# Patient Record
Sex: FEMALE | Race: WHITE | HISPANIC OR LATINO | Employment: UNEMPLOYED | URBAN - METROPOLITAN AREA
[De-identification: names, ages, dates, MRNs, and addresses within clinical notes are randomized per-mention and may not be internally consistent; named-entity substitution may affect disease eponyms.]

---

## 2019-06-11 ENCOUNTER — HOSPITAL ENCOUNTER (EMERGENCY)
Facility: HOSPITAL | Age: 24
Discharge: HOME | End: 2019-06-11
Attending: EMERGENCY MEDICINE

## 2019-06-11 VITALS
WEIGHT: 145 LBS | TEMPERATURE: 98 F | RESPIRATION RATE: 18 BRPM | SYSTOLIC BLOOD PRESSURE: 114 MMHG | OXYGEN SATURATION: 100 % | BODY MASS INDEX: 25.69 KG/M2 | HEIGHT: 63 IN | DIASTOLIC BLOOD PRESSURE: 98 MMHG | HEART RATE: 71 BPM

## 2019-06-11 DIAGNOSIS — N30.00 ACUTE CYSTITIS WITHOUT HEMATURIA: ICD-10-CM

## 2019-06-11 DIAGNOSIS — R10.2 SUPRAPUBIC ABDOMINAL PAIN: Primary | ICD-10-CM

## 2019-06-11 LAB
ALBUMIN SERPL BCP-MCNC: 4 G/DL (ref 3.5–5.2)
ALP SERPL-CCNC: 56 U/L (ref 55–135)
ALT SERPL W/O P-5'-P-CCNC: 15 U/L (ref 10–44)
ANION GAP SERPL CALC-SCNC: 10 MMOL/L (ref 8–16)
AST SERPL-CCNC: 15 U/L (ref 10–40)
B-HCG UR QL: NEGATIVE
BILIRUB SERPL-MCNC: 0.3 MG/DL (ref 0.1–1)
BILIRUB UR QL STRIP: NEGATIVE
BUN SERPL-MCNC: 8 MG/DL (ref 6–20)
CALCIUM SERPL-MCNC: 9.8 MG/DL (ref 8.7–10.5)
CHLORIDE SERPL-SCNC: 106 MMOL/L (ref 95–110)
CLARITY UR: CLEAR
CO2 SERPL-SCNC: 24 MMOL/L (ref 23–29)
COLOR UR: YELLOW
CREAT SERPL-MCNC: 0.7 MG/DL (ref 0.5–1.4)
CTP QC/QA: YES
ERYTHROCYTE [DISTWIDTH] IN BLOOD BY AUTOMATED COUNT: 13.5 % (ref 11.5–14.5)
EST. GFR  (AFRICAN AMERICAN): >60 ML/MIN/1.73 M^2
EST. GFR  (NON AFRICAN AMERICAN): >60 ML/MIN/1.73 M^2
GLUCOSE SERPL-MCNC: 86 MG/DL (ref 70–110)
GLUCOSE UR QL STRIP: NEGATIVE
HCG INTACT+B SERPL-ACNC: <1.2 MIU/ML
HCT VFR BLD AUTO: 36.1 % (ref 37–48.5)
HGB BLD-MCNC: 11.8 G/DL (ref 12–16)
HGB UR QL STRIP: ABNORMAL
KETONES UR QL STRIP: NEGATIVE
LEUKOCYTE ESTERASE UR QL STRIP: ABNORMAL
MCH RBC QN AUTO: 28.6 PG (ref 27–31)
MCHC RBC AUTO-ENTMCNC: 32.7 G/DL (ref 32–36)
MCV RBC AUTO: 88 FL (ref 82–98)
MICROSCOPIC COMMENT: ABNORMAL
NITRITE UR QL STRIP: NEGATIVE
PH UR STRIP: 6 [PH] (ref 5–8)
PLATELET # BLD AUTO: 253 K/UL (ref 150–350)
PMV BLD AUTO: 9.7 FL (ref 9.2–12.9)
POTASSIUM SERPL-SCNC: 3.6 MMOL/L (ref 3.5–5.1)
PROT SERPL-MCNC: 7.4 G/DL (ref 6–8.4)
PROT UR QL STRIP: NEGATIVE
RBC # BLD AUTO: 4.12 M/UL (ref 4–5.4)
RBC #/AREA URNS HPF: 9 /HPF (ref 0–4)
SODIUM SERPL-SCNC: 140 MMOL/L (ref 136–145)
SP GR UR STRIP: 1.02 (ref 1–1.03)
SQUAMOUS #/AREA URNS HPF: 6 /HPF
URN SPEC COLLECT METH UR: ABNORMAL
UROBILINOGEN UR STRIP-ACNC: NEGATIVE EU/DL
WBC # BLD AUTO: 5.04 K/UL (ref 3.9–12.7)
WBC #/AREA URNS HPF: 2 /HPF (ref 0–5)

## 2019-06-11 PROCEDURE — 81025 URINE PREGNANCY TEST: CPT | Performed by: EMERGENCY MEDICINE

## 2019-06-11 PROCEDURE — 85027 COMPLETE CBC AUTOMATED: CPT

## 2019-06-11 PROCEDURE — 81000 URINALYSIS NONAUTO W/SCOPE: CPT

## 2019-06-11 PROCEDURE — 99283 EMERGENCY DEPT VISIT LOW MDM: CPT | Mod: 25

## 2019-06-11 PROCEDURE — 80053 COMPREHEN METABOLIC PANEL: CPT

## 2019-06-11 PROCEDURE — 84702 CHORIONIC GONADOTROPIN TEST: CPT

## 2019-06-11 RX ORDER — NITROFURANTOIN 25; 75 MG/1; MG/1
100 CAPSULE ORAL 2 TIMES DAILY
Qty: 10 CAPSULE | Refills: 0 | Status: SHIPPED | OUTPATIENT
Start: 2019-06-11 | End: 2019-06-16

## 2019-06-11 NOTE — ED PROVIDER NOTES
"Encounter Date: 6/11/2019    SCRIBE #1 NOTE: I, Michelle Ahumada, am scribing for, and in the presence of,  Dr. Monsalve. I have scribed the entire note.       History     Chief Complaint   Patient presents with    Abdominal Pain     pt states home pregnancy test anc complains of lower abd pain, denies any vaginal bleeding, pt states " I am scared, i had a miscarriage in the past"      Hoa Catalan is a 24 y.o. female who  has no past medical history on file.    The patient presents to the ED due to intermittent suprapubic, lower abdominal pain that started last night. Patient describes her pain as a sharp pain without any alleviating or exacerbating factors. She also reports a subjective fever lat night, but did not take her temperature. Patient states she took a home pregnancy test that read positive a few weeks ago.   She denies any associated nausea, vomiting, vaginal discharge or bleeding, dysuria, flank pain, or any other symptoms at this time. Patient took Acetaminophen last night for her pain but had little relief; she has not taken anything today for her symptoms.     Patient reports she had a miscarriage 2 months ago after being pregnant for 2 months. Patient states she saw an OB-GYN 2 months ago in [place], but does not have an OB/GYN currently.  Her LMP was on May 25th. She adds she has had normal monthly menstrual cycles since her miscarriage. She says her cycles usually last about 3 days but will occasionally start late. Denies any other concerning medical history at this time.  She takes no regular medications.    The history is provided by the patient. The history is limited by a language barrier. A  was used.     Review of patient's allergies indicates:  No Known Allergies  No past medical history on file.  No past surgical history on file.  No family history on file.  Social History     Tobacco Use    Smoking status: Not on file   Substance Use Topics    Alcohol use: Not on " file    Drug use: Not on file     Review of Systems   Constitutional: Positive for fever (Subjective). Negative for chills.   HENT: Negative for sore throat.    Respiratory: Negative for cough and shortness of breath.    Cardiovascular: Negative for chest pain.   Gastrointestinal: Positive for abdominal pain. Negative for constipation, diarrhea, nausea and vomiting.   Genitourinary: Negative for dysuria, frequency and urgency.   Musculoskeletal: Negative for back pain.   Skin: Negative for rash and wound.   Neurological: Negative for syncope and weakness.   Hematological: Does not bruise/bleed easily.   Psychiatric/Behavioral: Negative for agitation, behavioral problems and confusion.   All other systems reviewed and are negative.      Physical Exam     Initial Vitals [06/11/19 0737]   BP Pulse Resp Temp SpO2   117/66 76 18 97.6 °F (36.4 °C) 99 %      MAP       --         Physical Exam    Nursing note and vitals reviewed.  Constitutional: She appears well-developed and well-nourished. She is not diaphoretic. No distress.   HENT:   Head: Normocephalic and atraumatic.   Mouth/Throat: Oropharynx is clear and moist.   Eyes: EOM are normal. Pupils are equal, round, and reactive to light.   Neck: No tracheal deviation present.   Cardiovascular: Normal rate, regular rhythm, normal heart sounds and intact distal pulses.   Pulmonary/Chest: Breath sounds normal. No stridor. No respiratory distress. She has no wheezes.   Abdominal: Soft. Bowel sounds are normal. She exhibits no distension and no mass. There is tenderness.   Mild suprapubic abdominal tenderness   Musculoskeletal: Normal range of motion. She exhibits no edema.   Neurological: She is alert and oriented to person, place, and time. She has normal strength. No cranial nerve deficit or sensory deficit.   Skin: Skin is warm and dry. Capillary refill takes less than 2 seconds. No pallor.   Psychiatric: She has a normal mood and affect. Her behavior is normal. Thought  content normal.         ED Course   Procedures  Labs Reviewed   URINALYSIS, REFLEX TO URINE CULTURE - Abnormal; Notable for the following components:       Result Value    Occult Blood UA 3+ (*)     Leukocytes, UA Trace (*)     All other components within normal limits    Narrative:     Preferred Collection Type->Urine, Clean Catch   CBC WITHOUT DIFFERENTIAL - Abnormal; Notable for the following components:    Hemoglobin 11.8 (*)     Hematocrit 36.1 (*)     All other components within normal limits   URINALYSIS MICROSCOPIC - Abnormal; Notable for the following components:    RBC, UA 9 (*)     All other components within normal limits    Narrative:     Preferred Collection Type->Urine, Clean Catch   HCG, QUANTITATIVE, PREGNANCY   COMPREHENSIVE METABOLIC PANEL   POCT URINE PREGNANCY             Medical Decision Making:   Differential Diagnosis:   Differential Diagnosis includes, but is not limited to:  Pregnancy complication (threatened AB, inevitable AB, incomplete Ab, missed AB, uterine rupture, ectopic pregnancy, placental abruption, placenta previa), ovarian cyst/torsion, STD, foreign body, trauma, normal menstruation.  Clinical Tests:   Lab Tests: Reviewed and Ordered  ED Management:  After complete evaluation, including thorough history and physical exam, the patient's symptoms are most consistent with benign cause of abdominal pain. There is no rebound/guarding or other peritoneal signs to suggest perforation or other emergent surgical process. There is no fever or leukocytosis to suggest acute bacterial infection. There is no significant focal abdominal tenderness to suggest cholecystitis, appendicitis, diverticulitis, or  source, and the patient's current symptoms and clinical presentation do not warrant other targeted diagnostics at this time. CT A/P is unlikely to outweigh risks of contrast/radiation at this time. The patient was treated with supportive care and improved. Will provide RX for Macrobid upon  D/C.    Upon re-evaluation, the patient's status has improved.  After complete ED evaluation, clinical impression is most consistent with UTI.  OB-GYN follow-up within 2-3 days was recommended.    After taking into careful account the patient's history, physical exam findings, as well as empirical and objective data obtained throughout ED workup, I feel no emergent medical condition has been identified. No further evaluation or admission was felt to be required, and the patient is stable for discharge from the ED. The patient and any additional family present were updated with test results, overall clinical impression, and recommended further plan of care, including discharge instructions as provided and outpatient follow-up for continued evaluation and management as needed. All questions were answered. The patient expressed understanding and agreed with current plan for discharge and follow-up plan of care. Strict ED return precautions were provided, including return/worsening of current symptoms, new symptoms, or any other concerns.                   ED Course as of Jun 11 0933   Tue Jun 11, 2019   0814 24-year-old female presents to the ED due to positive UPT at home associated with lower abdominal pain starting last night.  Last menstrual cycle was 2 weeks ago, and last 3 days, which was normal per the patient.  She endorses a miscarriage 2 months ago, and states she followed up with her OB in [place] afterward.  She denies associated nausea/vomiting, diarrhea, vaginal bleeding/discharge, dysuria, hematuria, or any other associated symptoms.  Vitals and exam benign.  Will obtain UPT and UA, beta HCG, labs, and reassess.    [SS]   0929 UPT negative.   UA with trace leukocytes.  Labs otherwise unremarkable.  Beta HCG undetectable.  Patient likely with false positive pregnancy test at home, possibly related to patient's recent miscarriage.  Patient not currently pregnant.  Mild, suprapubic abdominal pain likely  related to mild UTI.  Will prescribe Macrobid and refer patient to OBGYN for continued follow-up.   Patient given strict return precautions including worsening symptoms, nausea/vomiting, fever, or any other concerns.    [SS]      ED Course User Index  [SS] You Monsalve MD     Clinical Impression:     1. Suprapubic abdominal pain    2. Acute cystitis without hematuria        Disposition:   Disposition: Discharged  Condition: Stable    I, Dr. You Monsalve, personally performed the services described in this documentation. All medical record entries made by the scribe were at my direction and in my presence.  I have reviewed the chart and agree that the record reflects my personal performance and is accurate and complete.     You Monsalve MD.           oYu Monsalve MD  06/15/19 4217

## 2019-06-11 NOTE — ED NOTES
APPEARANCE: Alert, oriented and in no acute distress.  CARDIAC: Normal rate and rhythm, no murmur heard.   PERIPHERAL VASCULAR: peripheral pulses present. Normal cap refill. No edema. Warm to touch.    RESPIRATORY:Normal rate and effort, breath sounds clear bilaterally throughout chest. Respirations are equal and unlabored no obvious signs of distress.  GASTRO: soft, bowel sounds normal, no tenderness, no abdominal distention.  MUSC: Full ROM. No bony tenderness or soft tissue tenderness. No obvious deformity.  SKIN: Skin is warm and dry, normal skin turgor, mucous membranes moist.  NEURO: 5/5 strength major flexors/extensors bilaterally. Sensory intact to light touch bilaterally. Waldwick coma scale: eyes open spontaneously-4, oriented & converses-5, obeys commands-6. No neurological abnormalities.   MENTAL STATUS: awake, alert and aware of environment.  EYE: PERRL, both eyes: pupils brisk and reactive to light. Normal size.

## 2019-06-18 ENCOUNTER — TELEPHONE (OUTPATIENT)
Dept: OBSTETRICS AND GYNECOLOGY | Facility: CLINIC | Age: 24
End: 2019-06-18

## 2019-06-18 NOTE — TELEPHONE ENCOUNTER
I tried calling patient to inform her that Dr. Bryant won't be in the office 6/24/2019 due to CME. Patient needs to reschedule.

## 2019-10-14 ENCOUNTER — HOSPITAL ENCOUNTER (EMERGENCY)
Facility: HOSPITAL | Age: 24
Discharge: HOME | End: 2019-10-14
Attending: EMERGENCY MEDICINE
Payer: MEDICAID

## 2019-10-14 VITALS
WEIGHT: 145 LBS | BODY MASS INDEX: 25.69 KG/M2 | DIASTOLIC BLOOD PRESSURE: 82 MMHG | HEIGHT: 63 IN | SYSTOLIC BLOOD PRESSURE: 118 MMHG | TEMPERATURE: 99 F | OXYGEN SATURATION: 100 % | RESPIRATION RATE: 20 BRPM | HEART RATE: 87 BPM

## 2019-10-14 DIAGNOSIS — Z3A.01 LESS THAN 8 WEEKS GESTATION OF PREGNANCY: Primary | ICD-10-CM

## 2019-10-14 LAB
ABO + RH BLD: NORMAL
ALBUMIN SERPL BCP-MCNC: 4.2 G/DL (ref 3.5–5.2)
ALP SERPL-CCNC: 64 U/L (ref 55–135)
ALT SERPL W/O P-5'-P-CCNC: 14 U/L (ref 10–44)
ANION GAP SERPL CALC-SCNC: 8 MMOL/L (ref 8–16)
AST SERPL-CCNC: 14 U/L (ref 10–40)
B-HCG UR QL: POSITIVE
BASOPHILS # BLD AUTO: 0.02 K/UL (ref 0–0.2)
BASOPHILS NFR BLD: 0.3 % (ref 0–1.9)
BILIRUB SERPL-MCNC: 0.3 MG/DL (ref 0.1–1)
BILIRUB UR QL STRIP: NEGATIVE
BUN SERPL-MCNC: 13 MG/DL (ref 6–20)
CALCIUM SERPL-MCNC: 9.6 MG/DL (ref 8.7–10.5)
CHLORIDE SERPL-SCNC: 105 MMOL/L (ref 95–110)
CLARITY UR: CLEAR
CO2 SERPL-SCNC: 23 MMOL/L (ref 23–29)
COLOR UR: YELLOW
CREAT SERPL-MCNC: 0.7 MG/DL (ref 0.5–1.4)
CTP QC/QA: YES
DIFFERENTIAL METHOD: ABNORMAL
EOSINOPHIL # BLD AUTO: 0.2 K/UL (ref 0–0.5)
EOSINOPHIL NFR BLD: 2.5 % (ref 0–8)
ERYTHROCYTE [DISTWIDTH] IN BLOOD BY AUTOMATED COUNT: 14.6 % (ref 11.5–14.5)
EST. GFR  (AFRICAN AMERICAN): >60 ML/MIN/1.73 M^2
EST. GFR  (NON AFRICAN AMERICAN): >60 ML/MIN/1.73 M^2
GLUCOSE SERPL-MCNC: 97 MG/DL (ref 70–110)
GLUCOSE UR QL STRIP: NEGATIVE
HCG INTACT+B SERPL-ACNC: NORMAL MIU/ML
HCT VFR BLD AUTO: 36 % (ref 37–48.5)
HGB BLD-MCNC: 11.8 G/DL (ref 12–16)
HGB UR QL STRIP: NEGATIVE
KETONES UR QL STRIP: NEGATIVE
LEUKOCYTE ESTERASE UR QL STRIP: NEGATIVE
LYMPHOCYTES # BLD AUTO: 2.4 K/UL (ref 1–4.8)
LYMPHOCYTES NFR BLD: 33.9 % (ref 18–48)
MCH RBC QN AUTO: 29.3 PG (ref 27–31)
MCHC RBC AUTO-ENTMCNC: 32.8 G/DL (ref 32–36)
MCV RBC AUTO: 89 FL (ref 82–98)
MONOCYTES # BLD AUTO: 0.4 K/UL (ref 0.3–1)
MONOCYTES NFR BLD: 5.7 % (ref 4–15)
NEUTROPHILS # BLD AUTO: 4.1 K/UL (ref 1.8–7.7)
NEUTROPHILS NFR BLD: 57.6 % (ref 38–73)
NITRITE UR QL STRIP: NEGATIVE
PH UR STRIP: 8 [PH] (ref 5–8)
PLATELET # BLD AUTO: 286 K/UL (ref 150–350)
PMV BLD AUTO: 10.3 FL (ref 9.2–12.9)
POTASSIUM SERPL-SCNC: 3.9 MMOL/L (ref 3.5–5.1)
PROT SERPL-MCNC: 7.8 G/DL (ref 6–8.4)
PROT UR QL STRIP: NEGATIVE
RBC # BLD AUTO: 4.03 M/UL (ref 4–5.4)
SODIUM SERPL-SCNC: 136 MMOL/L (ref 136–145)
SP GR UR STRIP: 1.01 (ref 1–1.03)
URN SPEC COLLECT METH UR: NORMAL
UROBILINOGEN UR STRIP-ACNC: NEGATIVE EU/DL
WBC # BLD AUTO: 7.14 K/UL (ref 3.9–12.7)

## 2019-10-14 PROCEDURE — 96374 THER/PROPH/DIAG INJ IV PUSH: CPT

## 2019-10-14 PROCEDURE — 81025 URINE PREGNANCY TEST: CPT | Performed by: EMERGENCY MEDICINE

## 2019-10-14 PROCEDURE — 85025 COMPLETE CBC W/AUTO DIFF WBC: CPT

## 2019-10-14 PROCEDURE — 84702 CHORIONIC GONADOTROPIN TEST: CPT

## 2019-10-14 PROCEDURE — 99284 EMERGENCY DEPT VISIT MOD MDM: CPT | Mod: 25

## 2019-10-14 PROCEDURE — 80053 COMPREHEN METABOLIC PANEL: CPT

## 2019-10-14 PROCEDURE — 96361 HYDRATE IV INFUSION ADD-ON: CPT

## 2019-10-14 PROCEDURE — 25000003 PHARM REV CODE 250: Performed by: NURSE PRACTITIONER

## 2019-10-14 PROCEDURE — 81003 URINALYSIS AUTO W/O SCOPE: CPT

## 2019-10-14 PROCEDURE — 63600175 PHARM REV CODE 636 W HCPCS: Performed by: NURSE PRACTITIONER

## 2019-10-14 PROCEDURE — 86901 BLOOD TYPING SEROLOGIC RH(D): CPT

## 2019-10-14 RX ORDER — ONDANSETRON 2 MG/ML
4 INJECTION INTRAMUSCULAR; INTRAVENOUS
Status: COMPLETED | OUTPATIENT
Start: 2019-10-14 | End: 2019-10-14

## 2019-10-14 RX ORDER — ACETAMINOPHEN 325 MG/1
650 TABLET ORAL
Status: COMPLETED | OUTPATIENT
Start: 2019-10-14 | End: 2019-10-14

## 2019-10-14 RX ADMIN — SODIUM CHLORIDE 1000 ML: 0.9 INJECTION, SOLUTION INTRAVENOUS at 02:10

## 2019-10-14 RX ADMIN — ACETAMINOPHEN 650 MG: 325 TABLET ORAL at 02:10

## 2019-10-14 RX ADMIN — ONDANSETRON 4 MG: 2 INJECTION INTRAMUSCULAR; INTRAVENOUS at 02:10

## 2019-10-14 NOTE — ED NOTES
Deana  #[id number] utilized.  Pt reports abd pain to lower abdomen with stabbing pains that are intermittent.  Pt reports nausea but denies fever, dysuria, hematuria or vomiting.  LMP 9/10/19, denies irregular menstrual cycles.  Denies OB/GYN.   A1.  Also reports frontal headache that is constant x 4 days.  Has taken Aleve to treat 1 day ago with minor improvement.  Denies allergies, denies any health problems.  Denies taking any medications prescribed by a doctor daily.

## 2019-10-14 NOTE — ED PROVIDER NOTES
Encounter Date: 10/14/2019    SCRIBE #1 NOTE: I, Keena Berry, am scribing for, and in the presence of,  Sarah Menon NP. I have scribed the entire note.       History     Chief Complaint   Patient presents with    Headache     Pt reports headache x 1 day with nausea and vomiting that started yesterday.      Hoa Catalan is a 24 y.o. female who  has no past medical history on file.    The patient presents to the ED due to worsening lower abdominal pain and headache that started 4 days ago. Associated symptom includes 3 episodes of emesis today.  No hematemesis.  She notes the pain in her abdomen began before her other symptoms. Patient has tried aleve for her headache yesterday, but none today. She denies any recent injury, fever/chills, cough, visual changes, weakness, numbness/tingling, rash, diarrhea, dysuria vaginal bleeding, or vaginal discharge. Patient reports her LMP was 09/10/19. She denies taking a pregnancy test at home because she reports taking one 4 months ago where it came out negative, but when she reported to the hospital, the test came out positive. Patient is A1. She recently had an  4 months ago. Pt reports her cycle is normally regular.  The patient is able to tolerate oral fluids today.  No other complaints at this time.    The history is provided by the patient. The history is limited by a language barrier. A  was used (Language line  test used #[id number] .).     Review of patient's allergies indicates:  No Known Allergies  History reviewed. No pertinent past medical history.  History reviewed. No pertinent surgical history.  History reviewed. No pertinent family history.  Social History     Tobacco Use    Smoking status: Not on file   Substance Use Topics    Alcohol use: Not on file    Drug use: Not on file     Review of Systems   Constitutional: Negative for fever.   Gastrointestinal: Positive for abdominal pain and vomiting. Negative for  diarrhea.   Genitourinary: Negative for difficulty urinating, vaginal bleeding and vaginal discharge.   Neurological: Positive for headaches.   All other systems reviewed and are negative.      Physical Exam     Initial Vitals [10/14/19 1158]   BP Pulse Resp Temp SpO2   126/71 101 20 98 °F (36.7 °C) 98 %      MAP       --         Physical Exam    Nursing note and vitals reviewed.  Constitutional: Vital signs are normal. She appears well-developed and well-nourished. She is not diaphoretic.  Non-toxic appearance. She does not have a sickly appearance. She does not appear ill. No distress.   HENT:   Head: Normocephalic and atraumatic.   Right Ear: External ear normal.   Left Ear: External ear normal.   Mouth/Throat: Oropharynx is clear and moist.   Eyes: Conjunctivae and EOM are normal. Pupils are equal, round, and reactive to light.   Neck: Normal range of motion, full passive range of motion without pain and phonation normal. Neck supple. No neck rigidity.   No meningis signs.    Cardiovascular: Normal rate, regular rhythm and normal heart sounds. Exam reveals no gallop and no friction rub.    No murmur heard.  Pulmonary/Chest: Effort normal and breath sounds normal. She has no wheezes. She has no rhonchi. She has no rales.   Abdominal: Soft. Normal appearance and bowel sounds are normal. She exhibits no distension. There is tenderness in the suprapubic area. There is no rebound, no guarding and no CVA tenderness.   Supra pubic tenderness.    Musculoskeletal: Normal range of motion. She exhibits no edema or tenderness.   Lymphadenopathy:     She has no cervical adenopathy.   Neurological: She is alert and oriented to person, place, and time. She has normal strength.   Skin: Skin is warm and dry. Capillary refill takes less than 2 seconds. No rash noted.         ED Course   Procedures  Labs Reviewed   CBC W/ AUTO DIFFERENTIAL - Abnormal; Notable for the following components:       Result Value    Hemoglobin 11.8 (*)      Hematocrit 36.0 (*)     RDW 14.6 (*)     All other components within normal limits   POCT URINE PREGNANCY - Abnormal; Notable for the following components:    POC Preg Test, Ur Positive (*)     All other components within normal limits   URINALYSIS, REFLEX TO URINE CULTURE    Narrative:     Preferred Collection Type->Urine, Clean Catch   COMPREHENSIVE METABOLIC PANEL   HCG, QUANTITATIVE, PREGNANCY   GROUP & RH          Imaging Results          US OB Less Than 14 Wks with Transvaginal (xpd) (Final result)  Result time 10/14/19 15:52:14    Final result by Kaushal Carrion MD (10/14/19 15:52:14)                 Impression:      Probable intrauterine gestational sac with a sonographic age of 5 weeks, 4 days.  No fetal pole or yolk sac yet identified.  Recommend continued follow-up, as clinically indicated.      Electronically signed by: Kaushal Carrion MD  Date:    10/14/2019  Time:    15:52             Narrative:    EXAMINATION:  US OB LESS THAN 14 WKS WITH TRANSVAGINAL (XPD)    CLINICAL HISTORY:  pelvic pain;    TECHNIQUE:  Transabdominal sonography of the pelvis was performed, followed by transvaginal sonography to better evaluate the uterus and ovaries.    COMPARISON:  None.    FINDINGS:  In the upper uterine segment, there is an oval fluid collection with a mean diameter of 0.9 cm.  No fetal pole or yolk sac yet identified.    The right ovary measures 3.6 x 2.0 x 2.8 cm, and demonstrates a corpus luteal cyst measuring 2.0 x 1.6 x 2.1 cm.    The left ovary is not visualized.    Trace free pelvic fluid.                                 Medical Decision Making:   Initial Assessment:   24-year-old female here for suprapubic pain, missed menstrual cycle, headache, and vomiting for the past few days.  The patient has not yet taken home pregnancy test.  The patient appears well, nontoxic.  Vitals stable. No meningeal signs. She has mild suprapubic tenderness.  Differential Diagnosis:   Pregnancy, viral syndrome,  dehydration, infection, electrolyte derangement  Clinical Tests:   Lab Tests: Ordered and Reviewed  Radiological Study: Ordered and Reviewed  ED Management:  Labs, US, IV fluids  US demonstrates probable IUP with estimated gestational age 5 weeks 4 days.  Pt is B positive and does not require rhogam. UA negative for infection.  Pt reports significant improvement of symptoms after ED interventions. She denies vaginal bleeding.   Pt advised to f/u with OB within 3 days.  I did advised the patient of need for repeat labs and ultrasound to establish IUP definitively.  Ambulatory referral was given to OBGYN.  I feel the patient's headache is benign.  Patient has no neck stiffness/meningismus, fever, elevated WBC count, elevated blood pressure, confusion, vision loss, temporal artery tenderness, rash, vomiting, photophobia or thunderclap onset to suggest pseudotumor cerebri, meningitis, tumor, ICH, temporal arteritis, or encephalitis. She reports complete resolution of headache after ED interventions.  She is tolerating oral fluids without difficulty.  Pt to follow up with OB/GYN within 2 days.  I reviewed strict return precautions. In addition, pt is to return to the ED if condition changes, progresses, or if there are any concerns.  Pt verbalized understanding, compliance, and agreement with the treatment plan.                          Clinical Impression:       ICD-10-CM ICD-9-CM   1. Less than 8 weeks gestation of pregnancy Z3A.01 V22.2            I, Sarah RUIZ, personally performed the services described in this documentation. All medical record entries made by the scribe were at my direction and in my presence.  I have reviewed the chart and agree that the record reflects my personal performance and is accurate and complete.     MINDY Martinez  4:20 PM 10/14/2019       SARA Suazo  10/14/19 [id number]

## 2019-10-16 ENCOUNTER — LAB VISIT (OUTPATIENT)
Dept: LAB | Facility: HOSPITAL | Age: 24
End: 2019-10-16
Attending: OBSTETRICS & GYNECOLOGY
Payer: MEDICAID

## 2019-10-16 ENCOUNTER — OFFICE VISIT (OUTPATIENT)
Dept: OBSTETRICS AND GYNECOLOGY | Facility: CLINIC | Age: 24
End: 2019-10-16
Payer: MEDICAID

## 2019-10-16 VITALS
RESPIRATION RATE: 16 BRPM | DIASTOLIC BLOOD PRESSURE: 82 MMHG | BODY MASS INDEX: 25.27 KG/M2 | WEIGHT: 142.63 LBS | HEIGHT: 63 IN | SYSTOLIC BLOOD PRESSURE: 90 MMHG

## 2019-10-16 DIAGNOSIS — N91.2 AMENORRHEA: ICD-10-CM

## 2019-10-16 DIAGNOSIS — N91.2 AMENORRHEA: Primary | ICD-10-CM

## 2019-10-16 LAB
ABO + RH BLD: NORMAL
ALBUMIN SERPL BCP-MCNC: 4.2 G/DL (ref 3.5–5.2)
ALP SERPL-CCNC: 58 U/L (ref 55–135)
ALT SERPL W/O P-5'-P-CCNC: 14 U/L (ref 10–44)
ANION GAP SERPL CALC-SCNC: 7 MMOL/L (ref 8–16)
AST SERPL-CCNC: 16 U/L (ref 10–40)
B-HCG UR QL: POSITIVE
BASOPHILS # BLD AUTO: 0.02 K/UL (ref 0–0.2)
BASOPHILS NFR BLD: 0.2 % (ref 0–1.9)
BILIRUB SERPL-MCNC: 0.4 MG/DL (ref 0.1–1)
BLD GP AB SCN CELLS X3 SERPL QL: NORMAL
BUN SERPL-MCNC: 9 MG/DL (ref 6–20)
CALCIUM SERPL-MCNC: 9.6 MG/DL (ref 8.7–10.5)
CHLORIDE SERPL-SCNC: 105 MMOL/L (ref 95–110)
CO2 SERPL-SCNC: 26 MMOL/L (ref 23–29)
CREAT SERPL-MCNC: 0.8 MG/DL (ref 0.5–1.4)
CTP QC/QA: YES
DIFFERENTIAL METHOD: ABNORMAL
EOSINOPHIL # BLD AUTO: 0.2 K/UL (ref 0–0.5)
EOSINOPHIL NFR BLD: 2.5 % (ref 0–8)
ERYTHROCYTE [DISTWIDTH] IN BLOOD BY AUTOMATED COUNT: 14.3 % (ref 11.5–14.5)
EST. GFR  (AFRICAN AMERICAN): >60 ML/MIN/1.73 M^2
EST. GFR  (NON AFRICAN AMERICAN): >60 ML/MIN/1.73 M^2
ESTIMATED AVG GLUCOSE: 105 MG/DL (ref 68–131)
GLUCOSE SERPL-MCNC: 85 MG/DL (ref 70–110)
HBA1C MFR BLD HPLC: 5.3 % (ref 4–5.6)
HCT VFR BLD AUTO: 36.9 % (ref 37–48.5)
HGB BLD-MCNC: 12 G/DL (ref 12–16)
LYMPHOCYTES # BLD AUTO: 2.6 K/UL (ref 1–4.8)
LYMPHOCYTES NFR BLD: 31.7 % (ref 18–48)
MCH RBC QN AUTO: 29 PG (ref 27–31)
MCHC RBC AUTO-ENTMCNC: 32.5 G/DL (ref 32–36)
MCV RBC AUTO: 89 FL (ref 82–98)
MONOCYTES # BLD AUTO: 0.5 K/UL (ref 0.3–1)
MONOCYTES NFR BLD: 5.6 % (ref 4–15)
NEUTROPHILS # BLD AUTO: 4.9 K/UL (ref 1.8–7.7)
NEUTROPHILS NFR BLD: 60 % (ref 38–73)
PLATELET # BLD AUTO: 306 K/UL (ref 150–350)
PMV BLD AUTO: 9.9 FL (ref 9.2–12.9)
POTASSIUM SERPL-SCNC: 4.2 MMOL/L (ref 3.5–5.1)
PROT SERPL-MCNC: 7.6 G/DL (ref 6–8.4)
RBC # BLD AUTO: 4.14 M/UL (ref 4–5.4)
SODIUM SERPL-SCNC: 138 MMOL/L (ref 136–145)
WBC # BLD AUTO: 8.1 K/UL (ref 3.9–12.7)

## 2019-10-16 PROCEDURE — 83036 HEMOGLOBIN GLYCOSYLATED A1C: CPT

## 2019-10-16 PROCEDURE — 87661 TRICHOMONAS VAGINALIS AMPLIF: CPT

## 2019-10-16 PROCEDURE — 87340 HEPATITIS B SURFACE AG IA: CPT

## 2019-10-16 PROCEDURE — 87086 URINE CULTURE/COLONY COUNT: CPT

## 2019-10-16 PROCEDURE — 99999 PR PBB SHADOW E&M-EST. PATIENT-LVL III: ICD-10-PCS | Mod: PBBFAC,,, | Performed by: OBSTETRICS & GYNECOLOGY

## 2019-10-16 PROCEDURE — 88141 CYTOPATH C/V INTERPRET: CPT | Mod: ,,, | Performed by: PATHOLOGY

## 2019-10-16 PROCEDURE — 87801 DETECT AGNT MULT DNA AMPLI: CPT

## 2019-10-16 PROCEDURE — 80053 COMPREHEN METABOLIC PANEL: CPT

## 2019-10-16 PROCEDURE — 87491 CHLMYD TRACH DNA AMP PROBE: CPT

## 2019-10-16 PROCEDURE — 88141 LIQUID-BASED PAP SMEAR, SCREENING: ICD-10-PCS | Mod: ,,, | Performed by: PATHOLOGY

## 2019-10-16 PROCEDURE — 86592 SYPHILIS TEST NON-TREP QUAL: CPT

## 2019-10-16 PROCEDURE — 99203 PR OFFICE/OUTPT VISIT, NEW, LEVL III, 30-44 MIN: ICD-10-PCS | Mod: S$PBB,,, | Performed by: OBSTETRICS & GYNECOLOGY

## 2019-10-16 PROCEDURE — 99213 OFFICE O/P EST LOW 20 MIN: CPT | Mod: PBBFAC,PO | Performed by: OBSTETRICS & GYNECOLOGY

## 2019-10-16 PROCEDURE — 86762 RUBELLA ANTIBODY: CPT

## 2019-10-16 PROCEDURE — 81025 URINE PREGNANCY TEST: CPT | Mod: PBBFAC,PO | Performed by: OBSTETRICS & GYNECOLOGY

## 2019-10-16 PROCEDURE — 87481 CANDIDA DNA AMP PROBE: CPT | Mod: 59

## 2019-10-16 PROCEDURE — 86703 HIV-1/HIV-2 1 RESULT ANTBDY: CPT

## 2019-10-16 PROCEDURE — 85025 COMPLETE CBC W/AUTO DIFF WBC: CPT

## 2019-10-16 PROCEDURE — 36415 COLL VENOUS BLD VENIPUNCTURE: CPT

## 2019-10-16 PROCEDURE — 99999 PR PBB SHADOW E&M-EST. PATIENT-LVL III: CPT | Mod: PBBFAC,,, | Performed by: OBSTETRICS & GYNECOLOGY

## 2019-10-16 PROCEDURE — 83021 HEMOGLOBIN CHROMOTOGRAPHY: CPT

## 2019-10-16 PROCEDURE — 99203 OFFICE O/P NEW LOW 30 MIN: CPT | Mod: S$PBB,,, | Performed by: OBSTETRICS & GYNECOLOGY

## 2019-10-16 PROCEDURE — 88175 CYTOPATH C/V AUTO FLUID REDO: CPT | Performed by: PATHOLOGY

## 2019-10-16 PROCEDURE — 86850 RBC ANTIBODY SCREEN: CPT

## 2019-10-16 NOTE — PROGRESS NOTES
OBSTETRICS /GYNECOLOGY     CC: Absence of menses / positive UPT at home     Hoa Nava is a 24 y.o. female  presents with complaint of absence of menstruation.    She denies nausea/vomIting/abdominal pain/bleeding.  UPT is positive.     Went to ED for abdominal pain and was told was pregnant  US was negative       History reviewed. No pertinent past medical history.  Past Surgical History:   Procedure Laterality Date    DILATION AND CURETTAGE OF UTERUS  2019    [place]     Social History     Socioeconomic History    Marital status: Single     Spouse name: Not on file    Number of children: Not on file    Years of education: Not on file    Highest education level: Not on file   Occupational History    Not on file   Social Needs    Financial resource strain: Not on file    Food insecurity:     Worry: Not on file     Inability: Not on file    Transportation needs:     Medical: Not on file     Non-medical: Not on file   Tobacco Use    Smoking status: Never Smoker    Smokeless tobacco: Never Used   Substance and Sexual Activity    Alcohol use: Not Currently    Drug use: Never    Sexual activity: Yes     Partners: Male     Birth control/protection: None   Lifestyle    Physical activity:     Days per week: Not on file     Minutes per session: Not on file    Stress: Not on file   Relationships    Social connections:     Talks on phone: Not on file     Gets together: Not on file     Attends Judaism service: Not on file     Active member of club or organization: Not on file     Attends meetings of clubs or organizations: Not on file     Relationship status: Not on file   Other Topics Concern    Not on file   Social History Narrative    Not on file     Family History   Problem Relation Age of Onset    Hypertension Father      OB History    Para Term  AB Living   2       1     SAB TAB Ectopic Multiple Live Births   1              # Outcome Date GA Lbr Alexander/2nd Weight  "Sex Delivery Anes PTL Lv   2 Current            1 SAB 04/2019 3w0d              BP 90/82   Resp 16   Ht 5' 3" (1.6 m)   Wt 64.7 kg (142 lb 10.2 oz)   LMP 09/10/2019   BMI 25.27 kg/m²     ROS:  GENERAL: Denies weight gain or weight loss. Feeling well overall.   SKIN: Denies rash or lesions.   HEAD: Denies head injury or headache.   NODES: Denies enlarged lymph nodes.   CHEST: Denies chest pain or shortness of breath.   CARDIOVASCULAR: Denies palpitations or left sided chest pain.   ABDOMEN: No abdominal pain, constipation, diarrhea, nausea, vomiting or rectal bleeding.   URINARY: No frequency, dysuria, hematuria, or burning on urination.  REPRODUCTIVE: See HPI.   BREASTS: The patient performs breast self-examination and denies pain, lumps, or nipple discharge.   HEMATOLOGIC: No easy bruisability or excessive bleeding.   MUSCULOSKELETAL: Denies joint pain or swelling.   NEUROLOGIC: Denies syncope or weakness.   PSYCHIATRIC: Denies depression, anxiety or mood swings.    PE:   APPEARANCE: Well nourished, well developed, in no acute distress.  AFFECT: WNL, alert and oriented x 3.  SKIN: No acne or hirsutism.  NECK: Neck symmetric without masses or thyromegaly.  NODES: No inguinal, cervical, axillary or femoral lymph node enlargement.  CHEST: Good respiratory effort.   ABDOMEN: Soft. No tenderness or masses. No hepatosplenomegaly. No hernias.  BREASTS: Symmetrical, no skin changes or visible lesions. No palpable masses, nipple discharge bilaterally.  PELVIC: Normal external female genitalia without lesions. Normal hair distribution. Adequate perineal body, normal urethral meatus. Vagina moist and well rugated without lesions or discharge. Cervix pink, without lesions, discharge or tenderness. No significant cystocele or rectocele. Bimanual exam shows uterus is 8 weeks, regular, mobile and nontender. Adnexa without masses or tenderness.  EXTREMITIES: No edema.          ASSESSMENT and PLAN:    1-Pregnancy Examination " test , positive      Prenatal Labs  Dating US  GC/CT  Affirm   PAP    Patient was counseled today on proper weight gain based on the [place]'s recommendations based on her pre-pregnancy weight. Discussed foods to avoid in pregnancy (i.e. sushi, fish that are high in mercury, deli meat, and unpasteurized cheeses). Discussed prenatal vitamin options (i.e. stool softener, DHA). Contingency screen offered - patient desires.  Discussed: Common complaints of pregnancy, HIV and other routine prenatal tests, Tobacco abuse, risk factors identified by prenatal history, Anticipated course of prenatal careNutrition and weight gain counseling,Toxoplasmosis precautions (Cats/Raw Meat) Exercise, Alcohol, Illicit/Recreational Drugs, Seat belt use, Childbirth classes/Hospital facilities.The counseling session lasted approximately 25 minutes, and all her questions were answered.     Will review US and prenatal labs       Follow up in  4 weeks       Roland Bryant M.D.   OB/GYN

## 2019-10-17 LAB
C TRACH DNA SPEC QL NAA+PROBE: NOT DETECTED
HBV SURFACE AG SERPL QL IA: NEGATIVE
HGB A2 MFR BLD HPLC: 2.5 % (ref 2.2–3.2)
HGB FRACT BLD ELPH-IMP: NORMAL
HGB FRACT BLD ELPH-IMP: NORMAL
HIV 1+2 AB+HIV1 P24 AG SERPL QL IA: NEGATIVE
N GONORRHOEA DNA SPEC QL NAA+PROBE: NOT DETECTED
RPR SER QL: NORMAL
RUBV IGG SER-ACNC: 32 IU/ML
RUBV IGG SER-ACNC: 32 IU/ML
RUBV IGG SER-IMP: REACTIVE
RUBV IGG SER-IMP: REACTIVE

## 2019-10-18 LAB
BACTERIA UR CULT: NORMAL
BACTERIA UR CULT: NORMAL
BACTERIAL VAGINOSIS DNA: POSITIVE
CANDIDA GLABRATA DNA: NEGATIVE
CANDIDA KRUSEI DNA: NEGATIVE
CANDIDA RRNA VAG QL PROBE: NEGATIVE
T VAGINALIS RRNA GENITAL QL PROBE: NEGATIVE

## 2019-10-21 ENCOUNTER — TELEPHONE (OUTPATIENT)
Dept: OBSTETRICS AND GYNECOLOGY | Facility: CLINIC | Age: 24
End: 2019-10-21

## 2019-10-21 RX ORDER — METRONIDAZOLE 500 MG/1
500 TABLET ORAL EVERY 12 HOURS
Qty: 14 TABLET | Refills: 0 | Status: SHIPPED | OUTPATIENT
Start: 2019-10-21 | End: 2019-10-28

## 2019-10-21 NOTE — TELEPHONE ENCOUNTER
Patient informed that affirm culture came back positive for BV and that antibiotics were sent to her pharmacy to take as directed. Patient verbalized understanding.

## 2019-10-21 NOTE — TELEPHONE ENCOUNTER
GC/CT negative     Affirm resulted  Positive for BV  Rx for flagyl sent to pharmacy on file  Please inform patient    Roland Bryant M.D.   OB/GYN

## 2019-10-24 ENCOUNTER — HOSPITAL ENCOUNTER (OUTPATIENT)
Dept: RADIOLOGY | Facility: HOSPITAL | Age: 24
Discharge: HOME | End: 2019-10-24
Attending: OBSTETRICS & GYNECOLOGY
Payer: MEDICAID

## 2019-10-24 DIAGNOSIS — N91.2 AMENORRHEA: ICD-10-CM

## 2019-10-24 PROCEDURE — 76801 OB US < 14 WKS SINGLE FETUS: CPT | Mod: TC

## 2019-10-24 PROCEDURE — 76817 US OB LESS THAN 14 WKS WITH TRANSVAGINAL (XPD): ICD-10-PCS | Mod: 26,,, | Performed by: RADIOLOGY

## 2019-10-24 PROCEDURE — 76817 TRANSVAGINAL US OBSTETRIC: CPT | Mod: 26,,, | Performed by: RADIOLOGY

## 2019-10-24 PROCEDURE — 76801 OB US < 14 WKS SINGLE FETUS: CPT | Mod: 26,,, | Performed by: RADIOLOGY

## 2019-10-24 PROCEDURE — 76801 US OB LESS THAN 14 WKS WITH TRANSVAGINAL (XPD): ICD-10-PCS | Mod: 26,,, | Performed by: RADIOLOGY

## 2019-10-31 ENCOUNTER — TELEPHONE (OUTPATIENT)
Dept: OBSTETRICS AND GYNECOLOGY | Facility: CLINIC | Age: 24
End: 2019-10-31

## 2019-10-31 NOTE — TELEPHONE ENCOUNTER
Patient notified that ultrasound appointment for tomorrow 11/1/2019 will be canceled due to her already having a dating ultrasound on 10/24/2019. Patient advised to return on her scheduled ob appointment on the 13th. Patient verbalized understanding.

## 2019-11-02 ENCOUNTER — HOSPITAL ENCOUNTER (EMERGENCY)
Facility: HOSPITAL | Age: 24
Discharge: HOME | End: 2019-11-02
Attending: EMERGENCY MEDICINE
Payer: MEDICAID

## 2019-11-02 VITALS
HEART RATE: 71 BPM | SYSTOLIC BLOOD PRESSURE: 112 MMHG | DIASTOLIC BLOOD PRESSURE: 62 MMHG | RESPIRATION RATE: 18 BRPM | TEMPERATURE: 97 F | OXYGEN SATURATION: 99 % | BODY MASS INDEX: 24.35 KG/M2 | WEIGHT: 137.44 LBS

## 2019-11-02 DIAGNOSIS — R10.9 ABDOMINAL PAIN: ICD-10-CM

## 2019-11-02 DIAGNOSIS — O21.9 NAUSEA AND VOMITING IN PREGNANCY PRIOR TO 22 WEEKS GESTATION: Primary | ICD-10-CM

## 2019-11-02 LAB
ALBUMIN SERPL BCP-MCNC: 3.9 G/DL (ref 3.5–5.2)
ALP SERPL-CCNC: 60 U/L (ref 55–135)
ALT SERPL W/O P-5'-P-CCNC: 12 U/L (ref 10–44)
ANION GAP SERPL CALC-SCNC: 10 MMOL/L (ref 8–16)
AST SERPL-CCNC: 14 U/L (ref 10–40)
BASOPHILS # BLD AUTO: 0.02 K/UL (ref 0–0.2)
BASOPHILS NFR BLD: 0.2 % (ref 0–1.9)
BILIRUB SERPL-MCNC: 0.2 MG/DL (ref 0.1–1)
BILIRUB UR QL STRIP: NEGATIVE
BUN SERPL-MCNC: 7 MG/DL (ref 6–20)
CALCIUM SERPL-MCNC: 9.8 MG/DL (ref 8.7–10.5)
CHLORIDE SERPL-SCNC: 103 MMOL/L (ref 95–110)
CLARITY UR: CLEAR
CO2 SERPL-SCNC: 23 MMOL/L (ref 23–29)
COLOR UR: YELLOW
CREAT SERPL-MCNC: 0.7 MG/DL (ref 0.5–1.4)
DIFFERENTIAL METHOD: ABNORMAL
EOSINOPHIL # BLD AUTO: 0.2 K/UL (ref 0–0.5)
EOSINOPHIL NFR BLD: 1.8 % (ref 0–8)
ERYTHROCYTE [DISTWIDTH] IN BLOOD BY AUTOMATED COUNT: 13.5 % (ref 11.5–14.5)
EST. GFR  (AFRICAN AMERICAN): >60 ML/MIN/1.73 M^2
EST. GFR  (NON AFRICAN AMERICAN): >60 ML/MIN/1.73 M^2
GLUCOSE SERPL-MCNC: 93 MG/DL (ref 70–110)
GLUCOSE UR QL STRIP: NEGATIVE
HCG INTACT+B SERPL-ACNC: NORMAL MIU/ML
HCT VFR BLD AUTO: 35.7 % (ref 37–48.5)
HGB BLD-MCNC: 12 G/DL (ref 12–16)
HGB UR QL STRIP: ABNORMAL
KETONES UR QL STRIP: NEGATIVE
LEUKOCYTE ESTERASE UR QL STRIP: NEGATIVE
LYMPHOCYTES # BLD AUTO: 3.1 K/UL (ref 1–4.8)
LYMPHOCYTES NFR BLD: 35 % (ref 18–48)
MCH RBC QN AUTO: 29.6 PG (ref 27–31)
MCHC RBC AUTO-ENTMCNC: 33.6 G/DL (ref 32–36)
MCV RBC AUTO: 88 FL (ref 82–98)
MONOCYTES # BLD AUTO: 0.7 K/UL (ref 0.3–1)
MONOCYTES NFR BLD: 7.5 % (ref 4–15)
NEUTROPHILS # BLD AUTO: 5 K/UL (ref 1.8–7.7)
NEUTROPHILS NFR BLD: 55.5 % (ref 38–73)
NITRITE UR QL STRIP: NEGATIVE
PH UR STRIP: 7 [PH] (ref 5–8)
PLATELET # BLD AUTO: 276 K/UL (ref 150–350)
PMV BLD AUTO: 10.1 FL (ref 9.2–12.9)
POTASSIUM SERPL-SCNC: 4.2 MMOL/L (ref 3.5–5.1)
PROT SERPL-MCNC: 7.5 G/DL (ref 6–8.4)
PROT UR QL STRIP: NEGATIVE
RBC # BLD AUTO: 4.06 M/UL (ref 4–5.4)
SODIUM SERPL-SCNC: 136 MMOL/L (ref 136–145)
SP GR UR STRIP: 1.01 (ref 1–1.03)
URN SPEC COLLECT METH UR: ABNORMAL
UROBILINOGEN UR STRIP-ACNC: NEGATIVE EU/DL
WBC # BLD AUTO: 8.97 K/UL (ref 3.9–12.7)

## 2019-11-02 PROCEDURE — 84702 CHORIONIC GONADOTROPIN TEST: CPT

## 2019-11-02 PROCEDURE — 96361 HYDRATE IV INFUSION ADD-ON: CPT

## 2019-11-02 PROCEDURE — 99284 EMERGENCY DEPT VISIT MOD MDM: CPT | Mod: 25

## 2019-11-02 PROCEDURE — 85025 COMPLETE CBC W/AUTO DIFF WBC: CPT

## 2019-11-02 PROCEDURE — 96365 THER/PROPH/DIAG IV INF INIT: CPT

## 2019-11-02 PROCEDURE — 81003 URINALYSIS AUTO W/O SCOPE: CPT

## 2019-11-02 PROCEDURE — 63600175 PHARM REV CODE 636 W HCPCS: Performed by: EMERGENCY MEDICINE

## 2019-11-02 PROCEDURE — 25000003 PHARM REV CODE 250: Performed by: EMERGENCY MEDICINE

## 2019-11-02 PROCEDURE — 80053 COMPREHEN METABOLIC PANEL: CPT

## 2019-11-02 RX ORDER — PROCHLORPERAZINE EDISYLATE 5 MG/ML
10 INJECTION INTRAMUSCULAR; INTRAVENOUS ONCE
Status: DISCONTINUED | OUTPATIENT
Start: 2019-11-02 | End: 2019-11-02

## 2019-11-02 RX ORDER — ACETAMINOPHEN 500 MG
1000 TABLET ORAL
Status: COMPLETED | OUTPATIENT
Start: 2019-11-02 | End: 2019-11-02

## 2019-11-02 RX ORDER — DOXYLAMINE SUCCINATE AND PYRIDOXINE HYDROCHLORIDE, DELAYED RELEASE TABLETS 10 MG/10 MG 10; 10 MG/1; MG/1
2 TABLET, DELAYED RELEASE ORAL NIGHTLY
Qty: 14 TABLET | Refills: 0 | Status: SHIPPED | OUTPATIENT
Start: 2019-11-02 | End: 2019-11-09

## 2019-11-02 RX ADMIN — ACETAMINOPHEN 1000 MG: 500 TABLET ORAL at 09:11

## 2019-11-02 RX ADMIN — SODIUM CHLORIDE 1000 ML: 0.9 INJECTION, SOLUTION INTRAVENOUS at 09:11

## 2019-11-02 RX ADMIN — PROMETHAZINE HYDROCHLORIDE 25 MG: 25 INJECTION INTRAMUSCULAR; INTRAVENOUS at 09:11

## 2019-11-03 NOTE — ED TRIAGE NOTES
Pt presented to ED G2PO AT 7WKS. Preg.  Pt states for last 2 days she has been experiencing fever, chills, n/v,ha,  lower abdominal and back pain. Pt also states she had some burning when urinating but no urgency.Pt states yesterday she took tylenol with little relief, pt denies any bleeding , discharge or leakage of fluid.merle [id number] used.

## 2019-11-03 NOTE — ED PROVIDER NOTES
Encounter Date: 2019    SCRIBE #1 NOTE: I, Drea Anjelica, am scribing for, and in the presence of,  Fatuma Felix MD. I have scribed the entire note.       History     Chief Complaint   Patient presents with    Abdominal Pain     reports headache, left flank, and abd pain X2 days. reports is currently pregnant, approximately 7 weeks OB is Dr. Bryant. reports vomiting, denies diarrhea.      Time seen by provider: 8:51 PM    This is a A1 7 weeks pregnant 24 y.o. female with no significant PMHx who presents with complaint of L flank pain. Her associated symptoms include dysuria, fever, chills, some lower abdominal pain, and vomiting. The patient denies diarrhea, vaginal bleeding/discharge, or any other concerning symptomsHome therapy attempts include Tylenol yesterday with minimal relief. She denies having any significant medical problems or any drug allergies. The patient still has her appendix. Her OB/GYN is Dr. Bryant, she had her 1st US on 10/24, and her second US is scheduled for . She reports that she has not had any complications in this pregnancy. The patient has no other medical complaints or concerns at this time.           Review of patient's allergies indicates:  No Known Allergies  No past medical history on file.  Past Surgical History:   Procedure Laterality Date    DILATION AND CURETTAGE OF UTERUS  2019    [place]     Family History   Problem Relation Age of Onset    Hypertension Father      Social History     Tobacco Use    Smoking status: Never Smoker    Smokeless tobacco: Never Used   Substance Use Topics    Alcohol use: Not Currently    Drug use: Never     Review of Systems   Constitutional: Positive for chills and fever.   Gastrointestinal: Positive for abdominal pain and vomiting. Negative for diarrhea.   Genitourinary: Positive for dysuria and flank pain (L). Negative for vaginal bleeding and vaginal discharge.   All other systems reviewed and are  negative.      Physical Exam     Initial Vitals [19]   BP Pulse Resp Temp SpO2   (!) 111/58 81 15 98.1 °F (36.7 °C) (!) 94 %      MAP       --         Physical Exam    Nursing note and vitals reviewed.  Constitutional: She appears well-developed and well-nourished. She is not diaphoretic. No distress.   HENT:   Head: Normocephalic and atraumatic.   Mouth/Throat: Oropharynx is clear and moist.   Eyes: Conjunctivae and EOM are normal. Pupils are equal, round, and reactive to light.   Neck: Normal range of motion. Neck supple.   Cardiovascular: Normal rate, regular rhythm and normal heart sounds. Exam reveals no gallop and no friction rub.    No murmur heard.  Pulmonary/Chest: Breath sounds normal. She has no wheezes. She has no rhonchi. She has no rales.   Abdominal: Soft. Bowel sounds are normal. There is tenderness in the right lower quadrant. There is no rebound and no guarding.   Musculoskeletal: Normal range of motion. She exhibits tenderness (lumbar paraspinal tenderness). She exhibits no edema.   Lymphadenopathy:     She has no cervical adenopathy.   Neurological: She is alert and oriented to person, place, and time. She has normal strength.   Skin: Skin is warm and dry. Capillary refill takes less than 2 seconds. No rash noted.         ED Course   Procedures  Labs Reviewed   CBC W/ AUTO DIFFERENTIAL - Abnormal; Notable for the following components:       Result Value    Hematocrit 35.7 (*)     All other components within normal limits   URINALYSIS, REFLEX TO URINE CULTURE - Abnormal; Notable for the following components:    Occult Blood UA Trace (*)     All other components within normal limits    Narrative:     Preferred Collection Type->Urine, Clean Catch   COMPREHENSIVE METABOLIC PANEL   HCG, QUANTITATIVE, PREGNANCY               Medical Decision Making:   History:   Old Medical Records: I decided to obtain old medical records.  Initial Assessment:   This is 24-year-old female , approximately  7 weeks pregnant, presents the ER for evaluation of 2 days of nausea, vomiting, dysuria, flank pain. Reports decreased oral intake, denies having symptoms like this before.  No other complaints. Does complain of vague right lower quadrant abdominal pain as well. Overall well-appearing, abdomen soft nontender, no acute distress.  Differential includes UTI, pyelonephritis, kidney stone, ectopic pregnancy, appendicitis, cystitis versus other cause.  Will obtain blood work,give IV fluids, symptomatic control reassess.  Ultrasound done at 6 weeks gestation revealed intrauterine pregnancy.  No need for repeat.  Clinical Tests:   Lab Tests: Reviewed and Ordered              Attending Attestation:           Physician Attestation for Scribe:  Physician Attestation Statement for Scribe #1: I, Fatuma Felix MD, reviewed documentation, as scribed by Drea Dejesus in my presence, and it is both accurate and complete.                 ED Course as of Nov 02 2231   Sat Nov 02, 2019   2220 Patient resting comfortablyIn bed, no acute distress, imaging reviewed.  No gross electrolyte abnormality, urine negative for nitrates, leukocyte esterase.  Hemodynamically stable. Patient reports he feels better, wishes to go home.  Patient does not have anyFlank pain, she does have more lumbar tenderness as a post a CVA tenderness.  Abdomen is currently soft, nontender, no rebound or guarding noted. She recently had an ultrasound, done at 6 weeks that revealed of viable single intrauterine pregnancy.  I discussed results with patient, I discussed plan to discharge home, antinausea medication, and she will follow up with her OBGYN.  Strict return precautions were discussed with patient understood and agree with, patient will be discharged.    [SE]      ED Course User Index  [SE] Fatuma Felix MD     Clinical Impression:       ICD-10-CM ICD-9-CM   1. Abdominal pain R10.9 789.00         Disposition:   Disposition: Discharged  Condition:  Stable                        Fatuma Felix MD  11/03/19 0109

## 2019-11-13 ENCOUNTER — ROUTINE PRENATAL (OUTPATIENT)
Dept: OBSTETRICS AND GYNECOLOGY | Facility: CLINIC | Age: 24
End: 2019-11-13
Payer: MEDICAID

## 2019-11-13 VITALS
BODY MASS INDEX: 24.33 KG/M2 | DIASTOLIC BLOOD PRESSURE: 60 MMHG | WEIGHT: 137.38 LBS | SYSTOLIC BLOOD PRESSURE: 112 MMHG

## 2019-11-13 DIAGNOSIS — Z3A.09 9 WEEKS GESTATION OF PREGNANCY: Primary | ICD-10-CM

## 2019-11-13 PROCEDURE — 99999 PR PBB SHADOW E&M-EST. PATIENT-LVL II: ICD-10-PCS | Mod: PBBFAC,,, | Performed by: OBSTETRICS & GYNECOLOGY

## 2019-11-13 PROCEDURE — 99212 OFFICE O/P EST SF 10 MIN: CPT | Mod: PBBFAC,PO | Performed by: OBSTETRICS & GYNECOLOGY

## 2019-11-13 PROCEDURE — 99213 PR OFFICE/OUTPT VISIT, EST, LEVL III, 20-29 MIN: ICD-10-PCS | Mod: S$PBB,TH,, | Performed by: OBSTETRICS & GYNECOLOGY

## 2019-11-13 PROCEDURE — 99213 OFFICE O/P EST LOW 20 MIN: CPT | Mod: S$PBB,TH,, | Performed by: OBSTETRICS & GYNECOLOGY

## 2019-11-13 PROCEDURE — 99999 PR PBB SHADOW E&M-EST. PATIENT-LVL II: CPT | Mod: PBBFAC,,, | Performed by: OBSTETRICS & GYNECOLOGY

## 2019-11-13 RX ORDER — PYRIDOXINE HCL (VITAMIN B6) 100 MG
100 TABLET ORAL
Qty: 60 TABLET | Refills: 3 | COMMUNITY
Start: 2019-11-13 | End: 2022-05-24

## 2019-11-13 RX ORDER — PROMETHAZINE HYDROCHLORIDE 25 MG/1
25 TABLET ORAL EVERY 6 HOURS PRN
Qty: 25 TABLET | Refills: 0 | Status: ON HOLD | OUTPATIENT
Start: 2019-11-13 | End: 2023-01-13 | Stop reason: HOSPADM

## 2019-11-13 NOTE — PROGRESS NOTES
No complaints  FHT verified w/ US    N/V meds sent to pharmacy   \  Roland Bryant M.D.   OB/GYN    11/13/2019

## 2020-01-07 ENCOUNTER — ROUTINE PRENATAL (OUTPATIENT)
Dept: OBSTETRICS AND GYNECOLOGY | Facility: CLINIC | Age: 25
End: 2020-01-07
Payer: MEDICAID

## 2020-01-07 ENCOUNTER — LAB VISIT (OUTPATIENT)
Dept: LAB | Facility: HOSPITAL | Age: 25
End: 2020-01-07
Attending: OBSTETRICS & GYNECOLOGY
Payer: MEDICAID

## 2020-01-07 VITALS
DIASTOLIC BLOOD PRESSURE: 70 MMHG | BODY MASS INDEX: 24.41 KG/M2 | WEIGHT: 137.81 LBS | SYSTOLIC BLOOD PRESSURE: 108 MMHG

## 2020-01-07 DIAGNOSIS — Z3A.17 17 WEEKS GESTATION OF PREGNANCY: ICD-10-CM

## 2020-01-07 DIAGNOSIS — Z3A.17 17 WEEKS GESTATION OF PREGNANCY: Primary | ICD-10-CM

## 2020-01-07 PROCEDURE — 99999 PR PBB SHADOW E&M-EST. PATIENT-LVL II: ICD-10-PCS | Mod: PBBFAC,,, | Performed by: OBSTETRICS & GYNECOLOGY

## 2020-01-07 PROCEDURE — 99213 PR OFFICE/OUTPT VISIT, EST, LEVL III, 20-29 MIN: ICD-10-PCS | Mod: S$PBB,TH,, | Performed by: OBSTETRICS & GYNECOLOGY

## 2020-01-07 PROCEDURE — 99212 OFFICE O/P EST SF 10 MIN: CPT | Mod: PBBFAC,PO | Performed by: OBSTETRICS & GYNECOLOGY

## 2020-01-07 PROCEDURE — 81511 FTL CGEN ABNOR FOUR ANAL: CPT

## 2020-01-07 PROCEDURE — 36415 COLL VENOUS BLD VENIPUNCTURE: CPT

## 2020-01-07 PROCEDURE — 99213 OFFICE O/P EST LOW 20 MIN: CPT | Mod: S$PBB,TH,, | Performed by: OBSTETRICS & GYNECOLOGY

## 2020-01-07 PROCEDURE — 99999 PR PBB SHADOW E&M-EST. PATIENT-LVL II: CPT | Mod: PBBFAC,,, | Performed by: OBSTETRICS & GYNECOLOGY

## 2020-01-07 NOTE — PROGRESS NOTES
No complaints today   No nausea or vomiting   Denies vaginal bleeding or cramping     Prenatal labs reviewed  Aneuploidy screen  = Quad today Anatomy US at 20 weeks   Consents=  Diabetes screen=  Growth US=  TdaP=  GBBS=  BC=     General Pregnancy  recommendations given  PNV + H2O intake    FU in 4 weeks      Roland Bryant M.D.   OB/GYN

## 2020-01-09 ENCOUNTER — TELEPHONE (OUTPATIENT)
Dept: OBSTETRICS AND GYNECOLOGY | Facility: CLINIC | Age: 25
End: 2020-01-09

## 2020-01-09 LAB
# FETUSES US: NORMAL
2ND TRIMESTER 4 SCREEN PNL SERPL: NEGATIVE
2ND TRIMESTER 4 SCREEN SERPL-IMP: NORMAL
AFP MOM SERPL: 0.92
AFP SERPL-MCNC: 38.3 NG/ML
AGE AT DELIVERY: 25
B-HCG MOM SERPL: 1.34
B-HCG SERPL-ACNC: 39.8 IU/ML
FET TS 21 RISK FROM MAT AGE: NORMAL
GA (DAYS): 0 D
GA (WEEKS): 17 WK
GA METHOD: NORMAL
IDDM PATIENT QL: NORMAL
INHIBIN A MOM SERPL: 1.89
INHIBIN A SERPL-MCNC: 308.9 PG/ML
SMOKING STATUS FTND: NO
TS 18 RISK FETUS: NORMAL
TS 21 RISK FETUS: NORMAL
U ESTRIOL MOM SERPL: 1.12
U ESTRIOL SERPL-MCNC: 1.36 NG/ML

## 2020-01-09 NOTE — PROGRESS NOTES
Quad Screen resulted:  SCREEN NEGATIVE FOR DOWN SYNDROME, NEURAL TUBE DEFECTS   AND TRISOMY 18     Roland Bryant M.D.   OB/GYN

## 2020-01-09 NOTE — TELEPHONE ENCOUNTER
Called and informed patient of results. Patient agreed and verbalized understanding.    Notes recorded by Roland Bryant MD on 1/9/2020 at 12:36 PM CST  Quad Screen resulted:  SCREEN NEGATIVE FOR DOWN SYNDROME, NEURAL TUBE DEFECTS   AND TRISOMY 18

## 2020-01-13 ENCOUNTER — HOSPITAL ENCOUNTER (EMERGENCY)
Facility: HOSPITAL | Age: 25
Discharge: HOME | End: 2020-01-13
Attending: EMERGENCY MEDICINE
Payer: MEDICAID

## 2020-01-13 VITALS
SYSTOLIC BLOOD PRESSURE: 108 MMHG | OXYGEN SATURATION: 98 % | HEART RATE: 99 BPM | DIASTOLIC BLOOD PRESSURE: 58 MMHG | BODY MASS INDEX: 24.1 KG/M2 | WEIGHT: 136 LBS | HEIGHT: 63 IN | RESPIRATION RATE: 20 BRPM | TEMPERATURE: 99 F

## 2020-01-13 DIAGNOSIS — B34.9 VIRAL ILLNESS: Primary | ICD-10-CM

## 2020-01-13 LAB
DEPRECATED S PYO AG THROAT QL EIA: NEGATIVE
INFLUENZA A, MOLECULAR: NEGATIVE
INFLUENZA B, MOLECULAR: NEGATIVE
SPECIMEN SOURCE: NORMAL

## 2020-01-13 PROCEDURE — 99283 EMERGENCY DEPT VISIT LOW MDM: CPT

## 2020-01-13 PROCEDURE — 87880 STREP A ASSAY W/OPTIC: CPT

## 2020-01-13 PROCEDURE — 25000003 PHARM REV CODE 250: Performed by: EMERGENCY MEDICINE

## 2020-01-13 PROCEDURE — 87502 INFLUENZA DNA AMP PROBE: CPT

## 2020-01-13 PROCEDURE — 87081 CULTURE SCREEN ONLY: CPT

## 2020-01-13 RX ORDER — TRAMADOL HYDROCHLORIDE AND ACETAMINOPHEN 37.5; 325 MG/1; MG/1
1 TABLET, FILM COATED ORAL EVERY 6 HOURS PRN
Qty: 9 TABLET | Refills: 0 | Status: SHIPPED | OUTPATIENT
Start: 2020-01-13 | End: 2020-01-13

## 2020-01-13 RX ORDER — ONDANSETRON 8 MG/1
8 TABLET, ORALLY DISINTEGRATING ORAL
Status: COMPLETED | OUTPATIENT
Start: 2020-01-13 | End: 2020-01-13

## 2020-01-13 RX ORDER — FAMOTIDINE 20 MG/1
20 TABLET, FILM COATED ORAL
Status: COMPLETED | OUTPATIENT
Start: 2020-01-13 | End: 2020-01-13

## 2020-01-13 RX ADMIN — ONDANSETRON 8 MG: 8 TABLET, ORALLY DISINTEGRATING ORAL at 05:01

## 2020-01-13 RX ADMIN — FAMOTIDINE 20 MG: 20 TABLET ORAL at 05:01

## 2020-01-13 NOTE — PROVIDER PROGRESS NOTES - EMERGENCY DEPT.
Encounter Date: 1/13/2020    ED Physician Progress Notes           I initially evaluated this patient and ordered a workup while in triage.  The patient will receive a full evaluation in the ED when space is available.  All results from tests ordered in triage will not be followed by the intake team, including myself. All results will be followed by the ED Main or [place] provider.    This is a 24 y.o. female who presents with cough, congestion, bilateral ear pain x3 days.  Pt has emesis of food x5 today only, assoc with nausea.   Focused Exam unremarkable. Mucous memb moist, no significant pharyngeal erythema or exudates.  Working diagnoses includes but is not limited to: URI, viral syndrome, flu, less likely consider pneumonia. No UTI symptoms, no abd pain, no cramping, no vag bleeding or discharge.   Plan includes flu, strep, assess heart tones, zofran, pepcid, reassess vitals.

## 2020-01-13 NOTE — ED PROVIDER NOTES
Encounter Date: 1/13/2020    SCRIBE #1 NOTE: I, Gloria Avitia, am scribing for, and in the presence of,  Dr. Noam Tom. I have scribed the entire note.       History     Chief Complaint   Patient presents with    Fever     c/o fever, body aches, sore throat, and cough x3 days. Also c/o frequent vomiting. Pt is 17 weeks pregnant and sees Dr. Bryant. Denies abdominal pain or vaginal bleeding.      This is a 24 y.o. female who is 17 weeks pregnant and  has no past medical history on file. Presents with chief complaint of fever, body aches, sore throat and dry cough with onset three days ago. Patient reports fever has been constant with associated emesis. Patient denies any chills, diarrhea, constipation, frequent urination, dysuria, hematuria, vaginal bleeding, abdominal pain or any other symptoms at this time. Of note, patient does have an OB GYN, Dr. Bryant.    The history is provided by the patient.     Review of patient's allergies indicates:  No Known Allergies  History reviewed. No pertinent past medical history.  Past Surgical History:   Procedure Laterality Date    DILATION AND CURETTAGE OF UTERUS  04/2019    [place]     Family History   Problem Relation Age of Onset    Hypertension Father      Social History     Tobacco Use    Smoking status: Never Smoker    Smokeless tobacco: Never Used   Substance Use Topics    Alcohol use: Not Currently    Drug use: Never     Review of Systems   Constitutional: Positive for fever. Negative for chills and fatigue.   HENT: Positive for sore throat. Negative for facial swelling, trouble swallowing and voice change.    Eyes: Negative for photophobia, pain and redness.   Respiratory: Positive for cough. Negative for choking and shortness of breath.    Cardiovascular: Negative for chest pain, palpitations and leg swelling.   Gastrointestinal: Negative for abdominal pain, diarrhea, nausea and vomiting.   Genitourinary: Negative for difficulty urinating, frequency and  urgency.   Musculoskeletal: Positive for myalgias. Negative for back pain, neck pain and neck stiffness.   Neurological: Negative for seizures, speech difficulty, light-headedness, numbness and headaches.   All other systems reviewed and are negative.      Physical Exam     Initial Vitals [01/13/20 1725]   BP Pulse Resp Temp SpO2   (!) 108/58 99 20 99.2 °F (37.3 °C) 98 %      MAP       --         Physical Exam    Constitutional:  Non-toxic appearance. No distress.   HENT:   Head: Normocephalic and atraumatic.   Mouth/Throat: Oropharynx is clear and moist. No oropharyngeal exudate.   Eyes: Conjunctivae and EOM are normal. Pupils are equal, round, and reactive to light. Right eye exhibits no nystagmus. Left eye exhibits no nystagmus.   Neck: Neck supple.   Cardiovascular: Normal rate, regular rhythm, S1 normal and S2 normal.   No murmur heard.  Pulmonary/Chest: Breath sounds normal. No respiratory distress. She has no wheezes.   Neurological: She is alert. No cranial nerve deficit. GCS eye subscore is 4. GCS verbal subscore is 5. GCS motor subscore is 6.   Skin: Skin is warm. No rash noted.   Psychiatric: She has a normal mood and affect. Her behavior is normal.         ED Course   Procedures  Labs Reviewed   INFLUENZA A & B BY MOLECULAR   THROAT SCREEN, RAPID   CULTURE, STREP A,  THROAT             Medical Decision Making:   Clinical Tests:   Lab Tests: Ordered and Reviewed              Attending Attestation:           Physician Attestation for Scribe:  Physician Attestation Statement for Scribe #1: I, Dr. Noam Tom, reviewed documentation, as scribed by Gloria Avitia in my presence, and it is both accurate and complete.                         Patient comfortable with plan and will follow up outpatient      Clinical Impression:       ICD-10-CM ICD-9-CM   1. Viral illness B34.9 079.99         Disposition:   Disposition: Discharged  Condition: Stable                     Noam Tom MD  01/13/20 2012

## 2020-01-14 NOTE — ED NOTES
Patient identifiers for [place] checked and correct.    LOC: The patient is awake, alert and aware of environment with an appropriate affect, the patient is oriented x 3 and speaking appropriately.  APPEARANCE: Patient resting comfortably and in no acute distress, patient is clean and well groomed, patient's clothing are properly fastened.  SKIN: The skin is warm and dry, patient has age appropriate skin turgor and moist mucus membranes, skin intact, no breakdown or bruising noted.  MUSCULOSKELETAL: Patient moving all extremities well, no obvious swelling or deformities noted.  RESPIRATORY: Airway is open and patent, respirations are spontaneous, patient has a normal effort and rate, no accessory muscle use noted.  Clear breath sounds bilaterally.  CARDIAC: Patient has a normal rate and rhythm, no periphreal edema noted, capillary refill < 3 seconds.  ABDOMEN: Soft and non tender to palpation, no distention noted.  Normoactive bowel sounds x4.  NEUROLOGIC: PERRL, facial expression is symmetrical, bilateral hand grasp equal and even, normal sensation in all extremities when touched with a finger.

## 2020-01-16 LAB — BACTERIA THROAT CULT: NORMAL

## 2020-01-24 ENCOUNTER — TELEPHONE (OUTPATIENT)
Dept: OBSTETRICS AND GYNECOLOGY | Facility: CLINIC | Age: 25
End: 2020-01-24

## 2020-01-24 DIAGNOSIS — Z3A.20 20 WEEKS GESTATION OF PREGNANCY: Primary | ICD-10-CM

## 2020-01-28 ENCOUNTER — PROCEDURE VISIT (OUTPATIENT)
Dept: OBSTETRICS AND GYNECOLOGY | Facility: CLINIC | Age: 25
End: 2020-01-28
Payer: MEDICAID

## 2020-01-28 ENCOUNTER — ROUTINE PRENATAL (OUTPATIENT)
Dept: OBSTETRICS AND GYNECOLOGY | Facility: CLINIC | Age: 25
End: 2020-01-28
Payer: MEDICAID

## 2020-01-28 VITALS
WEIGHT: 142.88 LBS | SYSTOLIC BLOOD PRESSURE: 114 MMHG | BODY MASS INDEX: 25.31 KG/M2 | DIASTOLIC BLOOD PRESSURE: 58 MMHG

## 2020-01-28 DIAGNOSIS — Z36.89 ENCOUNTER FOR FETAL ANATOMIC SURVEY: ICD-10-CM

## 2020-01-28 DIAGNOSIS — Z3A.20 20 WEEKS GESTATION OF PREGNANCY: Primary | ICD-10-CM

## 2020-01-28 DIAGNOSIS — Z3A.20 20 WEEKS GESTATION OF PREGNANCY: ICD-10-CM

## 2020-01-28 PROCEDURE — 99999 PR PBB SHADOW E&M-EST. PATIENT-LVL II: CPT | Mod: PBBFAC,,, | Performed by: OBSTETRICS & GYNECOLOGY

## 2020-01-28 PROCEDURE — 99213 OFFICE O/P EST LOW 20 MIN: CPT | Mod: S$PBB,TH,, | Performed by: OBSTETRICS & GYNECOLOGY

## 2020-01-28 PROCEDURE — 76805 OB US >/= 14 WKS SNGL FETUS: CPT | Mod: 26,S$PBB,, | Performed by: OBSTETRICS & GYNECOLOGY

## 2020-01-28 PROCEDURE — 99212 OFFICE O/P EST SF 10 MIN: CPT | Mod: PBBFAC,PO | Performed by: OBSTETRICS & GYNECOLOGY

## 2020-01-28 PROCEDURE — 76805 OB US >/= 14 WKS SNGL FETUS: CPT | Mod: PBBFAC,PO | Performed by: OBSTETRICS & GYNECOLOGY

## 2020-01-28 PROCEDURE — 99213 PR OFFICE/OUTPT VISIT, EST, LEVL III, 20-29 MIN: ICD-10-PCS | Mod: S$PBB,TH,, | Performed by: OBSTETRICS & GYNECOLOGY

## 2020-01-28 PROCEDURE — 99999 PR PBB SHADOW E&M-EST. PATIENT-LVL II: ICD-10-PCS | Mod: PBBFAC,,, | Performed by: OBSTETRICS & GYNECOLOGY

## 2020-01-28 PROCEDURE — 76805 PR US, OB 14+WKS, TRANSABD, SINGLE GESTATION: ICD-10-PCS | Mod: 26,S$PBB,, | Performed by: OBSTETRICS & GYNECOLOGY

## 2020-01-28 NOTE — PROGRESS NOTES
No complaints today   No nausea or vomiting   Denies vaginal bleeding or cramping     Prenatal labs reviewed  Aneuploidy screen  = Quad today Anatomy US at 20 weeks   Consents= signed   Diabetes screen=  Growth US=  TdaP=  GBBS=  BC=     General Pregnancy  recommendations given  PNV + H2O intake    FU in 4 weeks      Roland Bryant M.D.   OB/GYN

## 2020-02-26 ENCOUNTER — ROUTINE PRENATAL (OUTPATIENT)
Dept: OBSTETRICS AND GYNECOLOGY | Facility: CLINIC | Age: 25
End: 2020-02-26
Payer: MEDICAID

## 2020-02-26 VITALS — WEIGHT: 153 LBS | DIASTOLIC BLOOD PRESSURE: 54 MMHG | SYSTOLIC BLOOD PRESSURE: 112 MMHG | BODY MASS INDEX: 27.1 KG/M2

## 2020-02-26 DIAGNOSIS — Z34.83 ENCOUNTER FOR SUPERVISION OF OTHER NORMAL PREGNANCY IN THIRD TRIMESTER: Primary | ICD-10-CM

## 2020-02-26 DIAGNOSIS — Z3A.24 24 WEEKS GESTATION OF PREGNANCY: ICD-10-CM

## 2020-02-26 PROCEDURE — 99213 PR OFFICE/OUTPT VISIT, EST, LEVL III, 20-29 MIN: ICD-10-PCS | Mod: S$PBB,TH,, | Performed by: OBSTETRICS & GYNECOLOGY

## 2020-02-26 PROCEDURE — 99999 PR PBB SHADOW E&M-EST. PATIENT-LVL II: CPT | Mod: PBBFAC,,, | Performed by: OBSTETRICS & GYNECOLOGY

## 2020-02-26 PROCEDURE — 99212 OFFICE O/P EST SF 10 MIN: CPT | Mod: PBBFAC,TH,PO | Performed by: OBSTETRICS & GYNECOLOGY

## 2020-02-26 PROCEDURE — 99213 OFFICE O/P EST LOW 20 MIN: CPT | Mod: S$PBB,TH,, | Performed by: OBSTETRICS & GYNECOLOGY

## 2020-02-26 PROCEDURE — 99999 PR PBB SHADOW E&M-EST. PATIENT-LVL II: ICD-10-PCS | Mod: PBBFAC,,, | Performed by: OBSTETRICS & GYNECOLOGY

## 2020-02-26 NOTE — PROGRESS NOTES
No complaints today   No nausea or vomiting   Denies vaginal bleeding or cramping     Prenatal labs reviewed  Aneuploidy screen  = Quad today   Anatomy   US at 20 weeks incomplete, will need @30w   Consents= signed   Diabetes screen=  Growth US=  TdaP=  GBBS=  BC=     General Pregnancy  recommendations given  PNV + H2O intake    FU in 4 weeks      Roland Bryant M.D.   OB/GYN

## 2020-02-26 NOTE — PROGRESS NOTES
No complaints today   No nausea or vomiting   Denies vaginal bleeding or cramping     Prenatal labs reviewed  Aneuploidy screen  = Quad today   Anatomy US at 20 weeks incomplete   Consents= signed   Diabetes screen=  Growth US=  TdaP=  GBBS=  BC=     General Pregnancy  recommendations given  PNV + H2O intake    FU in 4 weeks      Roland Bryant M.D.   OB/GYN

## 2020-03-02 ENCOUNTER — TELEPHONE (OUTPATIENT)
Dept: OBSTETRICS AND GYNECOLOGY | Facility: CLINIC | Age: 25
End: 2020-03-02

## 2020-03-02 NOTE — TELEPHONE ENCOUNTER
----- Message from Lizette Dumont sent at 3/2/2020  1:45 PM CST -----  Contact: Self [phone]  Patient is 25 weeks pregnant and is calling to see if he can get a letter to get dental work done. Please advice.

## 2020-03-13 ENCOUNTER — HOSPITAL ENCOUNTER (OUTPATIENT)
Facility: HOSPITAL | Age: 25
Discharge: HOME | End: 2020-03-13
Attending: OBSTETRICS & GYNECOLOGY | Admitting: OBSTETRICS & GYNECOLOGY
Payer: MEDICAID

## 2020-03-13 VITALS — TEMPERATURE: 99 F | WEIGHT: 142 LBS | BODY MASS INDEX: 25.15 KG/M2

## 2020-03-13 DIAGNOSIS — N89.8 VAGINAL DISCHARGE DURING PREGNANCY IN SECOND TRIMESTER: ICD-10-CM

## 2020-03-13 DIAGNOSIS — O26.892 VAGINAL DISCHARGE DURING PREGNANCY IN SECOND TRIMESTER: ICD-10-CM

## 2020-03-13 PROCEDURE — 99213 OFFICE O/P EST LOW 20 MIN: CPT | Mod: TH,,, | Performed by: OBSTETRICS & GYNECOLOGY

## 2020-03-13 PROCEDURE — 25000003 PHARM REV CODE 250: Performed by: OBSTETRICS & GYNECOLOGY

## 2020-03-13 PROCEDURE — 99213 PR OFFICE/OUTPT VISIT, EST, LEVL III, 20-29 MIN: ICD-10-PCS | Mod: TH,,, | Performed by: OBSTETRICS & GYNECOLOGY

## 2020-03-13 PROCEDURE — 99211 OFF/OP EST MAY X REQ PHY/QHP: CPT

## 2020-03-13 PROCEDURE — 63600175 PHARM REV CODE 636 W HCPCS: Performed by: OBSTETRICS & GYNECOLOGY

## 2020-03-13 RX ORDER — BUTALBITAL, ACETAMINOPHEN AND CAFFEINE 50; 325; 40 MG/1; MG/1; MG/1
1 TABLET ORAL EVERY 4 HOURS PRN
Status: DISCONTINUED | OUTPATIENT
Start: 2020-03-13 | End: 2020-03-13 | Stop reason: HOSPADM

## 2020-03-13 RX ORDER — ACETAMINOPHEN 500 MG
500 TABLET ORAL EVERY 6 HOURS PRN
Status: DISCONTINUED | OUTPATIENT
Start: 2020-03-13 | End: 2020-03-13 | Stop reason: HOSPADM

## 2020-03-13 RX ORDER — ONDANSETRON 8 MG/1
8 TABLET, ORALLY DISINTEGRATING ORAL EVERY 8 HOURS PRN
Status: DISCONTINUED | OUTPATIENT
Start: 2020-03-13 | End: 2020-03-13 | Stop reason: HOSPADM

## 2020-03-13 RX ORDER — SODIUM CHLORIDE 9 MG/ML
INJECTION, SOLUTION INTRAVENOUS CONTINUOUS
Status: DISCONTINUED | OUTPATIENT
Start: 2020-03-13 | End: 2020-03-13 | Stop reason: HOSPADM

## 2020-03-13 RX ADMIN — SODIUM CHLORIDE, SODIUM LACTATE, POTASSIUM CHLORIDE, AND CALCIUM CHLORIDE 1000 ML: .6; .31; .03; .02 INJECTION, SOLUTION INTRAVENOUS at 08:03

## 2020-03-13 RX ADMIN — BUTALBITAL, ACETAMINOPHEN, AND CAFFEINE 1 TABLET: 50; 325; 40 TABLET ORAL at 06:03

## 2020-03-13 NOTE — PROGRESS NOTES
24 yo  female @ 26.3 wks who presents with vaginal discharge since 3pm today.  Also reports bad headache not improved with tylenol.  On vaginal exam, min fluid/discharge noted in the vagina.  Sterile vaginal exam revealed closed cervix.  Patient afebrile.  BP wnl.  FHT: 150bpm, [id number], no decels  [place]: quiet    AP:   Vaginal discharge, patient NOT ruptured from my vaginal exam - patient reassured  Will give fioricet for HA.  Patient to f/u with clarisse once discharged.  NST x at least 30 min    sophia hernandez MD

## 2020-03-14 NOTE — NURSING
"Notified Dr. Bang that patient reports her headache is "a little" better. Order received for fluid bolus x1 and re-evaluate pain.   "

## 2020-03-14 NOTE — DISCHARGE INSTRUCTIONS
1 Dolor de hugo intenso que no se susan con sandra dosis de Tylenol.    2 Vision borrosa o tia manchas alina de karina ojos.    3 Ninchazon repentino en la justin o obdulio.     4 Aumento de peso en solo unos pocos marquez.    5 Dolor de estomago o calambres.    6 Vomito que dura mas de 24 horas.    7 Fiebre mas de 100.4 grados.    8 Sangrado vaginal que es algo mas que manonar.    9 Secrecion vaginal excesiva e in usual.    10 Un flujo de liquido acvoso de la vagina.    11 Avsencia de movmiento del lesvia significohiva comenzando en 24-36 semanas.    12 Menos de 37 semanas: mas de 4 contracciones en sandra hora por 2 horas    13 Mas de 37 semanas: contracciones coda 5 minutos por 2 horas.    14 Katt 8-10 vasos.    Sequimiento con ambrocio medico cadacita.     Home Undelivered Discharge Instructions    After Discharge Orders:    Future Appointments   Date Time Provider Department Center   3/25/2020  1:15 PM Roland Bryant MD [facility]           Current Discharge Medication List      CONTINUE these medications which have NOT CHANGED    Details   prenatal 21-iron fu-folic acid (PRENATAL COMPLETE) 14 mg iron- 400 mcg Tab Take 1 tablet by mouth once daily.  Qty: 30 tablet, Refills: 0      pyridoxine, vitamin B6, (B-6) 100 MG Tab Take 1 tablet (100 mg total) by mouth 3 (three) times daily before meals.  Qty: 60 tablet, Refills: 3      prenatal vit,katie 74-iron-folic 27 mg iron- 1 mg Tab Take 1 tablet by mouth once daily.  Qty: 90 tablet, Refills: 3      promethazine (PHENERGAN) 25 MG tablet Take 1 tablet (25 mg total) by mouth every 6 (six) hours as needed for Nausea.  Qty: 25 tablet, Refills: 0                     · Diet:  normal diet as tolerated    · Rest: normal activity as tolerated    Other instructions: Do kick counts once a day on your baby. Choose the time of day your baby is most active. Get in a comfortable lying or sitting position and time how long it takes to feel 10 kicks, twists, turns, swishes, or rolls.  Call your physician or midwife if there have not been 10 kicks in 1 hours    Call physician or midwife, return to Labor and Delivery, call 911, or go to the nearest Emergency Room if: increased leakage or fluid, decreased fetal movement, persistent low back pain or cramping, bleeding from vaginal area, difficulty urinating, pain with urination, difficulty breathing, new calf pain, persistent headache or vision change

## 2020-03-14 NOTE — NURSING
Discussed plan of care with patient including education on fioricet for head ache pain. Patient verbalized understanding. States headache pain is 9/10. Post administration, darkened room and instructed patient to call for assistance.

## 2020-03-25 ENCOUNTER — CLINICAL SUPPORT (OUTPATIENT)
Dept: OBSTETRICS AND GYNECOLOGY | Facility: CLINIC | Age: 25
End: 2020-03-25
Payer: MEDICAID

## 2020-03-25 ENCOUNTER — ROUTINE PRENATAL (OUTPATIENT)
Dept: OBSTETRICS AND GYNECOLOGY | Facility: CLINIC | Age: 25
End: 2020-03-25
Payer: MEDICAID

## 2020-03-25 ENCOUNTER — LAB VISIT (OUTPATIENT)
Dept: LAB | Facility: HOSPITAL | Age: 25
End: 2020-03-25
Attending: OBSTETRICS & GYNECOLOGY
Payer: MEDICAID

## 2020-03-25 VITALS — DIASTOLIC BLOOD PRESSURE: 64 MMHG | SYSTOLIC BLOOD PRESSURE: 102 MMHG | WEIGHT: 158.5 LBS | BODY MASS INDEX: 28.08 KG/M2

## 2020-03-25 DIAGNOSIS — Z3A.28 28 WEEKS GESTATION OF PREGNANCY: ICD-10-CM

## 2020-03-25 DIAGNOSIS — Z34.82 ENCOUNTER FOR SUPERVISION OF OTHER NORMAL PREGNANCY IN SECOND TRIMESTER: ICD-10-CM

## 2020-03-25 DIAGNOSIS — Z3A.28 28 WEEKS GESTATION OF PREGNANCY: Primary | ICD-10-CM

## 2020-03-25 DIAGNOSIS — Z23 NEED FOR DIPHTHERIA-TETANUS-PERTUSSIS (TDAP) VACCINE: Primary | ICD-10-CM

## 2020-03-25 LAB — GLUCOSE SERPL-MCNC: 200 MG/DL (ref 70–140)

## 2020-03-25 PROCEDURE — 99213 PR OFFICE/OUTPT VISIT, EST, LEVL III, 20-29 MIN: ICD-10-PCS | Mod: S$PBB,TH,, | Performed by: OBSTETRICS & GYNECOLOGY

## 2020-03-25 PROCEDURE — 82950 GLUCOSE TEST: CPT

## 2020-03-25 PROCEDURE — 99999 PR PBB SHADOW E&M-EST. PATIENT-LVL II: ICD-10-PCS | Mod: PBBFAC,,, | Performed by: OBSTETRICS & GYNECOLOGY

## 2020-03-25 PROCEDURE — 99999 PR PBB SHADOW E&M-EST. PATIENT-LVL II: CPT | Mod: PBBFAC,,, | Performed by: OBSTETRICS & GYNECOLOGY

## 2020-03-25 PROCEDURE — 99212 OFFICE O/P EST SF 10 MIN: CPT | Mod: PBBFAC,TH,PO,25 | Performed by: OBSTETRICS & GYNECOLOGY

## 2020-03-25 PROCEDURE — 90471 IMMUNIZATION ADMIN: CPT | Mod: PBBFAC,PO

## 2020-03-25 PROCEDURE — 99213 OFFICE O/P EST LOW 20 MIN: CPT | Mod: S$PBB,TH,, | Performed by: OBSTETRICS & GYNECOLOGY

## 2020-03-25 PROCEDURE — 36415 COLL VENOUS BLD VENIPUNCTURE: CPT

## 2020-03-25 NOTE — PROGRESS NOTES
3/25/20 at 1315 administered 0.5 ml of Adacel (Tdap) to R deltoid.   Pt tolerated well.   Pt advised to wait 15 minutes before leaving the office.   Pt verbalized understanding.   Pt received VIS.  Lot:[id number]   Exp: 2/25/22  Man: [employer]

## 2020-03-25 NOTE — PROGRESS NOTES
No complaints today   No nausea or vomiting   Denies vaginal bleeding or cramping     Prenatal labs reviewed  Aneuploidy screen  = Quad today   Anatomy   US at 20 weeks incomplete, will need @30w   Consents= signed   Diabetes screen= today  Growth US=   TdaP= given   GBBS=  BC=     General Pregnancy  recommendations given  PNV + H2O intake    FU in 4 weeks      Roland Bryant M.D.   OB/GYN

## 2020-03-26 ENCOUNTER — TELEPHONE (OUTPATIENT)
Dept: OBSTETRICS AND GYNECOLOGY | Facility: HOSPITAL | Age: 25
End: 2020-03-26

## 2020-03-26 DIAGNOSIS — Z3A.28 28 WEEKS GESTATION OF PREGNANCY: Primary | ICD-10-CM

## 2020-03-26 NOTE — TELEPHONE ENCOUNTER
Patient informed that OB glucose screen was abnormal and that she needs a 3hr GTT. Patient verbalized understanding and is scheduled for tomorrow.

## 2020-03-27 ENCOUNTER — LAB VISIT (OUTPATIENT)
Dept: LAB | Facility: HOSPITAL | Age: 25
End: 2020-03-27
Attending: OBSTETRICS & GYNECOLOGY
Payer: MEDICAID

## 2020-03-27 DIAGNOSIS — Z3A.28 28 WEEKS GESTATION OF PREGNANCY: ICD-10-CM

## 2020-03-27 LAB
GLUCOSE SERPL-MCNC: 155 MG/DL
GLUCOSE SERPL-MCNC: 173 MG/DL
GLUCOSE SERPL-MCNC: 182 MG/DL
GLUCOSE SERPL-MCNC: 86 MG/DL (ref 70–110)

## 2020-03-27 PROCEDURE — 82952 GTT-ADDED SAMPLES: CPT

## 2020-03-27 PROCEDURE — 82951 GLUCOSE TOLERANCE TEST (GTT): CPT

## 2020-03-27 PROCEDURE — 36415 COLL VENOUS BLD VENIPUNCTURE: CPT

## 2020-03-30 ENCOUNTER — TELEPHONE (OUTPATIENT)
Dept: OBSTETRICS AND GYNECOLOGY | Facility: CLINIC | Age: 25
End: 2020-03-30

## 2020-03-30 DIAGNOSIS — O24.410 DIET CONTROLLED GESTATIONAL DIABETES MELLITUS (GDM) IN THIRD TRIMESTER: ICD-10-CM

## 2020-03-30 DIAGNOSIS — Z3A.28 28 WEEKS GESTATION OF PREGNANCY: Primary | ICD-10-CM

## 2020-03-30 NOTE — TELEPHONE ENCOUNTER
Abnormal 3 hr GTT   Consistent with Gestational Diabetes   Needs  Diabetes education referral Blood sugar patterning  And diet recommendations     Order in ELDER Bryant M.D.   OB/GYN    3/30/2020

## 2020-03-31 NOTE — TELEPHONE ENCOUNTER
Patient informed that her 3hr GTT was abnormal and that a referral for Diabetes education was put in and that they will call her to set up an appointment. Patient verbalized understanding.

## 2020-04-09 ENCOUNTER — ROUTINE PRENATAL (OUTPATIENT)
Dept: OBSTETRICS AND GYNECOLOGY | Facility: CLINIC | Age: 25
End: 2020-04-09
Payer: MEDICAID

## 2020-04-09 ENCOUNTER — PROCEDURE VISIT (OUTPATIENT)
Dept: OBSTETRICS AND GYNECOLOGY | Facility: CLINIC | Age: 25
End: 2020-04-09
Payer: MEDICAID

## 2020-04-09 ENCOUNTER — HOSPITAL ENCOUNTER (OUTPATIENT)
Facility: HOSPITAL | Age: 25
Discharge: HOME | End: 2020-04-09
Attending: OBSTETRICS & GYNECOLOGY | Admitting: OBSTETRICS & GYNECOLOGY
Payer: MEDICAID

## 2020-04-09 VITALS
BODY MASS INDEX: 28.55 KG/M2 | SYSTOLIC BLOOD PRESSURE: 110 MMHG | WEIGHT: 161.19 LBS | DIASTOLIC BLOOD PRESSURE: 58 MMHG

## 2020-04-09 VITALS
HEIGHT: 63 IN | DIASTOLIC BLOOD PRESSURE: 53 MMHG | HEART RATE: 80 BPM | TEMPERATURE: 99 F | BODY MASS INDEX: 28.35 KG/M2 | WEIGHT: 160 LBS | OXYGEN SATURATION: 100 % | SYSTOLIC BLOOD PRESSURE: 96 MMHG

## 2020-04-09 DIAGNOSIS — Z3A.28 28 WEEKS GESTATION OF PREGNANCY: ICD-10-CM

## 2020-04-09 DIAGNOSIS — Z3A.31 31 WEEKS GESTATION OF PREGNANCY: Primary | ICD-10-CM

## 2020-04-09 DIAGNOSIS — Z34.82 ENCOUNTER FOR SUPERVISION OF OTHER NORMAL PREGNANCY IN SECOND TRIMESTER: ICD-10-CM

## 2020-04-09 DIAGNOSIS — R10.9 ABDOMINAL PAIN: ICD-10-CM

## 2020-04-09 DIAGNOSIS — O24.410 DIET CONTROLLED GESTATIONAL DIABETES MELLITUS (GDM) IN THIRD TRIMESTER: ICD-10-CM

## 2020-04-09 LAB
ABO + RH BLD: NORMAL
BASOPHILS # BLD AUTO: 0.02 K/UL (ref 0–0.2)
BASOPHILS NFR BLD: 0.2 % (ref 0–1.9)
BILIRUB UR QL STRIP: NEGATIVE
BLD GP AB SCN CELLS X3 SERPL QL: NORMAL
CLARITY UR: CLEAR
COLOR UR: YELLOW
DIFFERENTIAL METHOD: ABNORMAL
EOSINOPHIL # BLD AUTO: 0.1 K/UL (ref 0–0.5)
EOSINOPHIL NFR BLD: 1.3 % (ref 0–8)
ERYTHROCYTE [DISTWIDTH] IN BLOOD BY AUTOMATED COUNT: 13.9 % (ref 11.5–14.5)
GLUCOSE UR QL STRIP: NEGATIVE
HCT VFR BLD AUTO: 35.4 % (ref 37–48.5)
HGB BLD-MCNC: 11.5 G/DL (ref 12–16)
HGB UR QL STRIP: NEGATIVE
IMM GRANULOCYTES # BLD AUTO: 0.07 K/UL (ref 0–0.04)
IMM GRANULOCYTES NFR BLD AUTO: 0.8 % (ref 0–0.5)
KETONES UR QL STRIP: NEGATIVE
LEUKOCYTE ESTERASE UR QL STRIP: ABNORMAL
LYMPHOCYTES # BLD AUTO: 2.4 K/UL (ref 1–4.8)
LYMPHOCYTES NFR BLD: 27.6 % (ref 18–48)
MCH RBC QN AUTO: 30.1 PG (ref 27–31)
MCHC RBC AUTO-ENTMCNC: 32.5 G/DL (ref 32–36)
MCV RBC AUTO: 93 FL (ref 82–98)
MICROSCOPIC COMMENT: NORMAL
MONOCYTES # BLD AUTO: 0.6 K/UL (ref 0.3–1)
MONOCYTES NFR BLD: 6.8 % (ref 4–15)
NEUTROPHILS # BLD AUTO: 5.4 K/UL (ref 1.8–7.7)
NEUTROPHILS NFR BLD: 63.3 % (ref 38–73)
NITRITE UR QL STRIP: NEGATIVE
NRBC BLD-RTO: 0 /100 WBC
PH UR STRIP: 7 [PH] (ref 5–8)
PLATELET # BLD AUTO: 255 K/UL (ref 150–350)
PMV BLD AUTO: 10.3 FL (ref 9.2–12.9)
PROT UR QL STRIP: NEGATIVE
RBC # BLD AUTO: 3.82 M/UL (ref 4–5.4)
SP GR UR STRIP: 1.01 (ref 1–1.03)
URN SPEC COLLECT METH UR: ABNORMAL
UROBILINOGEN UR STRIP-ACNC: NEGATIVE EU/DL
WBC # BLD AUTO: 8.54 K/UL (ref 3.9–12.7)
WBC #/AREA URNS HPF: 0 /HPF (ref 0–5)

## 2020-04-09 PROCEDURE — 86901 BLOOD TYPING SEROLOGIC RH(D): CPT

## 2020-04-09 PROCEDURE — 99999 PR PBB SHADOW E&M-EST. PATIENT-LVL II: CPT | Mod: PBBFAC,,, | Performed by: OBSTETRICS & GYNECOLOGY

## 2020-04-09 PROCEDURE — 76816 OB US FOLLOW-UP PER FETUS: CPT | Mod: PBBFAC,PO | Performed by: PEDIATRICS

## 2020-04-09 PROCEDURE — 99214 PR OFFICE/OUTPT VISIT, EST, LEVL IV, 30-39 MIN: ICD-10-PCS | Mod: S$PBB,TH,, | Performed by: OBSTETRICS & GYNECOLOGY

## 2020-04-09 PROCEDURE — 99214 OFFICE O/P EST MOD 30 MIN: CPT | Mod: S$PBB,TH,, | Performed by: OBSTETRICS & GYNECOLOGY

## 2020-04-09 PROCEDURE — 99212 OFFICE O/P EST SF 10 MIN: CPT | Mod: PBBFAC,25,27,TH,PO | Performed by: OBSTETRICS & GYNECOLOGY

## 2020-04-09 PROCEDURE — 99499 UNLISTED E&M SERVICE: CPT | Mod: S$PBB,,, | Performed by: PEDIATRICS

## 2020-04-09 PROCEDURE — 99499 NO LOS: ICD-10-PCS | Mod: S$PBB,,, | Performed by: PEDIATRICS

## 2020-04-09 PROCEDURE — 25000003 PHARM REV CODE 250: Performed by: OBSTETRICS & GYNECOLOGY

## 2020-04-09 PROCEDURE — 76816 OB US FOLLOW-UP PER FETUS: CPT | Mod: 26,S$PBB,, | Performed by: PEDIATRICS

## 2020-04-09 PROCEDURE — 85025 COMPLETE CBC W/AUTO DIFF WBC: CPT

## 2020-04-09 PROCEDURE — 81000 URINALYSIS NONAUTO W/SCOPE: CPT

## 2020-04-09 PROCEDURE — 99999 PR PBB SHADOW E&M-EST. PATIENT-LVL II: ICD-10-PCS | Mod: PBBFAC,,, | Performed by: OBSTETRICS & GYNECOLOGY

## 2020-04-09 PROCEDURE — 36415 COLL VENOUS BLD VENIPUNCTURE: CPT

## 2020-04-09 PROCEDURE — 76816 PR  US,PREGNANT UTERUS,F/U,TRANSABD APP: ICD-10-PCS | Mod: 26,S$PBB,, | Performed by: PEDIATRICS

## 2020-04-09 PROCEDURE — 99211 OFF/OP EST MAY X REQ PHY/QHP: CPT

## 2020-04-09 RX ORDER — ACETAMINOPHEN 325 MG/1
650 TABLET ORAL ONCE
Status: COMPLETED | OUTPATIENT
Start: 2020-04-09 | End: 2020-04-09

## 2020-04-09 RX ADMIN — ACETAMINOPHEN 650 MG: 325 TABLET ORAL at 08:04

## 2020-04-09 NOTE — NURSING
Received report from ED nurse. 25 year old G 2 P 0 at 30 wks and 2 days with c/o lower abdominal pain and vaginal bleeding that started today at 1300 after falling from 3 steps of stairs  And landing on her back at 1100 am this morning.   History of a miscarriage at 4 weeks per pt. Pt states having bright red vaginal bleeding, no leaking fluid. Fetal heart tones 145 bpm, + fetal movements, 2 contractions. Abdomen soft and tender to touch. Vital signs stable. Cervix unchecked. Educated on electronic fetal monitoring and assessments. Questions answered. Will update Dr. Douglas.

## 2020-04-09 NOTE — NURSING
Patient arrived to the unit from ER with complaints of abominal pain. Patient is a 25 year old  at 30 weeks and 2 days. Patient placed in triage 3 for evaluation.

## 2020-04-10 NOTE — NURSING
Pt lying in bed, pt talking on phone, BBS clear, BS active abd soft, pt grimacing and guards right lower abd.EFM on FHT's140's, no contractions noted. No swelling noted. Used  MARTII to translate pt speaks Serbian only, Assessment done and  discussed  POC.Pt states understanding, questions answered. Offered BR, pt denied, FFN collected, SVE  Done, closed,  no bleeding noted. Pt states took two Tylenols at 3pm.Will continue to monitor. Latonia ARCE

## 2020-04-10 NOTE — NURSING
BASILIOE done no change, Discharge instructions, Kick counts,  Labor precautions given using interrupter/MARTTI. All questions answered. Pt changing and will be discharged family called. Latonia ARCE

## 2020-04-10 NOTE — NURSING
Spoke with Dr Douglas update given on pt. Will await US report. Will give Tylenol 650 mg as ordered and Water. Latonia ARCE

## 2020-04-13 ENCOUNTER — TELEPHONE (OUTPATIENT)
Dept: OBSTETRICS AND GYNECOLOGY | Facility: CLINIC | Age: 25
End: 2020-04-13

## 2020-04-13 NOTE — TELEPHONE ENCOUNTER
----- Message from Larisa SCHROEDERYonny Galvez sent at 4/13/2020 10:20 AM CDT -----  Contact: [phone] self - [place]  Pt is requesting to speak with you re: Diabetic educational class. Pt states that she has not received a call from them to schedule class. Please advise

## 2020-04-13 NOTE — TELEPHONE ENCOUNTER
Patient called stating that she has not received a call from diabetic management yet for the educational classes. I gave patient their number so that she can get in contact with them personally to make the appointment. I also called them and left Alexis a message to call patient and schedule that appointment. I informed patient that I will forward message to them. Patient verbalized understanding.

## 2020-04-14 NOTE — PROGRESS NOTES
No complaints today   No nausea or vomiting   Denies vaginal bleeding or cramping     Prenatal labs reviewed  Aneuploidy screen  = Quad today   Anatomy   US at 20 weeks incomplete, will need @30w   Consents= signed   Diabetes screen= abnormal and abnormal 3hr GTT   Growth US=   TdaP= given   GBBS=  BC=     General Pregnancy  recommendations given  PNV + H2O intake    Did not go to Diabetes education   Not following the diet yet   I encouraged to start doing that and set the appointment with DE so she can start blood sugar patterning         Roland Bryant M.D.   OB/GYN

## 2020-04-23 ENCOUNTER — CLINICAL SUPPORT (OUTPATIENT)
Dept: DIABETES | Facility: CLINIC | Age: 25
End: 2020-04-23
Payer: MEDICAID

## 2020-04-23 ENCOUNTER — ROUTINE PRENATAL (OUTPATIENT)
Dept: OBSTETRICS AND GYNECOLOGY | Facility: CLINIC | Age: 25
End: 2020-04-23
Payer: MEDICAID

## 2020-04-23 VITALS
WEIGHT: 164.69 LBS | DIASTOLIC BLOOD PRESSURE: 72 MMHG | SYSTOLIC BLOOD PRESSURE: 108 MMHG | BODY MASS INDEX: 29.17 KG/M2

## 2020-04-23 VITALS — WEIGHT: 163 LBS | BODY MASS INDEX: 28.87 KG/M2

## 2020-04-23 DIAGNOSIS — Z34.83 ENCOUNTER FOR SUPERVISION OF OTHER NORMAL PREGNANCY IN THIRD TRIMESTER: ICD-10-CM

## 2020-04-23 DIAGNOSIS — Z3A.32 32 WEEKS GESTATION OF PREGNANCY: Primary | ICD-10-CM

## 2020-04-23 DIAGNOSIS — O24.410 DIET CONTROLLED GESTATIONAL DIABETES MELLITUS (GDM) IN THIRD TRIMESTER: ICD-10-CM

## 2020-04-23 DIAGNOSIS — Z3A.28 28 WEEKS GESTATION OF PREGNANCY: ICD-10-CM

## 2020-04-23 PROCEDURE — 99999 PR PBB SHADOW E&M-EST. PATIENT-LVL II: ICD-10-PCS | Mod: PBBFAC,,,

## 2020-04-23 PROCEDURE — G0108 DIAB MANAGE TRN  PER INDIV: HCPCS | Mod: PBBFAC,PO | Performed by: REGISTERED NURSE

## 2020-04-23 PROCEDURE — 99999 PR PBB SHADOW E&M-EST. PATIENT-LVL II: ICD-10-PCS | Mod: PBBFAC,,, | Performed by: OBSTETRICS & GYNECOLOGY

## 2020-04-23 PROCEDURE — 99213 PR OFFICE/OUTPT VISIT, EST, LEVL III, 20-29 MIN: ICD-10-PCS | Mod: S$PBB,TH,, | Performed by: OBSTETRICS & GYNECOLOGY

## 2020-04-23 PROCEDURE — 99999 PR PBB SHADOW E&M-EST. PATIENT-LVL II: CPT | Mod: PBBFAC,,,

## 2020-04-23 PROCEDURE — 99212 OFFICE O/P EST SF 10 MIN: CPT | Mod: PBBFAC,TH,PO | Performed by: OBSTETRICS & GYNECOLOGY

## 2020-04-23 PROCEDURE — 99212 OFFICE O/P EST SF 10 MIN: CPT | Mod: PBBFAC,27,PO | Performed by: REGISTERED NURSE

## 2020-04-23 PROCEDURE — 99999 PR PBB SHADOW E&M-EST. PATIENT-LVL II: CPT | Mod: PBBFAC,,, | Performed by: OBSTETRICS & GYNECOLOGY

## 2020-04-23 PROCEDURE — 99213 OFFICE O/P EST LOW 20 MIN: CPT | Mod: S$PBB,TH,, | Performed by: OBSTETRICS & GYNECOLOGY

## 2020-04-23 RX ORDER — DEXTROSE 4 G
TABLET,CHEWABLE ORAL
Qty: 1 EACH | Refills: 0 | Status: ON HOLD | OUTPATIENT
Start: 2020-04-23 | End: 2023-01-13 | Stop reason: HOSPADM

## 2020-04-23 RX ORDER — LANCETS 33 GAUGE
1 EACH MISCELLANEOUS 4 TIMES DAILY
Qty: 100 EACH | Refills: 3 | Status: ON HOLD | OUTPATIENT
Start: 2020-04-23 | End: 2023-01-13 | Stop reason: HOSPADM

## 2020-04-23 NOTE — PROGRESS NOTES
No complaints today   No nausea or vomiting   Denies vaginal bleeding or cramping     Prenatal labs reviewed  Aneuploidy screen  = Quad today   Anatomy   US at 20 weeks incomplete, will need @30w   Consents= signed   Diabetes screen= abnormal and abnormal 3hr GTT   Growth US=   TdaP= given   GBBS=  BC=     General Pregnancy  recommendations given  PNV + H2O intake    Did not go to Diabetes education   Not following the diet yet   I encouraged to start doing that and set the appointment with DE so she can start blood sugar patterning     Appointment with Diabetes education today 1200pm     Roland Bryant M.D.   OB/GYN

## 2020-04-23 NOTE — PROGRESS NOTES
Telephone - [id number] ([phone])    Diabetes Care Specialist Telehealth Visit Note  This visit was conducted using Telehealth/Telephonic Technology. It was in the patient's best interest to receive diabetes self-management education and support services in this format to prevent the exposure to COVID-19. Diabetes Education  Author: Alexis Hilton RN  Date: 4/23/2020  Diabetes Care Management Summary  Diabetes Education Record Assessment/Progress: Initial  Current Diabetes Risk Level: Low   Diabetes Type  Diabetes Type : Gestational  Diabetes History  Diabetes Diagnosis: 0-1 year  Current Treatment: Diet, Exercise  Reviewed Problem List with Patient: No  Health Maintenance was reviewed today with patient. Discussed with patient importance of routine eye exams, foot exams/foot care, blood work (i.e.: A1c, microalbumin, and lipid), dental visits, yearly flu vaccine, and pneumonia vaccine as indicated by PCP. Patient verbalized understanding.     Health Maintenance Topics with due status: Not Due       Topic Last Completion Date    Pap Smear 10/16/2019    TETANUS VACCINE 03/25/2020     Health Maintenance Due   Topic Date Due    Lipid Panel  1995   Nutrition  Meal Planning: water, 3 meals per day  What type of beverages do you drink?: juice, water  Meal Plan 24 Hour Recall - Breakfast: salad, shrimp  Meal Plan 24 Hour Recall - Lunch: cheese, 2 tortillas,beans, sour cream  Meal Plan 24 Hour Recall - Dinner: chicken soup with tortillas  Monitoring   Self Monitoring : pt was instructed to contact md to get a true metrix meter and supplies ordered. pt was instructed to test 4 times day- fasting in am and 2 hours after breakfast, lunch and dinner. Instructed pt on the normal bs ranges. Instructed pt to keep a record of er bs's and to bring the record to her md visits.  Blood Glucose Logs: No  Do you use a personal continuous glucose monitor?: No  In the last month, how often have you had a low blood sugar  reaction?: once  What are your symptoms of low blood sugar?: not very often,, shaky, head ache, weak, dizzy  How do you treat low blood sugar?: did not treat- unaware it was a low bs.  Can you tell when your blood sugar is too high?: no  Exercise   Exercise Type: none  Current Diabetes Treatment   Current Treatment: Diet, Exercise  Social History  Primary Support: Self    DDS-2 Score  ( > 3 = SIGNIFICANT DISTRESS): 2     Barriers to Change  Barriers to Change: None  Learning Challenges : Language  Language spoken: Kosovan  Readiness to Learn   Readiness to Learn : Acceptance  Cultural Influences  Cultural Influences: None  Diabetes Education Assessment/Progress  Diabetes Disease Process (diabetes disease process and treatment options): Discussion, Instructed, Demonstrates Understanding/Competency(verbalizes/demonstrates), Individual Session  Nutrition (Incorporating nutritional management into one's lifestyle): Discussion, Instructed, Demonstrates Understanding/Competency (verbalizes/demonstrates), Individual Session  Physical Activity (incorporating physical activity into one's lifestyle): Discussion, Instructed, Demonstrates Understanding/Competency (verbalizes/demonstrates), Individual Session  Medications (states correct name, dose, onset, peak, duration, side effects & timing of meds): Not Applicable  Monitoring (monitoring blood glucose/other parameters & using results): Discussion, Instructed, Demonstrates Understanding/Competency (verbalizes/demonstrates), Individual Session  Acute Complications (preventing, detecting, and treating acute complications): Not Covered/Deferred  Chronic Complications (preventing, detecting, and treating chronic complications): Not Covered/Deferred  Clinical (diabetes, other pertinent medical history, and relevant comorbidities reviewed during visit): Discussion, Instructed, Demonstrates Understanding/Competency (verbalizes/demonstrates), Individual Session  Cognitive (knowledge  of self-management skills, functional health literacy): Discussion, Instructed, Demonstrates Understanding/Competency (verbalizes/demonstrates), Individual Session  Psychosocial (emotional response to diabetes): Discussion, Instructed, Demonstrates Understanding/Competency (verbalizes/demonstrates), Individual Session  Diabetes Distress and Support Systems: Discussion, Instructed, Demonstrates Understanding/Competency (verbalizes/demonstrates), Individual Session  Behavioral (readiness for change, lifestyle practices, self-care behaviors): Discussion, Instructed, Demonstrates Understanding/Competency (verbalizes/demonstrates), Individual Session  Goals  Patient has selected/evaluated goals during today's session: Yes, selected  Healthy Eating: Set  Start Date: 04/23/20  Target Date: 06/23/20  Monitoring: Set  Start Date: 04/23/20  Target Date: 06/23/20  Diabetes Meal Plan  Restrictions: Restricted Carbohydrate  Calories: 2000  Carbohydrate Per Meal: 45-60g  Carbohydrate Per Snack : 15-30g  Pts gdm iinstructions completed via phone with the assistance of an . Instructed pt on the food groups, how to read labels and count carbs. Instructed on the meal plan with 3 meals and 3 snacks per day. Instructed pt on how to put her meals and snacks together. Discussed with pt the importance of eating all of the food for her meals. Discussed with pt foods that she likes that she could include in her meal plan. Discussed with pt the importance of measuring. Pt took notes on instructions. Pt states that she did not have any questions and understood what we discussed. Pt will fu with md.Pt was advised to call for any problems or questions.  Today's Self-Management Care Plan was developed with the patient's input and is based on barriers identified during today's assessment.    The long and short-term goals in the care plan were written with the patient/caregiver's input. The patient has agreed to work toward these goals  to improve her overall diabetes control.      The patient received a copy of today's self-management plan and verbalized understanding of the care plan, goals, and all of today's instructions.      The patient was encouraged to communicate with her physician and care team regarding her condition(s) and treatment.  I provided the patient with my contact information today and encouraged her to contact me via phone or patient portal as needed.     Education Units of Time   Time Spent: 90 min

## 2020-05-08 ENCOUNTER — ROUTINE PRENATAL (OUTPATIENT)
Dept: OBSTETRICS AND GYNECOLOGY | Facility: CLINIC | Age: 25
End: 2020-05-08
Payer: MEDICAID

## 2020-05-08 VITALS
SYSTOLIC BLOOD PRESSURE: 108 MMHG | BODY MASS INDEX: 29.17 KG/M2 | DIASTOLIC BLOOD PRESSURE: 62 MMHG | WEIGHT: 164.69 LBS

## 2020-05-08 DIAGNOSIS — O24.410 DIET CONTROLLED GESTATIONAL DIABETES MELLITUS (GDM) IN THIRD TRIMESTER: ICD-10-CM

## 2020-05-08 DIAGNOSIS — Z3A.34 34 WEEKS GESTATION OF PREGNANCY: ICD-10-CM

## 2020-05-08 DIAGNOSIS — Z34.83 ENCOUNTER FOR SUPERVISION OF OTHER NORMAL PREGNANCY IN THIRD TRIMESTER: ICD-10-CM

## 2020-05-08 DIAGNOSIS — Z3A.36 36 WEEKS GESTATION OF PREGNANCY: Primary | ICD-10-CM

## 2020-05-08 PROCEDURE — 99214 OFFICE O/P EST MOD 30 MIN: CPT | Mod: S$PBB,TH,, | Performed by: OBSTETRICS & GYNECOLOGY

## 2020-05-08 PROCEDURE — 99999 PR PBB SHADOW E&M-EST. PATIENT-LVL III: CPT | Mod: PBBFAC,,, | Performed by: OBSTETRICS & GYNECOLOGY

## 2020-05-08 PROCEDURE — 99214 PR OFFICE/OUTPT VISIT, EST, LEVL IV, 30-39 MIN: ICD-10-PCS | Mod: S$PBB,TH,, | Performed by: OBSTETRICS & GYNECOLOGY

## 2020-05-08 PROCEDURE — 99213 OFFICE O/P EST LOW 20 MIN: CPT | Mod: PBBFAC,TH,PO | Performed by: OBSTETRICS & GYNECOLOGY

## 2020-05-08 PROCEDURE — 99999 PR PBB SHADOW E&M-EST. PATIENT-LVL III: ICD-10-PCS | Mod: PBBFAC,,, | Performed by: OBSTETRICS & GYNECOLOGY

## 2020-05-15 ENCOUNTER — TELEPHONE (OUTPATIENT)
Dept: OBSTETRICS AND GYNECOLOGY | Facility: CLINIC | Age: 25
End: 2020-05-15

## 2020-05-15 DIAGNOSIS — Z3A.36 36 WEEKS GESTATION OF PREGNANCY: Primary | ICD-10-CM

## 2020-05-21 ENCOUNTER — PROCEDURE VISIT (OUTPATIENT)
Dept: OBSTETRICS AND GYNECOLOGY | Facility: CLINIC | Age: 25
End: 2020-05-21
Payer: MEDICAID

## 2020-05-21 ENCOUNTER — ROUTINE PRENATAL (OUTPATIENT)
Dept: OBSTETRICS AND GYNECOLOGY | Facility: CLINIC | Age: 25
End: 2020-05-21
Payer: MEDICAID

## 2020-05-21 VITALS — BODY MASS INDEX: 30.11 KG/M2 | DIASTOLIC BLOOD PRESSURE: 67 MMHG | WEIGHT: 170 LBS | SYSTOLIC BLOOD PRESSURE: 108 MMHG

## 2020-05-21 DIAGNOSIS — O24.414 INSULIN CONTROLLED GESTATIONAL DIABETES MELLITUS (GDM) IN FIRST TRIMESTER: ICD-10-CM

## 2020-05-21 DIAGNOSIS — Z3A.36 36 WEEKS GESTATION OF PREGNANCY: Primary | ICD-10-CM

## 2020-05-21 DIAGNOSIS — Z3A.36 36 WEEKS GESTATION OF PREGNANCY: ICD-10-CM

## 2020-05-21 PROCEDURE — 99214 OFFICE O/P EST MOD 30 MIN: CPT | Mod: S$PBB,TH,, | Performed by: OBSTETRICS & GYNECOLOGY

## 2020-05-21 PROCEDURE — 87081 CULTURE SCREEN ONLY: CPT

## 2020-05-21 PROCEDURE — 99499 UNLISTED E&M SERVICE: CPT | Mod: S$PBB,,, | Performed by: PEDIATRICS

## 2020-05-21 PROCEDURE — 76819 PR US, OB, FETAL BIOPHYSICAL, W/O NST: ICD-10-PCS | Mod: 26,S$PBB,, | Performed by: PEDIATRICS

## 2020-05-21 PROCEDURE — 99999 PR PBB SHADOW E&M-EST. PATIENT-LVL III: ICD-10-PCS | Mod: PBBFAC,,, | Performed by: OBSTETRICS & GYNECOLOGY

## 2020-05-21 PROCEDURE — 76819 FETAL BIOPHYS PROFIL W/O NST: CPT | Mod: PBBFAC,PO | Performed by: PEDIATRICS

## 2020-05-21 PROCEDURE — 99213 OFFICE O/P EST LOW 20 MIN: CPT | Mod: PBBFAC,TH,PO,25 | Performed by: OBSTETRICS & GYNECOLOGY

## 2020-05-21 PROCEDURE — 99214 PR OFFICE/OUTPT VISIT, EST, LEVL IV, 30-39 MIN: ICD-10-PCS | Mod: S$PBB,TH,, | Performed by: OBSTETRICS & GYNECOLOGY

## 2020-05-21 PROCEDURE — 76819 FETAL BIOPHYS PROFIL W/O NST: CPT | Mod: 26,S$PBB,, | Performed by: PEDIATRICS

## 2020-05-21 PROCEDURE — 76816 OB US FOLLOW-UP PER FETUS: CPT | Mod: 26,S$PBB,, | Performed by: PEDIATRICS

## 2020-05-21 PROCEDURE — 99499 NO LOS: ICD-10-PCS | Mod: S$PBB,,, | Performed by: PEDIATRICS

## 2020-05-21 PROCEDURE — 76816 PR  US,PREGNANT UTERUS,F/U,TRANSABD APP: ICD-10-PCS | Mod: 26,S$PBB,, | Performed by: PEDIATRICS

## 2020-05-21 PROCEDURE — 99999 PR PBB SHADOW E&M-EST. PATIENT-LVL III: CPT | Mod: PBBFAC,,, | Performed by: OBSTETRICS & GYNECOLOGY

## 2020-05-21 PROCEDURE — 76816 OB US FOLLOW-UP PER FETUS: CPT | Mod: PBBFAC,PO | Performed by: PEDIATRICS

## 2020-05-21 NOTE — PROGRESS NOTES
No complaints today   No nausea or vomiting   Denies vaginal bleeding or cramping     Prenatal labs reviewed  Aneuploidy screen  = Quad today   Anatomy   US at 20 weeks incomplete, will need @30w   Consents= signed   Diabetes screen= abnormal and abnormal 3hr GTT   Growth US=   TdaP= given   GBBS=  BC=     General Pregnancy  recommendations given  PNV + H2O intake    Did not go to Diabetes education   Started on her diet a few days ago  No doing the BS patterning the right way   ( it was virtual visit , and information got mixed up )   Will start doing it properly ,  fasting and 2 hrs after meals       Roland Bryant M.D.   OB/GYN

## 2020-05-21 NOTE — PROGRESS NOTES
No complaints today   No nausea or vomiting   Denies vaginal bleeding or cramping     Prenatal labs reviewed  Aneuploidy screen  = Quad today   Anatomy   US at 20 weeks incomplete, will need @30w   Consents= signed   Diabetes screen= abnormal and abnormal 3hr GTT   Growth US=   TdaP= given   GBBS=  BC=     General Pregnancy  recommendations given  PNV + H2O intake    Did not go to Diabetes education   Started on her diet a few days ago  No doing the BS patterning the right way   ( it was virtual visit , and information got mixed up )     Brings today BS log  fasting > 50  % values on target . PP=> 50 %  On target with diet alone      Fu in one week     Roland Bryant M.D.   OB/GYN

## 2020-05-26 LAB — BACTERIA SPEC AEROBE CULT: NORMAL

## 2020-05-27 ENCOUNTER — ROUTINE PRENATAL (OUTPATIENT)
Dept: OBSTETRICS AND GYNECOLOGY | Facility: CLINIC | Age: 25
End: 2020-05-27
Payer: MEDICAID

## 2020-05-27 VITALS
DIASTOLIC BLOOD PRESSURE: 65 MMHG | BODY MASS INDEX: 30.15 KG/M2 | SYSTOLIC BLOOD PRESSURE: 110 MMHG | WEIGHT: 170.19 LBS

## 2020-05-27 DIAGNOSIS — O24.410 DIET CONTROLLED GESTATIONAL DIABETES MELLITUS (GDM) IN THIRD TRIMESTER: ICD-10-CM

## 2020-05-27 DIAGNOSIS — Z34.83 ENCOUNTER FOR SUPERVISION OF OTHER NORMAL PREGNANCY IN THIRD TRIMESTER: ICD-10-CM

## 2020-05-27 DIAGNOSIS — Z3A.37 37 WEEKS GESTATION OF PREGNANCY: Primary | ICD-10-CM

## 2020-05-27 PROCEDURE — 99214 PR OFFICE/OUTPT VISIT, EST, LEVL IV, 30-39 MIN: ICD-10-PCS | Mod: S$PBB,TH,, | Performed by: OBSTETRICS & GYNECOLOGY

## 2020-05-27 PROCEDURE — 99999 PR PBB SHADOW E&M-EST. PATIENT-LVL III: CPT | Mod: PBBFAC,,, | Performed by: OBSTETRICS & GYNECOLOGY

## 2020-05-27 PROCEDURE — 99999 PR PBB SHADOW E&M-EST. PATIENT-LVL III: ICD-10-PCS | Mod: PBBFAC,,, | Performed by: OBSTETRICS & GYNECOLOGY

## 2020-05-27 PROCEDURE — 99213 OFFICE O/P EST LOW 20 MIN: CPT | Mod: PBBFAC,TH,PO | Performed by: OBSTETRICS & GYNECOLOGY

## 2020-05-27 PROCEDURE — 99214 OFFICE O/P EST MOD 30 MIN: CPT | Mod: S$PBB,TH,, | Performed by: OBSTETRICS & GYNECOLOGY

## 2020-05-27 NOTE — PROGRESS NOTES
"No complaints today   No nausea or vomiting   Denies vaginal bleeding or cramping     Prenatal labs reviewed  Aneuploidy screen  = Quad today   Anatomy   US at 20 weeks incomplete, will need @30w   Consents= signed   Diabetes screen= abnormal and abnormal 3hr GTT   Growth US=   TdaP= given   GBBS=  BC=     General Pregnancy  recommendations given  PNV + H2O intake    Following the diet and checking Blood sugars but did not bring BS log   Patient refers are " on target"   But I insist that should bring the log     IOL  6/9/20     Roland Bryant M.D.   OB/GYN      "

## 2020-06-03 ENCOUNTER — TELEPHONE (OUTPATIENT)
Dept: OBSTETRICS AND GYNECOLOGY | Facility: CLINIC | Age: 25
End: 2020-06-03

## 2020-06-03 ENCOUNTER — ROUTINE PRENATAL (OUTPATIENT)
Dept: OBSTETRICS AND GYNECOLOGY | Facility: CLINIC | Age: 25
End: 2020-06-03
Payer: MEDICAID

## 2020-06-03 VITALS
SYSTOLIC BLOOD PRESSURE: 120 MMHG | DIASTOLIC BLOOD PRESSURE: 70 MMHG | BODY MASS INDEX: 30.42 KG/M2 | WEIGHT: 171.75 LBS

## 2020-06-03 DIAGNOSIS — Z3A.39 39 WEEKS GESTATION OF PREGNANCY: Primary | ICD-10-CM

## 2020-06-03 PROCEDURE — 99213 PR OFFICE/OUTPT VISIT, EST, LEVL III, 20-29 MIN: ICD-10-PCS | Mod: S$PBB,TH,, | Performed by: OBSTETRICS & GYNECOLOGY

## 2020-06-03 PROCEDURE — 99213 OFFICE O/P EST LOW 20 MIN: CPT | Mod: S$PBB,TH,, | Performed by: OBSTETRICS & GYNECOLOGY

## 2020-06-03 PROCEDURE — 99999 PR PBB SHADOW E&M-EST. PATIENT-LVL III: ICD-10-PCS | Mod: PBBFAC,,, | Performed by: OBSTETRICS & GYNECOLOGY

## 2020-06-03 PROCEDURE — 99999 PR PBB SHADOW E&M-EST. PATIENT-LVL III: CPT | Mod: PBBFAC,,, | Performed by: OBSTETRICS & GYNECOLOGY

## 2020-06-03 PROCEDURE — 99213 OFFICE O/P EST LOW 20 MIN: CPT | Mod: PBBFAC,PO | Performed by: OBSTETRICS & GYNECOLOGY

## 2020-06-03 RX ORDER — SIMETHICONE 80 MG
1 TABLET,CHEWABLE ORAL 4 TIMES DAILY PRN
Status: CANCELLED | OUTPATIENT
Start: 2020-06-03

## 2020-06-03 RX ORDER — SODIUM CHLORIDE 9 MG/ML
INJECTION, SOLUTION INTRAVENOUS CONTINUOUS
Status: CANCELLED | OUTPATIENT
Start: 2020-06-03

## 2020-06-03 RX ORDER — MISOPROSTOL 100 MCG
50 TABLET ORAL EVERY 4 HOURS
Status: CANCELLED | OUTPATIENT
Start: 2020-06-03 | End: 2020-06-04

## 2020-06-03 RX ORDER — OXYTOCIN/RINGER'S LACTATE 30/500 ML
334 PLASTIC BAG, INJECTION (ML) INTRAVENOUS ONCE
Status: CANCELLED | OUTPATIENT
Start: 2020-06-03 | End: 2020-06-03

## 2020-06-03 RX ORDER — METHYLERGONOVINE MALEATE 0.2 MG/ML
200 INJECTION INTRAVENOUS
Status: CANCELLED | OUTPATIENT
Start: 2020-06-03

## 2020-06-03 RX ORDER — OXYTOCIN/RINGER'S LACTATE 30/500 ML
95 PLASTIC BAG, INJECTION (ML) INTRAVENOUS ONCE
Status: CANCELLED | OUTPATIENT
Start: 2020-06-03 | End: 2020-06-03

## 2020-06-03 RX ORDER — CARBOPROST TROMETHAMINE 250 UG/ML
250 INJECTION, SOLUTION INTRAMUSCULAR
Status: CANCELLED | OUTPATIENT
Start: 2020-06-03

## 2020-06-03 RX ORDER — TERBUTALINE SULFATE 1 MG/ML
0.25 INJECTION SUBCUTANEOUS
Status: CANCELLED | OUTPATIENT
Start: 2020-06-03

## 2020-06-03 RX ORDER — CALCIUM CARBONATE 200(500)MG
500 TABLET,CHEWABLE ORAL 3 TIMES DAILY PRN
Status: CANCELLED | OUTPATIENT
Start: 2020-06-03

## 2020-06-03 RX ORDER — ONDANSETRON 4 MG/1
8 TABLET, ORALLY DISINTEGRATING ORAL EVERY 8 HOURS PRN
Status: CANCELLED | OUTPATIENT
Start: 2020-06-03

## 2020-06-03 RX ORDER — OXYTOCIN/RINGER'S LACTATE 30/500 ML
2 PLASTIC BAG, INJECTION (ML) INTRAVENOUS CONTINUOUS
Status: CANCELLED | OUTPATIENT
Start: 2020-06-03

## 2020-06-03 RX ORDER — MISOPROSTOL 100 UG/1
800 TABLET ORAL
Status: CANCELLED | OUTPATIENT
Start: 2020-06-03

## 2020-06-03 RX ORDER — SODIUM CHLORIDE, SODIUM LACTATE, POTASSIUM CHLORIDE, CALCIUM CHLORIDE 600; 310; 30; 20 MG/100ML; MG/100ML; MG/100ML; MG/100ML
INJECTION, SOLUTION INTRAVENOUS CONTINUOUS
Status: CANCELLED | OUTPATIENT
Start: 2020-06-03

## 2020-06-03 RX ORDER — SODIUM CHLORIDE 9 MG/ML
INJECTION, SOLUTION INTRAVENOUS
Status: CANCELLED | OUTPATIENT
Start: 2020-06-03

## 2020-06-03 NOTE — PROGRESS NOTES
No complaints today   No nausea or vomiting   Denies vaginal bleeding or cramping     Prenatal labs reviewed  Aneuploidy screen  = Quad today   Anatomy   US at 20 weeks incomplete, will need @30w   Consents= signed   Diabetes screen= abnormal and abnormal 3hr GTT   Growth US= done  TdaP= given   GBBS= neg  BC=     General Pregnancy  recommendations given  PNV + H2O intake    BS log-   Fasting = most on target only a few outliers up yo 120  PP= most on target , 110 to 165     IOL  6/9/20     Roland Bryant M.D.   OB/GYN

## 2020-06-04 ENCOUNTER — HOSPITAL ENCOUNTER (OUTPATIENT)
Facility: HOSPITAL | Age: 25
Discharge: HOME | End: 2020-06-04
Attending: OBSTETRICS & GYNECOLOGY | Admitting: OBSTETRICS & GYNECOLOGY
Payer: MEDICAID

## 2020-06-04 DIAGNOSIS — O36.8190 DECREASED FETAL MOVEMENT: ICD-10-CM

## 2020-06-04 PROCEDURE — 59025 FETAL NON-STRESS TEST: CPT

## 2020-06-04 PROCEDURE — 99211 OFF/OP EST MAY X REQ PHY/QHP: CPT | Mod: 25

## 2020-06-04 NOTE — TELEPHONE ENCOUNTER
Patient is complaining of baby not moving and hard stomach. Patient is advised to go to L&D. Patient verbalized understanding.

## 2020-06-04 NOTE — TELEPHONE ENCOUNTER
----- Message from Astrid Velasco MA sent at 6/4/2020 10:23 AM CDT -----  Contact: Self [phone]      ----- Message -----  From: Vibha Marsh  Sent: 6/4/2020  10:11 AM CDT  To: Manny Watkins Staff    Patient would like to speak with you about the baby not moving and her stomach is hard. Please advise

## 2020-06-04 NOTE — DISCHARGE INSTRUCTIONS
Patient instructed to return to the unit for any further problems. Instructed to return immediately for any sudden gushes of water or bleeding. Instructed to return for contractions causing pain. Instructed to drink plenty of water and to rest as needed. Patient instructed to keep scheduled appointment with Dr. Bryant.

## 2020-06-04 NOTE — NURSING
Patient ambulated to ER entrance to be discharged home. Patient feels much relief knowing that the baby looked great on the monitor

## 2020-06-04 NOTE — NURSING
Patient arrived from the ER with complaints of decreased fetal movement. Patient is 38 weeks and 2 days pregnant. Placed in triage 1 for evaluation, fetal monitoring initiated.

## 2020-06-04 NOTE — NURSING
Orders received from Dr. Bryant for patient to be discharged home and keep scheduled appointment with him.

## 2020-06-08 ENCOUNTER — HOSPITAL ENCOUNTER (INPATIENT)
Facility: HOSPITAL | Age: 25
LOS: 3 days | Discharge: HOME | End: 2020-06-11
Attending: OBSTETRICS & GYNECOLOGY | Admitting: OBSTETRICS & GYNECOLOGY
Payer: MEDICAID

## 2020-06-08 ENCOUNTER — ANESTHESIA EVENT (OUTPATIENT)
Dept: OBSTETRICS AND GYNECOLOGY | Facility: HOSPITAL | Age: 25
End: 2020-06-08
Payer: MEDICAID

## 2020-06-08 ENCOUNTER — ANESTHESIA (OUTPATIENT)
Dept: OBSTETRICS AND GYNECOLOGY | Facility: HOSPITAL | Age: 25
End: 2020-06-08
Payer: MEDICAID

## 2020-06-08 ENCOUNTER — ROUTINE PRENATAL (OUTPATIENT)
Dept: OBSTETRICS AND GYNECOLOGY | Facility: CLINIC | Age: 25
End: 2020-06-08
Payer: MEDICAID

## 2020-06-08 VITALS
DIASTOLIC BLOOD PRESSURE: 60 MMHG | SYSTOLIC BLOOD PRESSURE: 124 MMHG | WEIGHT: 173.75 LBS | BODY MASS INDEX: 30.77 KG/M2

## 2020-06-08 DIAGNOSIS — Z3A.39 39 WEEKS GESTATION OF PREGNANCY: ICD-10-CM

## 2020-06-08 DIAGNOSIS — Z3A.38 38 WEEKS GESTATION OF PREGNANCY: ICD-10-CM

## 2020-06-08 DIAGNOSIS — Z30.014 ENCOUNTER FOR INITIAL PRESCRIPTION OF INTRAUTERINE CONTRACEPTIVE DEVICE (IUD): Primary | ICD-10-CM

## 2020-06-08 DIAGNOSIS — D64.9 CHRONIC ANEMIA: ICD-10-CM

## 2020-06-08 DIAGNOSIS — O24.410 DIET CONTROLLED GESTATIONAL DIABETES MELLITUS (GDM) IN THIRD TRIMESTER: ICD-10-CM

## 2020-06-08 LAB
ABO + RH BLD: NORMAL
ALBUMIN SERPL BCP-MCNC: 2.8 G/DL (ref 3.5–5.2)
ALP SERPL-CCNC: 154 U/L (ref 55–135)
ALT SERPL W/O P-5'-P-CCNC: 11 U/L (ref 10–44)
ANION GAP SERPL CALC-SCNC: 7 MMOL/L (ref 8–16)
AST SERPL-CCNC: 11 U/L (ref 10–40)
BASOPHILS # BLD AUTO: 0.02 K/UL (ref 0–0.2)
BASOPHILS NFR BLD: 0.2 % (ref 0–1.9)
BILIRUB SERPL-MCNC: 0.3 MG/DL (ref 0.1–1)
BLD GP AB SCN CELLS X3 SERPL QL: NORMAL
BUN SERPL-MCNC: 5 MG/DL (ref 6–20)
CALCIUM SERPL-MCNC: 8.9 MG/DL (ref 8.7–10.5)
CHLORIDE SERPL-SCNC: 108 MMOL/L (ref 95–110)
CO2 SERPL-SCNC: 23 MMOL/L (ref 23–29)
CREAT SERPL-MCNC: 0.6 MG/DL (ref 0.5–1.4)
DIFFERENTIAL METHOD: ABNORMAL
EOSINOPHIL # BLD AUTO: 0.1 K/UL (ref 0–0.5)
EOSINOPHIL NFR BLD: 0.9 % (ref 0–8)
ERYTHROCYTE [DISTWIDTH] IN BLOOD BY AUTOMATED COUNT: 14.1 % (ref 11.5–14.5)
EST. GFR  (AFRICAN AMERICAN): >60 ML/MIN/1.73 M^2
EST. GFR  (NON AFRICAN AMERICAN): >60 ML/MIN/1.73 M^2
GLUCOSE SERPL-MCNC: 94 MG/DL (ref 70–110)
HCT VFR BLD AUTO: 35.7 % (ref 37–48.5)
HGB BLD-MCNC: 11.7 G/DL (ref 12–16)
HIV1+2 IGG SERPL QL IA.RAPID: NORMAL
IMM GRANULOCYTES # BLD AUTO: 0.06 K/UL (ref 0–0.04)
IMM GRANULOCYTES NFR BLD AUTO: 0.7 % (ref 0–0.5)
LYMPHOCYTES # BLD AUTO: 2.3 K/UL (ref 1–4.8)
LYMPHOCYTES NFR BLD: 28.3 % (ref 18–48)
MCH RBC QN AUTO: 29.7 PG (ref 27–31)
MCHC RBC AUTO-ENTMCNC: 32.8 G/DL (ref 32–36)
MCV RBC AUTO: 91 FL (ref 82–98)
MONOCYTES # BLD AUTO: 0.6 K/UL (ref 0.3–1)
MONOCYTES NFR BLD: 7.5 % (ref 4–15)
NEUTROPHILS # BLD AUTO: 5.1 K/UL (ref 1.8–7.7)
NEUTROPHILS NFR BLD: 62.4 % (ref 38–73)
NRBC BLD-RTO: 0 /100 WBC
PLATELET # BLD AUTO: 205 K/UL (ref 150–350)
PMV BLD AUTO: 10.5 FL (ref 9.2–12.9)
POTASSIUM SERPL-SCNC: 3.8 MMOL/L (ref 3.5–5.1)
PROT SERPL-MCNC: 6.3 G/DL (ref 6–8.4)
RBC # BLD AUTO: 3.94 M/UL (ref 4–5.4)
SARS-COV-2 RDRP RESP QL NAA+PROBE: NEGATIVE
SODIUM SERPL-SCNC: 138 MMOL/L (ref 136–145)
WBC # BLD AUTO: 8.12 K/UL (ref 3.9–12.7)

## 2020-06-08 PROCEDURE — 36415 COLL VENOUS BLD VENIPUNCTURE: CPT

## 2020-06-08 PROCEDURE — 99213 OFFICE O/P EST LOW 20 MIN: CPT | Mod: PBBFAC,TH,PO,25 | Performed by: OBSTETRICS & GYNECOLOGY

## 2020-06-08 PROCEDURE — 86850 RBC ANTIBODY SCREEN: CPT

## 2020-06-08 PROCEDURE — 99999 PR PBB SHADOW E&M-EST. PATIENT-LVL III: ICD-10-PCS | Mod: PBBFAC,,, | Performed by: OBSTETRICS & GYNECOLOGY

## 2020-06-08 PROCEDURE — 86703 HIV-1/HIV-2 1 RESULT ANTBDY: CPT

## 2020-06-08 PROCEDURE — 80053 COMPREHEN METABOLIC PANEL: CPT

## 2020-06-08 PROCEDURE — 99213 OFFICE O/P EST LOW 20 MIN: CPT | Mod: S$PBB,TH,, | Performed by: OBSTETRICS & GYNECOLOGY

## 2020-06-08 PROCEDURE — U0002 COVID-19 LAB TEST NON-CDC: HCPCS

## 2020-06-08 PROCEDURE — 99999 PR PBB SHADOW E&M-EST. PATIENT-LVL III: CPT | Mod: PBBFAC,,, | Performed by: OBSTETRICS & GYNECOLOGY

## 2020-06-08 PROCEDURE — 86592 SYPHILIS TEST NON-TREP QUAL: CPT

## 2020-06-08 PROCEDURE — 11000001 HC ACUTE MED/SURG PRIVATE ROOM

## 2020-06-08 PROCEDURE — 63600175 PHARM REV CODE 636 W HCPCS: Performed by: OBSTETRICS & GYNECOLOGY

## 2020-06-08 PROCEDURE — 72100002 HC LABOR CARE, 1ST 8 HOURS

## 2020-06-08 PROCEDURE — 99213 PR OFFICE/OUTPT VISIT, EST, LEVL III, 20-29 MIN: ICD-10-PCS | Mod: S$PBB,TH,, | Performed by: OBSTETRICS & GYNECOLOGY

## 2020-06-08 PROCEDURE — 85025 COMPLETE CBC W/AUTO DIFF WBC: CPT

## 2020-06-08 RX ORDER — TERBUTALINE SULFATE 1 MG/ML
0.25 INJECTION SUBCUTANEOUS
Status: DISCONTINUED | OUTPATIENT
Start: 2020-06-08 | End: 2020-06-09

## 2020-06-08 RX ORDER — OXYTOCIN/RINGER'S LACTATE 30/500 ML
334 PLASTIC BAG, INJECTION (ML) INTRAVENOUS ONCE
Status: DISCONTINUED | OUTPATIENT
Start: 2020-06-08 | End: 2020-06-09

## 2020-06-08 RX ORDER — SIMETHICONE 80 MG
1 TABLET,CHEWABLE ORAL 4 TIMES DAILY PRN
Status: DISCONTINUED | OUTPATIENT
Start: 2020-06-08 | End: 2020-06-09

## 2020-06-08 RX ORDER — CALCIUM CARBONATE 200(500)MG
500 TABLET,CHEWABLE ORAL 3 TIMES DAILY PRN
Status: DISCONTINUED | OUTPATIENT
Start: 2020-06-08 | End: 2020-06-09

## 2020-06-08 RX ORDER — OXYTOCIN/RINGER'S LACTATE 30/500 ML
95 PLASTIC BAG, INJECTION (ML) INTRAVENOUS ONCE
Status: DISCONTINUED | OUTPATIENT
Start: 2020-06-08 | End: 2020-06-11 | Stop reason: HOSPADM

## 2020-06-08 RX ORDER — MISOPROSTOL 100 MCG
50 TABLET ORAL EVERY 4 HOURS
Status: DISCONTINUED | OUTPATIENT
Start: 2020-06-08 | End: 2020-06-08

## 2020-06-08 RX ORDER — SODIUM CHLORIDE, SODIUM LACTATE, POTASSIUM CHLORIDE, CALCIUM CHLORIDE 600; 310; 30; 20 MG/100ML; MG/100ML; MG/100ML; MG/100ML
INJECTION, SOLUTION INTRAVENOUS CONTINUOUS
Status: DISCONTINUED | OUTPATIENT
Start: 2020-06-08 | End: 2020-06-09

## 2020-06-08 RX ORDER — OXYTOCIN/RINGER'S LACTATE 30/500 ML
2 PLASTIC BAG, INJECTION (ML) INTRAVENOUS CONTINUOUS
Status: DISCONTINUED | OUTPATIENT
Start: 2020-06-08 | End: 2020-06-09

## 2020-06-08 RX ORDER — SODIUM CHLORIDE 9 MG/ML
INJECTION, SOLUTION INTRAVENOUS CONTINUOUS
Status: DISCONTINUED | OUTPATIENT
Start: 2020-06-08 | End: 2020-06-09

## 2020-06-08 RX ORDER — SODIUM CHLORIDE 9 MG/ML
INJECTION, SOLUTION INTRAVENOUS
Status: DISCONTINUED | OUTPATIENT
Start: 2020-06-08 | End: 2020-06-09

## 2020-06-08 RX ORDER — ONDANSETRON 8 MG/1
8 TABLET, ORALLY DISINTEGRATING ORAL EVERY 8 HOURS PRN
Status: DISCONTINUED | OUTPATIENT
Start: 2020-06-08 | End: 2020-06-09

## 2020-06-08 RX ADMIN — SODIUM CHLORIDE, SODIUM LACTATE, POTASSIUM CHLORIDE, AND CALCIUM CHLORIDE 1000 ML: .6; .31; .03; .02 INJECTION, SOLUTION INTRAVENOUS at 11:06

## 2020-06-08 RX ADMIN — SODIUM CHLORIDE, SODIUM LACTATE, POTASSIUM CHLORIDE, AND CALCIUM CHLORIDE: .6; .31; .03; .02 INJECTION, SOLUTION INTRAVENOUS at 09:06

## 2020-06-08 NOTE — PROGRESS NOTES
"  No complaints today   No nausea or vomiting   Denies vaginal bleeding or cramping     Prenatal labs reviewed  Aneuploidy screen  = Quad today   Anatomy   US at 20 weeks incomplete, will need @30w   Consents= signed   Diabetes screen= abnormal and abnormal 3hr GTT   Growth US= done  TdaP= given   GBBS= neg  BC=     General Pregnancy  recommendations given  PNV + H2O intake    BS log- forgot to bring it      Per patient ar " on target"     IOL  6/9/20     Roland Bryant M.D.   OB/GYN      "

## 2020-06-09 LAB — RPR SER QL: NORMAL

## 2020-06-09 PROCEDURE — 59409 OBSTETRICAL CARE: CPT | Mod: GB,,, | Performed by: OBSTETRICS & GYNECOLOGY

## 2020-06-09 PROCEDURE — 59409 PR OBSTETRICAL CARE,VAG DELIV ONLY: ICD-10-PCS | Mod: GB,,, | Performed by: OBSTETRICS & GYNECOLOGY

## 2020-06-09 PROCEDURE — 72100003 HC LABOR CARE, EA. ADDL. 8 HRS

## 2020-06-09 PROCEDURE — 25000003 PHARM REV CODE 250: Performed by: EMERGENCY MEDICINE

## 2020-06-09 PROCEDURE — 51702 INSERT TEMP BLADDER CATH: CPT

## 2020-06-09 PROCEDURE — 72200005 HC VAGINAL DELIVERY LEVEL II

## 2020-06-09 PROCEDURE — 11000001 HC ACUTE MED/SURG PRIVATE ROOM

## 2020-06-09 PROCEDURE — C1751 CATH, INF, PER/CENT/MIDLINE: HCPCS | Performed by: ANESTHESIOLOGY

## 2020-06-09 PROCEDURE — 62326 NJX INTERLAMINAR LMBR/SAC: CPT | Performed by: EMERGENCY MEDICINE

## 2020-06-09 PROCEDURE — 25000003 PHARM REV CODE 250: Performed by: OBSTETRICS & GYNECOLOGY

## 2020-06-09 PROCEDURE — 27200710 HC EPIDURAL INFUSION PUMP SET: Performed by: ANESTHESIOLOGY

## 2020-06-09 PROCEDURE — 63600175 PHARM REV CODE 636 W HCPCS: Performed by: OBSTETRICS & GYNECOLOGY

## 2020-06-09 RX ORDER — DIPHENHYDRAMINE HCL 25 MG
25 CAPSULE ORAL EVERY 4 HOURS PRN
Status: DISCONTINUED | OUTPATIENT
Start: 2020-06-09 | End: 2020-06-11 | Stop reason: HOSPADM

## 2020-06-09 RX ORDER — DOCUSATE SODIUM 100 MG/1
200 CAPSULE, LIQUID FILLED ORAL 2 TIMES DAILY PRN
Status: DISCONTINUED | OUTPATIENT
Start: 2020-06-09 | End: 2020-06-11 | Stop reason: HOSPADM

## 2020-06-09 RX ORDER — OXYTOCIN/RINGER'S LACTATE 30/500 ML
95 PLASTIC BAG, INJECTION (ML) INTRAVENOUS ONCE
Status: CANCELLED | OUTPATIENT
Start: 2020-06-09 | End: 2020-06-09

## 2020-06-09 RX ORDER — OXYTOCIN/RINGER'S LACTATE 30/500 ML
95 PLASTIC BAG, INJECTION (ML) INTRAVENOUS ONCE
Status: DISCONTINUED | OUTPATIENT
Start: 2020-06-09 | End: 2020-06-11 | Stop reason: HOSPADM

## 2020-06-09 RX ORDER — EPHEDRINE SULFATE 50 MG/ML
INJECTION, SOLUTION INTRAVENOUS
Status: DISCONTINUED | OUTPATIENT
Start: 2020-06-09 | End: 2020-06-09

## 2020-06-09 RX ORDER — SODIUM CHLORIDE 9 MG/ML
INJECTION, SOLUTION INTRAVENOUS CONTINUOUS
Status: CANCELLED | OUTPATIENT
Start: 2020-06-09

## 2020-06-09 RX ORDER — CARBOPROST TROMETHAMINE 250 UG/ML
250 INJECTION, SOLUTION INTRAMUSCULAR
Status: DISCONTINUED | OUTPATIENT
Start: 2020-06-09 | End: 2020-06-11 | Stop reason: HOSPADM

## 2020-06-09 RX ORDER — DIPHENHYDRAMINE HYDROCHLORIDE 50 MG/ML
25 INJECTION INTRAMUSCULAR; INTRAVENOUS EVERY 4 HOURS PRN
Status: DISCONTINUED | OUTPATIENT
Start: 2020-06-09 | End: 2020-06-11 | Stop reason: HOSPADM

## 2020-06-09 RX ORDER — SIMETHICONE 80 MG
1 TABLET,CHEWABLE ORAL EVERY 6 HOURS PRN
Status: DISCONTINUED | OUTPATIENT
Start: 2020-06-09 | End: 2020-06-11 | Stop reason: HOSPADM

## 2020-06-09 RX ORDER — FENTANYL/BUPIVACAINE/NS/PF 2MCG/ML-.1
PLASTIC BAG, INJECTION (ML) INJECTION CONTINUOUS PRN
Status: DISCONTINUED | OUTPATIENT
Start: 2020-06-09 | End: 2020-06-09

## 2020-06-09 RX ORDER — PRENATAL WITH FERROUS FUM AND FOLIC ACID 3080; 920; 120; 400; 22; 1.84; 3; 20; 10; 1; 12; 200; 27; 25; 2 [IU]/1; [IU]/1; MG/1; [IU]/1; MG/1; MG/1; MG/1; MG/1; MG/1; MG/1; UG/1; MG/1; MG/1; MG/1; MG/1
1 TABLET ORAL DAILY
Status: DISCONTINUED | OUTPATIENT
Start: 2020-06-09 | End: 2020-06-11 | Stop reason: HOSPADM

## 2020-06-09 RX ORDER — OXYCODONE AND ACETAMINOPHEN 5; 325 MG/1; MG/1
1 TABLET ORAL EVERY 4 HOURS PRN
Status: DISCONTINUED | OUTPATIENT
Start: 2020-06-09 | End: 2020-06-11 | Stop reason: HOSPADM

## 2020-06-09 RX ORDER — ACETAMINOPHEN 325 MG/1
650 TABLET ORAL EVERY 6 HOURS PRN
Status: DISCONTINUED | OUTPATIENT
Start: 2020-06-09 | End: 2020-06-11 | Stop reason: HOSPADM

## 2020-06-09 RX ORDER — FENTANYL/BUPIVACAINE/NS/PF 2MCG/ML-.1
PLASTIC BAG, INJECTION (ML) INJECTION
Status: COMPLETED
Start: 2020-06-09 | End: 2020-06-09

## 2020-06-09 RX ORDER — ONDANSETRON 8 MG/1
8 TABLET, ORALLY DISINTEGRATING ORAL EVERY 8 HOURS PRN
Status: DISCONTINUED | OUTPATIENT
Start: 2020-06-09 | End: 2020-06-11 | Stop reason: HOSPADM

## 2020-06-09 RX ORDER — NAPROXEN 500 MG/1
500 TABLET ORAL EVERY 8 HOURS PRN
Status: DISCONTINUED | OUTPATIENT
Start: 2020-06-09 | End: 2020-06-11 | Stop reason: HOSPADM

## 2020-06-09 RX ORDER — METHYLERGONOVINE MALEATE 0.2 MG/ML
200 INJECTION INTRAVENOUS
Status: DISCONTINUED | OUTPATIENT
Start: 2020-06-09 | End: 2020-06-11 | Stop reason: HOSPADM

## 2020-06-09 RX ORDER — MISOPROSTOL 200 UG/1
800 TABLET ORAL
Status: DISCONTINUED | OUTPATIENT
Start: 2020-06-09 | End: 2020-06-11 | Stop reason: HOSPADM

## 2020-06-09 RX ADMIN — NAPROXEN 500 MG: 500 TABLET ORAL at 02:06

## 2020-06-09 RX ADMIN — EPHEDRINE SULFATE 25 MG: 50 INJECTION, SOLUTION INTRAMUSCULAR; INTRAVENOUS; SUBCUTANEOUS at 03:06

## 2020-06-09 RX ADMIN — Medication 10 ML/HR: at 12:06

## 2020-06-09 RX ADMIN — SODIUM CHLORIDE, SODIUM LACTATE, POTASSIUM CHLORIDE, AND CALCIUM CHLORIDE: .6; .31; .03; .02 INJECTION, SOLUTION INTRAVENOUS at 01:06

## 2020-06-09 RX ADMIN — OXYCODONE HYDROCHLORIDE AND ACETAMINOPHEN 1 TABLET: 5; 325 TABLET ORAL at 07:06

## 2020-06-09 RX ADMIN — Medication 2 MILLI-UNITS/MIN: at 12:06

## 2020-06-09 RX ADMIN — OXYCODONE HYDROCHLORIDE AND ACETAMINOPHEN 1 TABLET: 5; 325 TABLET ORAL at 02:06

## 2020-06-09 NOTE — L&D DELIVERY NOTE
[facility]  Vaginal Delivery   Operative Note    DELIVERY NOTE:    After complete dilatation and +2 station  Patient pushes x 15 minutes , delivering    One viable infant sex   ( x )M / ( -) F    , in OA position   Umbilical cord was clamped and cut  , cord  blood sample was  collected   Uterine cavity manually revised for placenta.membranes and clots     Nuchal cord= NO (x)  YES (-)  Shoulder dystocia= NO   Apgar 9/9  Amniotic fluid= clear   Placenta= normal intact , complete   Umbilical Cord= 3 vessels     No cervical tears   Episiotomy =         Yes ( - )      NO  (  X)   Perineal tears =     Yes( -)    NO  ( - )  Vaginal tears=       Yes ( - )     NO  ( X)    EBL =  300cc  Sponge, lap, needle counts were correct   Vaginal sweep performed     No complications     Roland Bryant M.D.   OB/GYN        Indications:  (normal spontaneous vaginal delivery)  Pregnancy complicated by:   Patient Active Problem List   Diagnosis    Abdominal pain    Decreased fetal movement    39 weeks gestation of pregnancy     (normal spontaneous vaginal delivery)     Admitting GA: 39w0d    Delivery Information for Gus Nava    Birth information:  YOB: 2020   Time of birth: 10:21 AM   Sex: male   Head Delivery Date/Time:     Delivery type:    Gestational Age: 39w0d    Delivery Providers    Delivering clinician:             Measurements    Weight:    Length:           Apgars    Living status:    Apgars:   1 min.:   5 min.:   10 min.:   15 min.:   20 min.:     Skin color:          Heart rate:          Reflex irritability:          Muscle tone:          Respiratory effort:          Total:                                 Interventions/Resuscitation         Cord    No data filed        Placenta    Placenta delivery date/time:    Placenta removal:             Labor Events:       labor:       Labor Onset Date/Time:         Dilation Complete Date/Time:         Start Pushing  Date/Time:         Start Pushing Date/Time:       Rupture Date/Time: 20  0742         Rupture type:           Fluid Amount:        Fluid Color: Clear      Fluid Odor:        Membrane Status: ARM (Artificial Rupture)               steroids:       Antibiotics given for GBS:       Induction:       Indications for induction:        Augmentation:       Indications for augmentation:       Labor complications:       Additional complications:          Cervical ripening:                     Delivery:      Episiotomy:       Indication for Episiotomy:       Perineal Lacerations:   Repaired:      Periurethral Laceration:   Repaired:     Labial Laceration:   Repaired:     Sulcus Laceration:   Repaired:     Vaginal Laceration:   Repaired:     Cervical Laceration:   Repaired:     Repair suture:       Repair # of packets:       Last Value - EBL - Nursing (mL):       Sum - EBL - Nursing (mL): 0     Last Value - EBL - Anesthesia (mL):      Calculated QBL (mL):        Vaginal Sweep Performed:       Surgicount Correct:         Other providers:            Details (if applicable):  Trial of Labor      Categorization:      Priority:     Indications for :     Incision Type:       Additional  information:  Forceps:    Vacuum:    Breech:    Observed anomalies    Other (Comments):           Roland Bryant M.D.   OB/GYN    2020

## 2020-06-09 NOTE — H&P
UPDATED HISTORY AND PHYSICAL        2020  Subjective:       Hoa Nava is a 25 y.o.  female with IUP at 39w0d weeks gestation presented for labor induction    Contractions have been occuring Q5 min and have increased in intensity.  This IUP is complicated by Gestational diabetes   .  Patient reports contractions, denies vaginal bleeding, denies LOF.   Fetal Movement: normal.     PMHx:   Past Medical History:   Diagnosis Date    Diabetes mellitus        PSHx:   Past Surgical History:   Procedure Laterality Date    DILATION AND CURETTAGE OF UTERUS  2019    [place]       All: Review of patient's allergies indicates:  No Known Allergies    Meds:   Medications Prior to Admission   Medication Sig Dispense Refill Last Dose    blood sugar diagnostic (TRUE METRIX GLUCOSE TEST STRIP) Strp 1 strip by Misc.(Non-Drug; Combo Route) route 4 (four) times daily. 100 strip 3 Taking    blood-glucose meter Misc Use as instructed 1 each 0 Taking    lancets 33 gauge Misc 1 lancet by Misc.(Non-Drug; Combo Route) route 4 (four) times daily. 100 each 3 Taking    OPW TEST CLAIM - DO NOT FILL OPW test claim. Do not fill. 100 tablet 0 Taking    prenatal 21-iron fu-folic acid (PRENATAL COMPLETE) 14 mg iron- 400 mcg Tab Take 1 tablet by mouth once daily. 30 tablet 0 Taking    prenatal vit,katie 74-iron-folic 27 mg iron- 1 mg Tab Take 1 tablet by mouth once daily. 90 tablet 3 Taking    promethazine (PHENERGAN) 25 MG tablet Take 1 tablet (25 mg total) by mouth every 6 (six) hours as needed for Nausea. 25 tablet 0 Taking    pyridoxine, vitamin B6, (B-6) 100 MG Tab Take 1 tablet (100 mg total) by mouth 3 (three) times daily before meals. 60 tablet 3 Taking       SH:   Social History     Socioeconomic History    Marital status: Single     Spouse name: Not on file    Number of children: Not on file    Years of education: Not on file    Highest education level: Not on file   Occupational History    Not on  file   Social Needs    Financial resource strain: Not on file    Food insecurity:     Worry: Not on file     Inability: Not on file    Transportation needs:     Medical: Not on file     Non-medical: Not on file   Tobacco Use    Smoking status: Never Smoker    Smokeless tobacco: Never Used   Substance and Sexual Activity    Alcohol use: Not Currently    Drug use: Never    Sexual activity: Yes     Partners: Male     Birth control/protection: None   Lifestyle    Physical activity:     Days per week: Not on file     Minutes per session: Not on file    Stress: Not on file   Relationships    Social connections:     Talks on phone: Not on file     Gets together: Not on file     Attends Confucianism service: Not on file     Active member of club or organization: Not on file     Attends meetings of clubs or organizations: Not on file     Relationship status: Not on file   Other Topics Concern    Not on file   Social History Narrative    Not on file       FH:   Family History   Problem Relation Age of Onset    Hypertension Father        OBHx:   OB History    Para Term  AB Living   2 0 0 0 1 0   SAB TAB Ectopic Multiple Live Births   1 0 0 0 0      # Outcome Date GA Lbr Alexander/2nd Weight Sex Delivery Anes PTL Lv   2 Current            1 SAB 2019 3w0d              Review of Systems: Non contributory      Objective:       /66   Pulse 96   Temp 98.3 °F (36.8 °C) (Tympanic)   Resp 16   LMP 09/10/2019   SpO2 99%   Breastfeeding? No     Vitals:    20 0605 20 0610 20 0615 20 0620   BP:   116/66    Pulse: 81 101 94 96   Resp:  16     Temp:  98.3 °F (36.8 °C)     TempSrc:  Tympanic     SpO2: 99% 99% 99% 99%       General:   alert, appears stated age and cooperative   Lungs:   clear to auscultation bilaterally   Heart:   regular rate and rhythm, S1, S2 normal, no murmur, click, rub or gallop   Abdomen:  soft, non-tender; bowel sounds normal; no masses,  no organomegaly    Extremities negative edema, negative erythema   FHT: 155 Cat 1 (reassuring)                 TOCO: Q 5 minutes   Presentations: cephalic by ultrasound   Cervix:     Dilation: 8    Effacement: 75%    Station:  0    Consistency: medium    Position: middle     AROM clear fluid     Lab Review  Blood Type B POS  GBBS: negative         Assessment:       39w0d weeks gestation.  Gestational diabetes     Patient Active Problem List   Diagnosis    Abdominal pain    Decreased fetal movement    39 weeks gestation of pregnancy     (normal spontaneous vaginal delivery)          Plan:      Risks, benefits, alternatives and possible complications have been discussed in detail with the patient.   - Consents signed and to chart  - Admit to Labor and Delivery unit    - Epidural per Anesthesia  - Draw CBC, T&S  - Recheck in 2 hrs or PRN            Roland Bryant M.D.   OB/GYN    2020

## 2020-06-09 NOTE — ANESTHESIA PREPROCEDURE EVALUATION
06/08/2020  Hoa Nava is a 25 y.o., female.    Anesthesia Evaluation    I have reviewed the Patient Summary Reports.    I have reviewed the Nursing Notes.       Review of Systems  Anesthesia Hx:  Denies Hx of Anesthetic complications   Denies Personal Hx of Anesthesia complications.   Social:  Non-Smoker, No Alcohol Use    Hematology/Oncology:         -- Anemia:   EENT/Dental:EENT/Dental Normal   Cardiovascular:  Cardiovascular Normal Exercise tolerance: good  Denies MI.      Pulmonary:  Pulmonary Normal  Denies Asthma.  Denies Sleep Apnea.    Renal/:  Renal/ Normal     Hepatic/GI:  Hepatic/GI Normal    Neurological:  Neurology Normal  Denies Headaches.    Endocrine:   Diabetes        Physical Exam  General:  Well nourished    Airway/Jaw/Neck:  AIRWAY FINDINGS: Normal    Eyes/Ears/Nose:  EYES/EARS/NOSE FINDINGS: Normal   Dental:  DENTAL FINDINGS: Normal   Chest/Lungs:  Chest/Lungs Clear    Heart/Vascular:  Heart Findings: Normal    Abdomen:  Abdomen Findings:  Gravid          Anesthesia Plan  Type of Anesthesia, risks & benefits discussed:  Anesthesia Type:  epidural  Patient's Preference:   Intra-op Monitoring Plan:   Intra-op Monitoring Plan Comments:   Post Op Pain Control Plan:   Post Op Pain Control Plan Comments:   Induction:    Beta Blocker:         Informed Consent: Patient understands risks and agrees with Anesthesia plan.  Questions answered. Anesthesia consent signed with patient.  ASA Score: 2     Day of Surgery Review of History & Physical:        Anesthesia Plan Notes:  used         Ready For Surgery From Anesthesia Perspective.

## 2020-06-09 NOTE — PROGRESS NOTES
DATE: 2020    S: 25 y.o.  at 39w0d   O:   BP: 116/66    FHT: 145-155 x; Mod variability , Cat 1   [place]/IUPC: q 2-3 minutes   SVE:  Complete dilatation  +1  Station         ASSESSMENT: 39w0d   Patient Active Problem List   Diagnosis    Abdominal pain    Decreased fetal movement    39 weeks gestation of pregnancy     (normal spontaneous vaginal delivery)       PLAN:    Continue Close Maternal/Fetal Monitoring  Pitocin Augmentation per protocol  Recheck 2 hours or PRN    Roland Bryant M.D.   OB/GYN

## 2020-06-09 NOTE — NURSING
Repetitive late decels noted; bp's lower. Anesthesia called for ephedine; pt turned to left tilt, ivf bolus started.

## 2020-06-09 NOTE — NURSING
Mild tachysystole noted, pit decreased to 2mu/min. Pt reports feeling pressure, sve 8/90/-2 with bbow. Turned to right side for comfort, pt reports feeling much more on her right side than left.

## 2020-06-09 NOTE — NURSING
Lower bp's, subtle late decels. Anesthesia called for ephedrine dose. Pt in left tilt, resting, no pain.

## 2020-06-09 NOTE — NURSING
Placed on monitors, admit assessment done through sal[facility]ador. 26y/o  at 38-6weeks. Pt states she is nervous but doesn't know why. Appropriate reassurance given. poc explained. Reactive fht's noted, irreg mild ctxs. Iv started to left hand, 20g jelco. lr started per pump at 100cc/hr. Labs ordered, covid swab done. No c/o, nad. Questions answered.

## 2020-06-09 NOTE — ANESTHESIA PROCEDURE NOTES
Epidural    Patient location during procedure: OB   Reason for block: primary anesthetic   Reason for block: at surgeon's request, post-op pain management  Diagnosis: Pregnancy   Start time: 6/9/2020 12:40 AM  Timeout: 6/9/2020 12:30 AM  End time: 6/9/2020 12:50 AM    Staffing  Performing Provider: David Rosenbaum MD  Authorizing Provider: Hi Dimas MD        Preanesthetic Checklist  Completed: patient identified, site marked, surgical consent, pre-op evaluation, timeout performed, IV checked, risks and benefits discussed, monitors and equipment checked, anesthesia consent given, hand hygiene performed and patient being monitored  Preparation  Patient position: sitting  Prep: ChloraPrep  Patient monitoring: Pulse Ox and Blood Pressure  Epidural  Skin Anesthetic: lidocaine 1%  Skin Wheal: 3 mL  Administration type: single shot  Approach: midline  Interspace: L3-4    Injection technique: KYLE air  Needle and Epidural Catheter  Needle type: Tuohy   Needle gauge: 17  Needle length: 3.5 inches  Needle insertion depth: 6 cm  Catheter type: end hole  Catheter size: 19 G  Catheter at skin depth: 12 cm  Test dose: 3 mL of lidocaine 1.5% with Epi 1-to-200,000  Additional Documentation: incremental injection, negative aspiration for heme and CSF, no paresthesia on injection, no signs/symptoms of IV or SA injection, no significant pain on injection and no significant complaints from patient  Needle localization: anatomical landmarks  Assessment  Upper dermatomal levels - Left: T10  Right: T10   Dermatomal levels determined by alcohol wipe  Ease of block: easy  Patient's tolerance of the procedure: comfortable throughout blockNo inadvertent dural puncture with Tuohy.  Dural puncture not performed with spinal needle

## 2020-06-10 PROBLEM — D64.9 CHRONIC ANEMIA: Status: ACTIVE | Noted: 2020-06-10

## 2020-06-10 LAB
BASOPHILS # BLD AUTO: 0.03 K/UL (ref 0–0.2)
BASOPHILS NFR BLD: 0.3 % (ref 0–1.9)
DIFFERENTIAL METHOD: ABNORMAL
EOSINOPHIL # BLD AUTO: 0.2 K/UL (ref 0–0.5)
EOSINOPHIL NFR BLD: 2.2 % (ref 0–8)
ERYTHROCYTE [DISTWIDTH] IN BLOOD BY AUTOMATED COUNT: 14.6 % (ref 11.5–14.5)
HCT VFR BLD AUTO: 33.3 % (ref 37–48.5)
HGB BLD-MCNC: 10.6 G/DL (ref 12–16)
IMM GRANULOCYTES # BLD AUTO: 0.07 K/UL (ref 0–0.04)
IMM GRANULOCYTES NFR BLD AUTO: 0.7 % (ref 0–0.5)
LYMPHOCYTES # BLD AUTO: 2.3 K/UL (ref 1–4.8)
LYMPHOCYTES NFR BLD: 25 % (ref 18–48)
MCH RBC QN AUTO: 29 PG (ref 27–31)
MCHC RBC AUTO-ENTMCNC: 31.8 G/DL (ref 32–36)
MCV RBC AUTO: 91 FL (ref 82–98)
MONOCYTES # BLD AUTO: 0.7 K/UL (ref 0.3–1)
MONOCYTES NFR BLD: 7.8 % (ref 4–15)
NEUTROPHILS # BLD AUTO: 6 K/UL (ref 1.8–7.7)
NEUTROPHILS NFR BLD: 64 % (ref 38–73)
NRBC BLD-RTO: 0 /100 WBC
PLATELET # BLD AUTO: 171 K/UL (ref 150–350)
PMV BLD AUTO: 10.8 FL (ref 9.2–12.9)
RBC # BLD AUTO: 3.65 M/UL (ref 4–5.4)
WBC # BLD AUTO: 9.35 K/UL (ref 3.9–12.7)

## 2020-06-10 PROCEDURE — 99238 PR HOSPITAL DISCHARGE DAY,<30 MIN: ICD-10-PCS | Mod: ,,, | Performed by: OBSTETRICS & GYNECOLOGY

## 2020-06-10 PROCEDURE — 85025 COMPLETE CBC W/AUTO DIFF WBC: CPT

## 2020-06-10 PROCEDURE — 25000003 PHARM REV CODE 250: Performed by: OBSTETRICS & GYNECOLOGY

## 2020-06-10 PROCEDURE — 99238 HOSP IP/OBS DSCHRG MGMT 30/<: CPT | Mod: ,,, | Performed by: OBSTETRICS & GYNECOLOGY

## 2020-06-10 PROCEDURE — 36415 COLL VENOUS BLD VENIPUNCTURE: CPT

## 2020-06-10 PROCEDURE — 11000001 HC ACUTE MED/SURG PRIVATE ROOM

## 2020-06-10 RX ORDER — FERROUS SULFATE 325(65) MG
325 TABLET, DELAYED RELEASE (ENTERIC COATED) ORAL 2 TIMES DAILY
Status: DISCONTINUED | OUTPATIENT
Start: 2020-06-10 | End: 2020-06-11 | Stop reason: HOSPADM

## 2020-06-10 RX ORDER — NAPROXEN 500 MG/1
500 TABLET ORAL EVERY 8 HOURS PRN
Qty: 25 TABLET | Refills: 0 | Status: SHIPPED | OUTPATIENT
Start: 2020-06-10 | End: 2022-05-24

## 2020-06-10 RX ORDER — FERROUS SULFATE 325(65) MG
325 TABLET ORAL 2 TIMES DAILY
Qty: 60 TABLET | Refills: 3 | Status: SHIPPED | OUTPATIENT
Start: 2020-06-10

## 2020-06-10 RX ADMIN — FERROUS SULFATE TAB EC 325 MG (65 MG FE EQUIVALENT) 325 MG: 325 (65 FE) TABLET DELAYED RESPONSE at 01:06

## 2020-06-10 RX ADMIN — NAPROXEN 500 MG: 500 TABLET ORAL at 08:06

## 2020-06-10 RX ADMIN — OXYCODONE HYDROCHLORIDE AND ACETAMINOPHEN 1 TABLET: 5; 325 TABLET ORAL at 12:06

## 2020-06-10 RX ADMIN — NAPROXEN 500 MG: 500 TABLET ORAL at 12:06

## 2020-06-10 RX ADMIN — PRENATAL VIT W/ FE FUMARATE-FA TAB 27-0.8 MG 1 TABLET: 27-0.8 TAB at 08:06

## 2020-06-10 RX ADMIN — OXYCODONE HYDROCHLORIDE AND ACETAMINOPHEN 1 TABLET: 5; 325 TABLET ORAL at 06:06

## 2020-06-10 RX ADMIN — FERROUS SULFATE TAB EC 325 MG (65 MG FE EQUIVALENT) 325 MG: 325 (65 FE) TABLET DELAYED RESPONSE at 09:06

## 2020-06-10 RX ADMIN — OXYCODONE HYDROCHLORIDE AND ACETAMINOPHEN 1 TABLET: 5; 325 TABLET ORAL at 09:06

## 2020-06-10 NOTE — PROGRESS NOTES
Hoa Nava is a 25 y.o. female   PPD #1 status post  Spontaneous vaginal delivery. has no problems, feels well, no complaints  Patient reports none abd pain that is well Relieved by Oral pain medications. Lochia is mild to moderate  and decreasing, Voiding without difficulty Ambulating with no difficulty, has passed flatus, has had BM,  Patient does not plan to breast feed.    Objective:       /74   Pulse 74   Temp 98.2 °F (36.8 °C)   Resp 18   LMP 09/10/2019   SpO2 99%   Breastfeeding? Unknown   Vitals:    06/09/20 1015 06/09/20 1400 06/09/20 2000 06/10/20 0200   BP:  118/62 114/78 122/74   BP Location:  Right arm     Patient Position:  Sitting     Pulse:  80 70 74   Resp:  18 18 18   Temp: 99.3 °F (37.4 °C) 98.7 °F (37.1 °C) 98.1 °F (36.7 °C) 98.2 °F (36.8 °C)   TempSrc: Oral Oral     SpO2:  99%       General:   alert, appears stated age and cooperative   Lungs:   clear to auscultation bilaterally   Heart:   regular rate and rhythm, S1, S2 normal, no murmur, click, rub or gallop   Abdomen:  soft, non-tender; bowel sounds normal; no masses,  no organomegaly   Uterus:  firm located at the umblicus.        Extremities: peripheral pulses normal, no pedal edema, no clubbing or cyanosis     Lab Review  Recent Results (from the past 72 hour(s))   COVID-19 Rapid Screening    Collection Time: 06/08/20  8:30 PM   Result Value Ref Range    SARS-CoV-2 RNA, Amplification, Qual Negative Negative   Rapid HIV    Collection Time: 06/08/20  9:13 PM   Result Value Ref Range    HIV Rapid Testing Non-Reactive Negative   RPR    Collection Time: 06/08/20  9:13 PM   Result Value Ref Range    RPR Non-reactive Non-reactive   Type & Screen    Collection Time: 06/08/20  9:13 PM   Result Value Ref Range    Group & Rh B POS     Indirect Tabitha NEG    CBC auto differential    Collection Time: 06/08/20  9:13 PM   Result Value Ref Range    WBC 8.12 3.90 - 12.70 K/uL    RBC 3.94 (L) 4.00 - 5.40 M/uL    Hemoglobin 11.7 (L)  12.0 - 16.0 g/dL    Hematocrit 35.7 (L) 37.0 - 48.5 %    Mean Corpuscular Volume 91 82 - 98 fL    Mean Corpuscular Hemoglobin 29.7 27.0 - 31.0 pg    Mean Corpuscular Hemoglobin Conc 32.8 32.0 - 36.0 g/dL    RDW 14.1 11.5 - 14.5 %    Platelets 205 150 - 350 K/uL    MPV 10.5 9.2 - 12.9 fL    Immature Granulocytes 0.7 (H) 0.0 - 0.5 %    Gran # (ANC) 5.1 1.8 - 7.7 K/uL    Immature Grans (Abs) 0.06 (H) 0.00 - 0.04 K/uL    Lymph # 2.3 1.0 - 4.8 K/uL    Mono # 0.6 0.3 - 1.0 K/uL    Eos # 0.1 0.0 - 0.5 K/uL    Baso # 0.02 0.00 - 0.20 K/uL    nRBC 0 0 /100 WBC    Gran% 62.4 38.0 - 73.0 %    Lymph% 28.3 18.0 - 48.0 %    Mono% 7.5 4.0 - 15.0 %    Eosinophil% 0.9 0.0 - 8.0 %    Basophil% 0.2 0.0 - 1.9 %    Differential Method Automated    Comprehensive metabolic panel    Collection Time: 06/08/20  9:13 PM   Result Value Ref Range    Sodium 138 136 - 145 mmol/L    Potassium 3.8 3.5 - 5.1 mmol/L    Chloride 108 95 - 110 mmol/L    CO2 23 23 - 29 mmol/L    Glucose 94 70 - 110 mg/dL    BUN, Bld 5 (L) 6 - 20 mg/dL    Creatinine 0.6 0.5 - 1.4 mg/dL    Calcium 8.9 8.7 - 10.5 mg/dL    Total Protein 6.3 6.0 - 8.4 g/dL    Albumin 2.8 (L) 3.5 - 5.2 g/dL    Total Bilirubin 0.3 0.1 - 1.0 mg/dL    Alkaline Phosphatase 154 (H) 55 - 135 U/L    AST 11 10 - 40 U/L    ALT 11 10 - 44 U/L    Anion Gap 7 (L) 8 - 16 mmol/L    eGFR if African American >60 >60 mL/min/1.73 m^2    eGFR if non African American >60 >60 mL/min/1.73 m^2   CBC auto differential    Collection Time: 06/10/20  4:31 AM   Result Value Ref Range    WBC 9.35 3.90 - 12.70 K/uL    RBC 3.65 (L) 4.00 - 5.40 M/uL    Hemoglobin 10.6 (L) 12.0 - 16.0 g/dL    Hematocrit 33.3 (L) 37.0 - 48.5 %    Mean Corpuscular Volume 91 82 - 98 fL    Mean Corpuscular Hemoglobin 29.0 27.0 - 31.0 pg    Mean Corpuscular Hemoglobin Conc 31.8 (L) 32.0 - 36.0 g/dL    RDW 14.6 (H) 11.5 - 14.5 %    Platelets 171 150 - 350 K/uL    MPV 10.8 9.2 - 12.9 fL    Immature Granulocytes 0.7 (H) 0.0 - 0.5 %    Gran # (ANC)  6.0 1.8 - 7.7 K/uL    Immature Grans (Abs) 0.07 (H) 0.00 - 0.04 K/uL    Lymph # 2.3 1.0 - 4.8 K/uL    Mono # 0.7 0.3 - 1.0 K/uL    Eos # 0.2 0.0 - 0.5 K/uL    Baso # 0.03 0.00 - 0.20 K/uL    nRBC 0 0 /100 WBC    Gran% 64.0 38.0 - 73.0 %    Lymph% 25.0 18.0 - 48.0 %    Mono% 7.8 4.0 - 15.0 %    Eosinophil% 2.2 0.0 - 8.0 %    Basophil% 0.3 0.0 - 1.9 %    Differential Method Automated        I/O    Intake/Output Summary (Last 24 hours) at 6/10/2020 0945  Last data filed at 2020 1459  Gross per 24 hour   Intake --   Output 1606 ml   Net -1606 ml        Assessment:     Patient Active Problem List   Diagnosis    Abdominal pain    Decreased fetal movement    39 weeks gestation of pregnancy     (normal spontaneous vaginal delivery)        Plan:   1. Patient doing well. Continue routine management and advances.  2. Continue PO pain meds. Pain well controlled.  3. H/h 10/33.6.   4. Encourage ambulation. \    Possible D/C in the afternoon     Roland Bryant M.D.   OB/GYN    6/10/2020

## 2020-06-10 NOTE — LACTATION NOTE
[facility]  Lactation Note - Mom    SUMMARY     Maternal Assessment    Breast Density: Bilateral:, soft  Nipples: Bilateral:, graspable, other (see comments)(per mom )  Left Nipple Symptoms: tender  Right Nipple Symptoms: tender      LATCH Score         Breasts WDL    Breast WDL: WDL except, nipple symptoms  Left Nipple Symptoms: tender  Right Nipple Symptoms: tender    Maternal Infant Feeding    Maternal Preparation: breast care, hand hygiene  Maternal Emotional State: independent, relaxed  Pain with Feeding: other (see comments)(states with initial latch only)  Comfort Measures Following Feeding: air-drying encouraged, expressed milk applied, other (see comments)(lanolin provided)  Latch Assistance: other (see comments)(offered but pt declined, enc to call for latch check/assist )    Lactation Referrals    Lactation Referrals: pediatric care provider, outpatient lactation program  Outpatient Lactation Program Lactation Follow-up Date/Time: call lact ctr PRN  Pediatric Care Provider Lactation Follow-up Date/Time: f/u in 2-3 days or as instructed byt NNP/Ped    Lactation Interventions    Breast Care: Breastfeeding: manual expression to soften breast, milk massaged towards nipple, warm shower encouraged  Breastfeeding Assistance: feeding cue recognition promoted, feeding on demand promoted, support offered  Breast Care: Breastfeeding: manual expression to soften breast, milk massaged towards nipple, warm shower encouraged  Breastfeeding Assistance: feeding cue recognition promoted, feeding on demand promoted, support offered  Breastfeeding Support: diary/feeding log utilized, encouragement provided, maternal rest encouraged, maternal nutrition promoted, maternal hydration promoted, lactation counseling provided       Breastfeeding Session         Maternal Information

## 2020-06-10 NOTE — DISCHARGE SUMMARY
Delivery Discharge Summary  Obstetrics      Primary OB Clinician: Roland Bryant    Admission date: 2020  Discharge date: 06/10/2020    Admit Dx:   Patient Active Problem List   Diagnosis    Abdominal pain    Decreased fetal movement    39 weeks gestation of pregnancy     (normal spontaneous vaginal delivery)     Discharge Dx:   Patient Active Problem List   Diagnosis    Abdominal pain    Decreased fetal movement    39 weeks gestation of pregnancy     (normal spontaneous vaginal delivery)       Procedure:     Recent Labs   Lab 06/10/20  0431   WBC 9.35   RBC 3.65*   HGB 10.6*   HCT 33.3*      MCV 91   MCH 29.0   MCHC 31.8*       Hospital Course:  Pt is a 25 y.o. now , PPD # 1 was admitted on 2020 for labore induction   . On initial assessment, vital signs were stable and physical exam was Normal.   Infant was in (x)cephalic  / (-) breech   presentation. Patient was subsequently admitted to labor and delivery unit with signed consents.  Patient delivered a single viable  male. Please see delivery note for further details. Pt was in stable condition post delivery and was transferred to the Mother-Baby Unit. Her postpartum course was uncomplicated. On discharge day, patient's pain is well  controlled with oral pain medications. Pt is tolerating ambulation without SOB or CP, and PO diet without N/V. Reports lochia is minimal in degree. Denies any HA, vision changes, F/C, LE swelling. Denies any breast pain/soreness.  Pt in stable condition and ready for discharge, instructed to continue pain medications  and to follow up in the OB clinic in 4 weeks with       Delivery:    Episiotomy: None   Lacerations: None   Repair suture: None   Repair # of packets:     Blood loss (ml):       Birth information:  YOB: 2020   Time of birth: 10:21 AM   Sex: male   Delivery type: Vaginal, Spontaneous   Gestational Age: 39w0d    Delivery Clinician:      Other  providers:       Additional  information:  Forceps:    Vacuum:    Breech:    Observed anomalies      Living?:           APGARS  One minute Five minutes Ten minutes   Skin color:         Heart rate:         Grimace:         Muscle tone:         Breathing:         Totals: 9  9        Placenta: Delivered:       appearance      Patient Instructions:   Current Discharge Medication List      CONTINUE these medications which have NOT CHANGED    Details   blood sugar diagnostic (TRUE METRIX GLUCOSE TEST STRIP) Strp 1 strip by Misc.(Non-Drug; Combo Route) route 4 (four) times daily.  Qty: 100 strip, Refills: 3      blood-glucose meter Misc Use as instructed  Qty: 1 each, Refills: 0      lancets 33 gauge Misc 1 lancet by Misc.(Non-Drug; Combo Route) route 4 (four) times daily.  Qty: 100 each, Refills: 3      OPW TEST CLAIM - DO NOT FILL OPW test claim. Do not fill.  Qty: 100 tablet, Refills: 0      prenatal 21-iron fu-folic acid (PRENATAL COMPLETE) 14 mg iron- 400 mcg Tab Take 1 tablet by mouth once daily.  Qty: 30 tablet, Refills: 0      prenatal vit,katie 74-iron-folic 27 mg iron- 1 mg Tab Take 1 tablet by mouth once daily.  Qty: 90 tablet, Refills: 3      promethazine (PHENERGAN) 25 MG tablet Take 1 tablet (25 mg total) by mouth every 6 (six) hours as needed for Nausea.  Qty: 25 tablet, Refills: 0      pyridoxine, vitamin B6, (B-6) 100 MG Tab Take 1 tablet (100 mg total) by mouth 3 (three) times daily before meals.  Qty: 60 tablet, Refills: 3             Patient is Discharged  home today   General recommendations and alarm signs are given  Pelvic rest   Follow up with Dr Bryant  in  4   weeks   Rx sent electronically  To pharmacy on file   All questions answered       Roland Bryant M.D.   OB/GYN  6/10/2020

## 2020-06-10 NOTE — NURSING
VSS. NAD observed. Respirations even and unlabored. Tolerating regular diet. Voiding without difficulty. Denies pain at this time. Reports that breastfeeding is going well. Bonding well with infant. Plan of care reviewed with pt; pt verbalizes understanding. Breastfeeding assistance offered if needed.

## 2020-06-10 NOTE — ANESTHESIA POSTPROCEDURE EVALUATION
Anesthesia Post Evaluation    Patient: Hoa Nava    Procedure(s) Performed: * No procedures listed *    Final Anesthesia Type: CSE    Patient location during evaluation: labor & delivery  Patient participation: Yes- Able to Participate  Level of consciousness: awake and alert  Post-procedure vital signs: reviewed and stable  Pain management: adequate  Airway patency: patent    PONV status at discharge: No PONV  Anesthetic complications: no      Cardiovascular status: hemodynamically stable  Respiratory status: unassisted  Hydration status: euvolemic  Follow-up not needed.          Vitals Value Taken Time   /74 6/10/2020  2:00 AM   Temp 36.8 °C (98.2 °F) 6/10/2020  2:00 AM   Pulse 74 6/10/2020  2:00 AM   Resp 18 6/10/2020  2:00 AM   SpO2 99 % 6/9/2020  2:00 PM         No case tracking events are documented in the log.      Pain/Judy Score: Pain Rating Prior to Med Admin: 3 (6/10/2020  8:46 AM)  Pain Rating Post Med Admin: 0 (6/10/2020  1:00 AM)    No catheter in back  No headache/neckache/backache  Full return of neurological function  Able to urinate  Advised patient to report any new problems of back pain, especially with fever or decreasing bladder function occurring during coming days to weeks    Deep Aiken MD  [facility]  [phone]

## 2020-06-11 VITALS
OXYGEN SATURATION: 98 % | DIASTOLIC BLOOD PRESSURE: 66 MMHG | SYSTOLIC BLOOD PRESSURE: 127 MMHG | HEART RATE: 71 BPM | RESPIRATION RATE: 18 BRPM | TEMPERATURE: 99 F

## 2020-06-11 PROCEDURE — 25000003 PHARM REV CODE 250: Performed by: OBSTETRICS & GYNECOLOGY

## 2020-06-11 RX ADMIN — PRENATAL VIT W/ FE FUMARATE-FA TAB 27-0.8 MG 1 TABLET: 27-0.8 TAB at 08:06

## 2020-06-11 RX ADMIN — FERROUS SULFATE TAB EC 325 MG (65 MG FE EQUIVALENT) 325 MG: 325 (65 FE) TABLET DELAYED RESPONSE at 08:06

## 2020-06-11 RX ADMIN — NAPROXEN 500 MG: 500 TABLET ORAL at 10:06

## 2020-06-11 NOTE — NURSING
Reviewed discharge documentation and medications with patient. Pt verbalized f/u appt is to be scheduled w/ OB. Pt is bonding with baby. Smiles appropriately at baby. Family support noted at bedside.  Mother shows she is able to care for herself and baby. Patient reports having help at home. Pt transported via wheelchair with baby in arms for discharge.

## 2020-06-11 NOTE — LACTATION NOTE
Rounded on couplet. Mother is both breast and ff. Denies pain/needs. Confident and comfortable with feeding plan.  Mother will breastfeed on cue at least 8 or more times in 24 hours. Mother will monitor for adequate supply and monitor wet and dirty diapers. Mother will call for any breastfeeding needs.

## 2020-06-11 NOTE — DISCHARGE INSTRUCTIONS
"Instrucciones Para Aaron de [place]    Instrucciones a Seguir    Dieta regular  Actividad: Aumentarntar gradualmente  Morgan: Regadera o Tamela  Restricciones: No levantar nada pesado  Cuidado Personal: No tampones o duchas vaginales  Actividad Sexual: Mel relaciones sexuales  Planificacion Familiar: Consulta con ambrocio medico  Cuidado de los Senos: Use un sosten de soporte  Regresar al trabajo/escuela: Cuando ambrocio medico le indique    Cuando debe llamar al Doctor    *Fiebre de 100.4 o mas alto  *Nausea/Vomito persistente  *Secrecion de la incision  *Naima sangrado vaginal o coagulos  *Inflamacion/dolor en los brazos o las piernas  *Severo dolor de hugo, vision borrosa or desmayos  *Frequencia/ardor urinario  *Senales de depresion post-parto        La Depresion Post-Parto    Usted acaba de tener un lesvia'.  A pesar de que sabe que deberia sentirse emocionada y alarcon, lo que seinte son ganas de llorar sin motivo y tiene dificultades para realizar karina tareas diarias.  Usted pasa la mayor parte del tiempo diamante, cansada y desesperanzada, y hasta podria sentirse avergonzada o culpable.  Esperanza lo que le esta sucediendo no es culpa suya, y puede sentirse mejor.  Hable con ambrocio medico para que la ayude.     La Depresion Despues del Parto    Alber vez sienta cansancio y ganas de llorar roz despues de aaron a bernice.  Esta etapa melancolica, denominada "baby blues", puede hacerla sentir diamante y desesperanzada, o temerosa de que algo rosanna le vaya a pasar al lesvia.  Algunas mujeres hasta llegan a poner en glenna el que puedan ser buenas madres.    Que' es la Depresion?    La depresion es un trastorno del animo que afecta ambrocio manera de pernsar y de sentir.  El sintoma mas frecuente es un sentimiento de honda tristeza: tambien podria causane la sensacion de que ya no puede sobrellevar la rick.    Otros sintomas incluyen:      * Ganar o perder mucho peso  * Dormir en exceso o demasiado poco  * Estar cansada todo el tiempo  * Sentirse inquieta  * " Tener miedo de querer herir al lesvia'  * No tener interes por el lesvia'  * Sentirse inutil o culpable   * No encontrar placer en las cosas que solia gustarle hacer  * Dificultades para pensar con claridad o cristopher decisiones  * Pensar sobre la muerte o el suicidio     Que' Causa la Depresion Postparto?    La causea exacta de la depresion postarto se desconce, aunque posiblemente es el resultado de los cambios hormonales que suceden arlen y despues del parto.  Tambien puede deberse al cansancio que le causan las exigencias del lesvia' y el proceso de adaptacion a ambrocio maternidad.  Todos estos factores podrian deprimirla.  En algunos casos, existe sandra predisposicion genetica a desmond tipo de depresion.    La depresion puede tratarse    Lo sewell es que hay muchas maneras de tratar la depresion postparo.  Emprenda el primer paso para sentirse mejor hablando con ambrocio medico.     Recursos    * National Institutes of Mental Health-- 942-409-9420     www.nimh.nih.gov    * National [place] on Mental Illness -- 930-371-9380     Www.lis.org    * Mental Health [place] --  [phone]     Www.[facility].org    * National Suidide Hotline -- [phone]    2204-8522 [employer]  Todos los derechos reservados.  Esta informacion no pretende sustituir las atencion medica profesional.  Solo ambrocio medico puede diagnosticar y tratar un problema de tim.           Instrucciones de la lactancia maternal para los bebes recién nacidos:    La [employer] recomienda la leche maternal jennifer la unica Nancy de nutrición para ambrocio bebé arlen los primeros 6 meses de la rick, y puede continuar, con la introducción de comida, hasta y despues de 1 ano de edad. Informado sobre amaya consejos: Hay muchos beneficios de amamantar para el tim de la madre y del bebé. Mel los chupones, teteras, o botellas por lo menos por las primeras 4 semanas. Usar los chupones, teteras, o botellas pueden interumpir el proceso de amamantar.     Alimenta en cuanto hay muestras de hambre:  o Jesica en la boca, hacienda movimientos de succionar.  o Ruiditos suavecitos o estirarse  o Llorar es un signo de hambre tardío: no esperes a que el bebé llore.   Es de esperarse que haya 8-12 alimentaciones por 24 horas, aunque estas alimentaciones no sigan un horario definido.   Alterna el seno con el que empiezas, o empieza con el seno que se siente más lleno.   Cambia de lado cuando el bebé empiece a tragar más despacio o se desconecte del seno.   Esta teodora si el bebé no come del john seno en cada alimentación.   Si el bebé esta muy dormido o somnoliente: el contacto de piel a piel puede animarlo a empezar a comer:  o Quítale la camiseta y acuéstalo sobre tu pecho desnudo   Duerme cerca de tu bebé, aun en la casa. Aprende a aaron pecho acostada.   En la posición correcta los dos estarán cómodos:  o barbilla con seno, pecho con pecho  o Labio del bebé abierto hacia afuera para tragar: el labio del bebé debe estar volteado hacia afuera  o Boca abierta teodora yaima: la boca del bebé cubre la mayoría de la areola (el área oscura del seno)--no nada más el pezón   Fíjate en los signos de que hay transferencia de leche:  o Puedes oír al bebé tragar.  o No se oyen ruidos más o menos sosa jennifer clic.  o El bebé no demuestra que tiene más hambre después de la alimentación.  o El cuerpo del bebé y karina jesica están relajados por un tiempo corto.   Lo que entra, tiene que salir. Está pendiente de:  o Al cuarto día, por lo menos 3 cacas al día.  o La haylee cambia de izaiah a louise o café y luego a amarillo más líquido cuando baja tu leche.  o Después del cuarto día, por lo menos 6 paòales mojados/pesados al día.  o La orina debe ser de color amarillo roberto cuando baja tu leche.   Debes cristopher agua cuando tienes sed y comer alimentos sanos cuando tiene hambre. Joy siesta para descansar lo suficiente.    No tomes medicamentos o alcohol sino con el consejo de ambrocio  doctor. Puedes llamar al [facility]): ([phone]), [place], 8am-5pm, para obtener información sobre los medicamentos y la leche maternal.   La chano de seguimiento con la pediatra debe ser 2 días después de salir del hospital. El bebé deberia angi ganado el peso de nacimiento cuando tiene 10-14 días de rikc.

## 2020-06-22 ENCOUNTER — HOSPITAL ENCOUNTER (EMERGENCY)
Facility: HOSPITAL | Age: 25
Discharge: HOME | End: 2020-06-22
Payer: MEDICAID

## 2020-06-22 VITALS
HEART RATE: 73 BPM | DIASTOLIC BLOOD PRESSURE: 79 MMHG | OXYGEN SATURATION: 100 % | TEMPERATURE: 98 F | RESPIRATION RATE: 18 BRPM | SYSTOLIC BLOOD PRESSURE: 122 MMHG

## 2020-06-22 DIAGNOSIS — H10.32 ACUTE CONJUNCTIVITIS OF LEFT EYE, UNSPECIFIED ACUTE CONJUNCTIVITIS TYPE: Primary | ICD-10-CM

## 2020-06-22 DIAGNOSIS — R51.9 ACUTE NONINTRACTABLE HEADACHE, UNSPECIFIED HEADACHE TYPE: ICD-10-CM

## 2020-06-22 PROCEDURE — 25000003 PHARM REV CODE 250: Performed by: PHYSICIAN ASSISTANT

## 2020-06-22 PROCEDURE — 99283 EMERGENCY DEPT VISIT LOW MDM: CPT

## 2020-06-22 RX ORDER — ERYTHROMYCIN 5 MG/G
OINTMENT OPHTHALMIC
Status: COMPLETED | OUTPATIENT
Start: 2020-06-22 | End: 2020-06-22

## 2020-06-22 RX ORDER — ERYTHROMYCIN 5 MG/G
OINTMENT OPHTHALMIC
Qty: 1 TUBE | Refills: 0 | Status: ON HOLD | OUTPATIENT
Start: 2020-06-22 | End: 2023-01-13 | Stop reason: HOSPADM

## 2020-06-22 RX ORDER — ACETAMINOPHEN 325 MG/1
650 TABLET ORAL
Status: COMPLETED | OUTPATIENT
Start: 2020-06-22 | End: 2020-06-22

## 2020-06-22 RX ADMIN — ACETAMINOPHEN 650 MG: 325 TABLET ORAL at 10:06

## 2020-06-22 RX ADMIN — ERYTHROMYCIN 1 INCH: 5 OINTMENT OPHTHALMIC at 10:06

## 2020-06-23 NOTE — DISCHARGE INSTRUCTIONS
Apply warm compresses.  Do not rub your eyes.  Use prescribed antibiotic ointment as directed.  Take OTC Tylenol as needed for discomfort.  Follow up with PCP for continued care and management.

## 2020-06-23 NOTE — ED PROVIDER NOTES
Encounter Date: 6/22/2020       History     Chief Complaint   Patient presents with    Headache     with pain in left eye ( redness noted) HA began 3 days ago pt states she had vaginal birth 2 weeks ago     25-year-old female presenting to ED with complaints of left eye irritation, drainage, drainage redness with associated headache.  Patient reports headache is the left-sided achy.  Patient denies any fevers, sweats or chills.  Patient denies any contact lens or glasses use.  Patient does admit to mild blurred vision.  Patient reports no alleviating factors.  Patient admits that her eyelids are matted shut in the morning.  Patient admits to giving vaginal birth 2 weeks ago and denies any abdominal or gyn related complaints currently.  No other complaints at this time.    The history is provided by the patient. A  was used.     Review of patient's allergies indicates:  No Known Allergies  Past Medical History:   Diagnosis Date    Chronic anemia 6/10/2020    Diabetes mellitus      Past Surgical History:   Procedure Laterality Date    DILATION AND CURETTAGE OF UTERUS  04/2019    [place]     Family History   Problem Relation Age of Onset    Hypertension Father      Social History     Tobacco Use    Smoking status: Never Smoker    Smokeless tobacco: Never Used   Substance Use Topics    Alcohol use: Not Currently    Drug use: Never     Review of Systems   Constitutional: Negative for chills, diaphoresis, fatigue and fever.   HENT: Negative for congestion, ear pain, sinus pain, sore throat and trouble swallowing.    Eyes: Positive for photophobia, pain, discharge, redness and visual disturbance. Negative for itching.   Respiratory: Negative for cough, choking, chest tightness, shortness of breath, wheezing and stridor.    Cardiovascular: Negative for chest pain, palpitations and leg swelling.   Gastrointestinal: Negative for abdominal pain, diarrhea, nausea and vomiting.   Endocrine:  Negative.    Genitourinary: Negative for dysuria, flank pain and hematuria.   Musculoskeletal: Negative for back pain, myalgias and neck pain.   Skin: Negative.    Allergic/Immunologic: Negative.    Neurological: Negative for dizziness, seizures, weakness, light-headedness, numbness and headaches.   Hematological: Negative.    Psychiatric/Behavioral: Negative.        Physical Exam     Initial Vitals [06/22/20 1856]   BP Pulse Resp Temp SpO2   120/84 73 18 98.3 °F (36.8 °C) 99 %      MAP       --         Physical Exam    Nursing note and vitals reviewed.  Constitutional: Vital signs are normal. She appears well-developed and well-nourished. She is not diaphoretic. No distress.   [age] female sitting upright in no acute distress, nontoxic, AAO x4, breathing comfortably on room air, normal steady gait   HENT:   Head: Normocephalic and atraumatic.   Right Ear: Hearing and external ear normal.   Left Ear: Hearing and external ear normal.   Nose: Nose normal.   Mouth/Throat: Uvula is midline and oropharynx is clear and moist.   Eyes: EOM and lids are normal. Pupils are equal, round, and reactive to light. Lids are everted and swept, no foreign bodies found. No foreign body present in the right eye. Right conjunctiva is not injected. Right conjunctiva has no hemorrhage. Left conjunctiva is injected. Left conjunctiva has no hemorrhage.   5 mm equal round reactive pupils bilaterally, extraocular motions fully intact.  Right eye visual acuity 20/25.  Left eye visual acuity 20/30.  Left eye upper and lower eyelids normal although eyelashes are slightly matted.  Scant discharge noted.  Left eye conjunctival injections noted with no hemorrhage.  Concern for acute conjunctivitis.  No signs of periorbital cellulitis.   Neck: Trachea normal and normal range of motion. Neck supple.   Cardiovascular: Normal rate, regular rhythm, normal heart sounds and normal pulses.   Pulmonary/Chest: Effort normal. No accessory muscle usage.  No tachypnea. No respiratory distress.   Abdominal: Soft. She exhibits no distension. There is no abdominal tenderness. There is no rigidity, no rebound, no guarding and no CVA tenderness.   Musculoskeletal: Normal range of motion.   Neurological: She is alert and oriented to person, place, and time. She has normal strength. She displays no tremor. No sensory deficit. She exhibits normal muscle tone. She displays no seizure activity. Coordination normal. GCS score is 15. GCS eye subscore is 4. GCS verbal subscore is 5. GCS motor subscore is 6.   Skin: Skin is warm and dry. Capillary refill takes less than 2 seconds. No rash noted. No erythema.         ED Course   Procedures  Labs Reviewed - No data to display       Imaging Results    None          Medical Decision Making:   Initial Assessment:   25-year-old female presenting to ED with complaints of left eye irritation, drainage, drainage redness with associated headache.  Patient reports headache is the left-sided achy.  Patient denies any fevers, sweats or chills.  Patient denies any contact lens or glasses use.  Patient does admit to mild blurred vision.  Patient reports no alleviating factors.  Patient admits that her eyelids are matted shut in the morning.  Patient admits to giving vaginal birth 2 weeks ago and denies any abdominal or gyn related complaints currently.  No other complaints at this time.  Differential Diagnosis:   Conjunctivitis bacterial versus viral  Cellulitis  Headache  Sinusitis  ED Management:  Discussed care plan with patient using language line to ensure thorough understanding.  Patient with acute conjunctivitis.  Will be covered with erythromycin antibiotic ointment.  She was educated on warm compresses, not rubbing her eyes and close follow-up as well as ED return precautions.  Visual acuity acceptable.  Fully intact neurovascular exam.  She recently gave birth and has no complaints and regards that today.  Abdomen soft and nontender.   Vital signs stable.  All questions answered and patient is ready for discharge.                                 Clinical Impression:       ICD-10-CM ICD-9-CM   1. Acute conjunctivitis of left eye, unspecified acute conjunctivitis type  H10.32 372.00   2. Acute nonintractable headache, unspecified headache type  R51 784.0             ED Disposition Condition    Discharge Stable        ED Prescriptions     Medication Sig Dispense Start Date End Date Auth. Provider    erythromycin (ROMYCIN) ophthalmic ointment Place a 1/2 inch ribbon of ointment into the left lower eyelid 3x daily for 5 days. 1 Tube 6/22/2020  Hedy Shelton PA-C        Follow-up Information     Follow up With Specialties Details Why Contact Info    PRIMARY CARE MD  Schedule an appointment as soon as possible for a visit in 3 days If symptoms worsen     [facility] Emergency [facility] Go to  If symptoms worsen [address] [id number] [phone]                    PATIENT SEEN BY BRAD ONLY.                 Hedy Shelton PA-C  06/22/20 3583

## 2020-07-09 ENCOUNTER — POSTPARTUM VISIT (OUTPATIENT)
Dept: OBSTETRICS AND GYNECOLOGY | Facility: CLINIC | Age: 25
End: 2020-07-09

## 2020-07-09 VITALS
DIASTOLIC BLOOD PRESSURE: 78 MMHG | WEIGHT: 154.56 LBS | SYSTOLIC BLOOD PRESSURE: 110 MMHG | BODY MASS INDEX: 27.38 KG/M2

## 2020-07-09 PROCEDURE — 99999 PR PBB SHADOW E&M-EST. PATIENT-LVL III: ICD-10-PCS | Mod: PBBFAC,,, | Performed by: OBSTETRICS & GYNECOLOGY

## 2020-07-09 PROCEDURE — 99213 OFFICE O/P EST LOW 20 MIN: CPT | Mod: PBBFAC,PO | Performed by: OBSTETRICS & GYNECOLOGY

## 2020-07-09 PROCEDURE — 99999 PR PBB SHADOW E&M-EST. PATIENT-LVL III: CPT | Mod: PBBFAC,,, | Performed by: OBSTETRICS & GYNECOLOGY

## 2020-07-09 RX ORDER — NORGESTIMATE AND ETHINYL ESTRADIOL 0.25-0.035
1 KIT ORAL DAILY
Qty: 30 TABLET | Refills: 11 | Status: ON HOLD | OUTPATIENT
Start: 2020-07-09 | End: 2023-01-13 | Stop reason: HOSPADM

## 2020-09-09 ENCOUNTER — TELEPHONE (OUTPATIENT)
Dept: OBSTETRICS AND GYNECOLOGY | Facility: CLINIC | Age: 25
End: 2020-09-09

## 2020-09-09 NOTE — TELEPHONE ENCOUNTER
September 9, 2020     The  [id number] - DEVICE AUTHORIZATIONS     - [place] LEVONORGESTREL-RELEASING IUD (MIRENA 5YR) 52 MG    20109 (CPT®) - [place] INSERT INTRAUTERINE DEVICE order placed by Roland Bryant sapna Nava is now AUTHORIZED by  for 1 visit(s). Please schedule the patient before the authorization expiration date, 12/31/2020, and confirm the authorized referral (ID# [id number]) is linked to the visit.     For questions, send an In Basket message to the Pre Service Intake pool or call the [facility] Pre-Service department at [phone]. Pre-Service department hours are M-F 8am to 5pm.        Information for Referral # [id number]:        Diagnoses:    Z30.014 (ICD-10-CM) - Encounter for initial prescription of intrauterine contraceptive device (IUD)             Procedures:    DEVICE AUTHORIZATIONS [IQU219]    MN LEVONORGESTREL-RELEASING IUD (MIRENA 5YR) 52 MG []    MN INSERT INTRAUTERINE DEVICE [49973 (CPT®)]     Device:    Mirena

## 2021-05-24 PROBLEM — Z3A.39 39 WEEKS GESTATION OF PREGNANCY: Status: RESOLVED | Noted: 2020-06-08 | Resolved: 2021-05-24

## 2021-09-30 NOTE — PROGRESS NOTES
No complaints today   No nausea or vomiting   Denies vaginal bleeding or cramping     Prenatal labs reviewed  Aneuploidy screen  = Quad today   Anatomy   US at 20 weeks incomplete, will need @30w   Consents= signed   Diabetes screen= abnormal and abnormal 3hr GTT   Growth US=   TdaP= given   GBBS=  BC=     General Pregnancy  recommendations given  PNV + H2O intake    Did not go to Diabetes education   Started on her diet a few days ago  No doing the BS patterning the right way   ( it was virtual visit , and information got mixed up )   Will start doing it properly ,  fasting and 2 hrs after meals       Roland Bryant M.D.   OB/GYN       10 wk fibroid uterus, no ovaries. s/p salpingectomy. nl appendix. nl liver.

## 2022-05-24 ENCOUNTER — HOSPITAL ENCOUNTER (EMERGENCY)
Facility: HOSPITAL | Age: 27
Discharge: HOME | End: 2022-05-24
Attending: EMERGENCY MEDICINE
Payer: MEDICAID

## 2022-05-24 VITALS
HEART RATE: 63 BPM | DIASTOLIC BLOOD PRESSURE: 68 MMHG | OXYGEN SATURATION: 100 % | HEIGHT: 65 IN | RESPIRATION RATE: 20 BRPM | TEMPERATURE: 99 F | WEIGHT: 152 LBS | SYSTOLIC BLOOD PRESSURE: 110 MMHG | BODY MASS INDEX: 25.33 KG/M2

## 2022-05-24 DIAGNOSIS — Z3A.01 LESS THAN 8 WEEKS GESTATION OF PREGNANCY: ICD-10-CM

## 2022-05-24 DIAGNOSIS — G44.009 CLUSTER HEADACHE, NOT INTRACTABLE, UNSPECIFIED CHRONICITY PATTERN: Primary | ICD-10-CM

## 2022-05-24 LAB
B-HCG UR QL: POSITIVE
BILIRUB UR QL STRIP: NEGATIVE
CLARITY UR: CLEAR
COLOR UR: YELLOW
CTP QC/QA: YES
GLUCOSE UR QL STRIP: NEGATIVE
HGB UR QL STRIP: NEGATIVE
INFLUENZA A, MOLECULAR: NEGATIVE
INFLUENZA B, MOLECULAR: NEGATIVE
KETONES UR QL STRIP: NEGATIVE
LEUKOCYTE ESTERASE UR QL STRIP: ABNORMAL
MICROSCOPIC COMMENT: NORMAL
NITRITE UR QL STRIP: NEGATIVE
PH UR STRIP: 7 [PH] (ref 5–8)
PROT UR QL STRIP: NEGATIVE
RBC #/AREA URNS HPF: 0 /HPF (ref 0–4)
SARS-COV-2 RDRP RESP QL NAA+PROBE: NEGATIVE
SP GR UR STRIP: 1.02 (ref 1–1.03)
SPECIMEN SOURCE: NORMAL
SQUAMOUS #/AREA URNS HPF: 2 /HPF
URN SPEC COLLECT METH UR: ABNORMAL
UROBILINOGEN UR STRIP-ACNC: NEGATIVE EU/DL
WBC #/AREA URNS HPF: 2 /HPF (ref 0–5)

## 2022-05-24 PROCEDURE — U0002 COVID-19 LAB TEST NON-CDC: HCPCS | Performed by: EMERGENCY MEDICINE

## 2022-05-24 PROCEDURE — 99284 EMERGENCY DEPT VISIT MOD MDM: CPT | Mod: 25

## 2022-05-24 PROCEDURE — 87502 INFLUENZA DNA AMP PROBE: CPT | Performed by: STUDENT IN AN ORGANIZED HEALTH CARE EDUCATION/TRAINING PROGRAM

## 2022-05-24 PROCEDURE — 81000 URINALYSIS NONAUTO W/SCOPE: CPT | Performed by: EMERGENCY MEDICINE

## 2022-05-24 PROCEDURE — 96374 THER/PROPH/DIAG INJ IV PUSH: CPT

## 2022-05-24 PROCEDURE — 96361 HYDRATE IV INFUSION ADD-ON: CPT

## 2022-05-24 PROCEDURE — 25000003 PHARM REV CODE 250: Performed by: STUDENT IN AN ORGANIZED HEALTH CARE EDUCATION/TRAINING PROGRAM

## 2022-05-24 PROCEDURE — 63600175 PHARM REV CODE 636 W HCPCS: Performed by: STUDENT IN AN ORGANIZED HEALTH CARE EDUCATION/TRAINING PROGRAM

## 2022-05-24 PROCEDURE — 81025 URINE PREGNANCY TEST: CPT | Performed by: EMERGENCY MEDICINE

## 2022-05-24 RX ORDER — DIPHENHYDRAMINE HYDROCHLORIDE 50 MG/ML
12.5 INJECTION INTRAMUSCULAR; INTRAVENOUS
Status: DISCONTINUED | OUTPATIENT
Start: 2022-05-24 | End: 2022-05-24

## 2022-05-24 RX ORDER — METOCLOPRAMIDE HYDROCHLORIDE 5 MG/ML
10 INJECTION INTRAMUSCULAR; INTRAVENOUS
Status: COMPLETED | OUTPATIENT
Start: 2022-05-24 | End: 2022-05-24

## 2022-05-24 RX ORDER — ACETAMINOPHEN 500 MG
1000 TABLET ORAL
Status: COMPLETED | OUTPATIENT
Start: 2022-05-24 | End: 2022-05-24

## 2022-05-24 RX ORDER — DOXYLAMINE SUCCINATE AND PYRIDOXINE HYDROCHLORIDE, DELAYED RELEASE TABLETS 10 MG/10 MG 10; 10 MG/1; MG/1
2 TABLET, DELAYED RELEASE ORAL NIGHTLY
Qty: 14 TABLET | Refills: 0 | Status: SHIPPED | OUTPATIENT
Start: 2022-05-24 | End: 2022-05-31

## 2022-05-24 RX ORDER — PROCHLORPERAZINE EDISYLATE 5 MG/ML
10 INJECTION INTRAMUSCULAR; INTRAVENOUS
Status: DISCONTINUED | OUTPATIENT
Start: 2022-05-24 | End: 2022-05-24

## 2022-05-24 RX ADMIN — METOCLOPRAMIDE 10 MG: 5 INJECTION, SOLUTION INTRAMUSCULAR; INTRAVENOUS at 04:05

## 2022-05-24 RX ADMIN — ACETAMINOPHEN 1000 MG: 500 TABLET ORAL at 03:05

## 2022-05-24 RX ADMIN — SODIUM CHLORIDE 2000 ML: 0.9 INJECTION, SOLUTION INTRAVENOUS at 04:05

## 2022-05-24 NOTE — PLAN OF CARE
SW notified of pt's visit to ED, and pt is self pay. CM requests PCP resources for pt to follow up. SW added to After Visit Summary as well as other resources for support.    Sliding Scale PCP Yakut-speaking  [facility]  COVID-19 info: accesshealthla.org  Located in: [facility]  Address: [address] [place] [address]  Phone: [phone]      Community Resource: Financial Assistance, COVID testing, resources for families   https://ovnv.org/resource-hub  [facility] ([facility])  Non-profit organization in [place], [place]  Address: [address] [place] [address]  Phone: [phone]       05/24/22 1502   Post-Acute Status   Hospital Resources/Appts/Education Provided Community resources provided;Post-Acute resouces added to AVS

## 2022-05-24 NOTE — ED PROVIDER NOTES
Encounter Date: 5/24/2022       History     Chief Complaint   Patient presents with    Headache     Headache vomiting and dizziness. Last dose of tylenol this am 8am. Chills, tired and body aches noted.      Patient is a 27-year-old female with no significant past medical history presenting to the emergency department for headache, subjective fevers, intermittent nausea and NBNB vomiting x1 week.  She describes the headache as left-sided, with a sensation that is behind her eye.  She has had associated redness and tearing.  No eye trauma, no vision changes.  She denies abdominal pain, vaginal bleeding, vaginal discharge or dysuria.  She further denies chest pain, shortness of breath or focal weakness.    The history is provided by the patient. The history is limited by a language barrier. A  was used (Tyree Perea DO).     Review of patient's allergies indicates:  No Known Allergies  Past Medical History:   Diagnosis Date    Chronic anemia 6/10/2020    Diabetes mellitus      Past Surgical History:   Procedure Laterality Date    DILATION AND CURETTAGE OF UTERUS  04/2019    [place]     Family History   Problem Relation Age of Onset    Hypertension Father      Social History     Tobacco Use    Smoking status: Never Smoker    Smokeless tobacco: Never Used   Substance Use Topics    Alcohol use: Not Currently    Drug use: Never     Review of Systems   Constitutional: Positive for fever (subjective). Negative for activity change and chills.   HENT: Negative for congestion, ear pain and sore throat.    Eyes: Positive for pain and redness. Negative for visual disturbance.   Respiratory: Negative for shortness of breath and stridor.    Cardiovascular: Negative for chest pain and palpitations.   Gastrointestinal: Positive for nausea and vomiting. Negative for abdominal pain.   Genitourinary: Negative for dysuria.   Musculoskeletal: Negative for back pain.   Skin: Negative for rash.    Neurological: Positive for headaches. Negative for dizziness, syncope and weakness.   Hematological: Does not bruise/bleed easily.       Physical Exam     Initial Vitals [05/24/22 1432]   BP Pulse Resp Temp SpO2   110/68 82 20 99.2 °F (37.3 °C) 100 %      MAP       --         Physical Exam    Nursing note and vitals reviewed.  Constitutional: She appears well-developed and well-nourished. She is not diaphoretic. No distress.   Uncomfortable appearing.  Speaking full sentences.  No acute distress.   HENT:   Head: Normocephalic and atraumatic.   Right Ear: External ear normal.   Left Ear: External ear normal.   Eyes: Left conjunctiva is injected.   Left conjunctival injection with tearing   Neck: Neck supple.   Cardiovascular: Normal rate, regular rhythm, normal heart sounds and intact distal pulses.   Pulmonary/Chest: Breath sounds normal. No respiratory distress. She has no wheezes. She has no rhonchi. She has no rales.   Abdominal: Abdomen is soft. She exhibits no distension. There is no abdominal tenderness. There is no rebound and no guarding.   Musculoskeletal:      Cervical back: Neck supple.     Neurological: She is alert and oriented to person, place, and time. GCS score is 15. GCS eye subscore is 4. GCS verbal subscore is 5. GCS motor subscore is 6.   Skin: Skin is warm. Capillary refill takes less than 2 seconds. No rash noted.   Psychiatric: She has a normal mood and affect.         ED Course   Procedures  Labs Reviewed   POCT URINE PREGNANCY - Abnormal; Notable for the following components:       Result Value    POC Preg Test, Ur Positive (*)     All other components within normal limits   INFLUENZA A & B BY MOLECULAR   SARS-COV-2 RNA AMPLIFICATION, QUAL   URINALYSIS, REFLEX TO URINE CULTURE   URINALYSIS MICROSCOPIC          Imaging Results    None          Medications   sodium chloride 0.9% bolus 1,000 mL (2,000 mLs Intravenous New Bag 5/24/22 1603)   acetaminophen tablet 1,000 mg (1,000 mg Oral Given  5/24/22 1554)   metoclopramide HCl injection 10 mg (10 mg Intravenous Given 5/24/22 1606)     Medical Decision Making:   History:   Old Medical Records: I decided to obtain old medical records.  Initial Assessment:   Emergent evaluation of a headache, nausea and vomiting.  She is afebrile and hemodynamically stable.  Differential Diagnosis:   Migraine vs cluster headache, COVID vs other viral syndrome, pregnancy, UTI  Clinical Tests:   Lab Tests: Reviewed  ED Management:  History and physical exam is consistent with a cluster headache given her conjunctival injection, tearing and temporal/retro-orbital headache.  She was given Tylenol as well as Reglan infused IVF. Also placed on 2 L nasal cannula for cluster headache treatment. On reassessment, she is feeling much improved. She was signed out to the oncoming resident pending a UA. Made aware of her pregnancy. Given follow-up for OB. Further discussed ED return precautions, and she expressed understanding.             ED Course as of 05/24/22 1646   Tue May 24, 2022   1514 Preg Test, Ur(!): Positive [AS]      ED Course User Index  [AS] Tyree Perea MD             Clinical Impression:   Final diagnoses:  [G44.009] Cluster headache, not intractable, unspecified chronicity pattern (Primary)  [Z3A.01] Less than 8 weeks gestation of pregnancy          ED Disposition Condition    Discharge Stable        ED Prescriptions     Medication Sig Dispense Start Date End Date Auth. Provider    doxylamine-pyridoxine, vit B6, (DICLEGIS) 10-10 mg TbEC Take 2 tablets by mouth every evening. for 7 days 14 tablet 5/24/2022 5/31/2022 Tyree Perea MD        Follow-up Information     Follow up With Specialties Details Why Contact Info    [facility]  Follow up As needed Community Resource: Financial Assistance, COVID testing, resources for families   https://ovnv.org/resource-hub    Address: [address] [place] [address]  Phone: [phone]    Yenny  [facility]  Follow up As needed to establish PCP Sliding Scale PCP Chinese-speaking    COVID-19 info: [email or web address]  Located in: [facility]  Address: [address] [place] [address]  Phone: [phone]    Roland Bryant MD Obstetrics and Gynecology Schedule an appointment as soon as possible for a visit   [address]  [address] [place] [address] [phone]             Tyree Perea MD  Resident  05/24/22 8544

## 2022-05-24 NOTE — ED TRIAGE NOTES
Pt reports to the ED with a complaint of fever, HA and vomiting that started this morning. LMP 4/15        Patient identifiers verified and correct.     APPEARANCE: Patient not in acute distress.  NEURO: Awake, alert, appropriate for age, condition, and situation, pupils equal, round, and reactive.   HEENT: Head symmetrical. Eyes bilateral.  Bilateral ears without drainage. Bilateral nares patent, throat clear.  CARDIAC: Regular rate and rhythm  RESPIRATORY: Airway is open and patent. Respirations are normal and spontaneous on room air.   NEUROVASCULAR: All extremities are warm and pink. .  MUSCULOSKELETAL: Moves all extremities.   SKIN: Warm and dry, adequate turgor, mucus membranes moist and pink; no breakdown, lesions, or ecchymosis noted.     Will continue to monitor.

## 2022-05-25 ENCOUNTER — TELEPHONE (OUTPATIENT)
Dept: OBSTETRICS AND GYNECOLOGY | Facility: CLINIC | Age: 27
End: 2022-05-25
Payer: MEDICAID

## 2022-05-25 NOTE — TELEPHONE ENCOUNTER
----- Message from Michelle Fernandes sent at 5/25/2022  2:39 PM CDT -----  Good afternoon,    The patient has a referral from the emergency department with a diagnosis of Less than 8 weeks gestation of pregnancy. Please assist with scheduling the patient?    Thank you

## 2022-06-03 ENCOUNTER — HOSPITAL ENCOUNTER (EMERGENCY)
Facility: HOSPITAL | Age: 27
Discharge: HOME | End: 2022-06-03
Attending: EMERGENCY MEDICINE
Payer: MEDICAID

## 2022-06-03 VITALS
RESPIRATION RATE: 16 BRPM | DIASTOLIC BLOOD PRESSURE: 69 MMHG | OXYGEN SATURATION: 99 % | SYSTOLIC BLOOD PRESSURE: 120 MMHG | BODY MASS INDEX: 25.29 KG/M2 | HEART RATE: 76 BPM | WEIGHT: 152 LBS | TEMPERATURE: 100 F

## 2022-06-03 DIAGNOSIS — O20.0 THREATENED MISCARRIAGE IN EARLY PREGNANCY: Primary | ICD-10-CM

## 2022-06-03 LAB
ANION GAP SERPL CALC-SCNC: 12 MMOL/L (ref 8–16)
B-HCG UR QL: POSITIVE
BASOPHILS # BLD AUTO: 0.06 K/UL (ref 0–0.2)
BASOPHILS NFR BLD: 0.6 % (ref 0–1.9)
BUN SERPL-MCNC: 7 MG/DL (ref 6–20)
CALCIUM SERPL-MCNC: 9.4 MG/DL (ref 8.7–10.5)
CHLORIDE SERPL-SCNC: 105 MMOL/L (ref 95–110)
CO2 SERPL-SCNC: 21 MMOL/L (ref 23–29)
CREAT SERPL-MCNC: 0.7 MG/DL (ref 0.5–1.4)
CTP QC/QA: YES
DIFFERENTIAL METHOD: ABNORMAL
EOSINOPHIL # BLD AUTO: 0.3 K/UL (ref 0–0.5)
EOSINOPHIL NFR BLD: 2.5 % (ref 0–8)
ERYTHROCYTE [DISTWIDTH] IN BLOOD BY AUTOMATED COUNT: 13.2 % (ref 11.5–14.5)
EST. GFR  (AFRICAN AMERICAN): >60 ML/MIN/1.73 M^2
EST. GFR  (NON AFRICAN AMERICAN): >60 ML/MIN/1.73 M^2
GLUCOSE SERPL-MCNC: 104 MG/DL (ref 70–110)
HCG INTACT+B SERPL-ACNC: NORMAL MIU/ML
HCT VFR BLD AUTO: 35.7 % (ref 37–48.5)
HGB BLD-MCNC: 12 G/DL (ref 12–16)
IMM GRANULOCYTES # BLD AUTO: 0.03 K/UL (ref 0–0.04)
IMM GRANULOCYTES NFR BLD AUTO: 0.3 % (ref 0–0.5)
LYMPHOCYTES # BLD AUTO: 3.5 K/UL (ref 1–4.8)
LYMPHOCYTES NFR BLD: 34 % (ref 18–48)
MCH RBC QN AUTO: 30.2 PG (ref 27–31)
MCHC RBC AUTO-ENTMCNC: 33.6 G/DL (ref 32–36)
MCV RBC AUTO: 90 FL (ref 82–98)
MONOCYTES # BLD AUTO: 0.5 K/UL (ref 0.3–1)
MONOCYTES NFR BLD: 4.9 % (ref 4–15)
NEUTROPHILS # BLD AUTO: 6 K/UL (ref 1.8–7.7)
NEUTROPHILS NFR BLD: 57.7 % (ref 38–73)
NRBC BLD-RTO: 0 /100 WBC
PLATELET # BLD AUTO: 270 K/UL (ref 150–450)
PMV BLD AUTO: 10.1 FL (ref 9.2–12.9)
POTASSIUM SERPL-SCNC: 3.8 MMOL/L (ref 3.5–5.1)
RBC # BLD AUTO: 3.98 M/UL (ref 4–5.4)
SODIUM SERPL-SCNC: 138 MMOL/L (ref 136–145)
WBC # BLD AUTO: 10.34 K/UL (ref 3.9–12.7)

## 2022-06-03 PROCEDURE — 99284 EMERGENCY DEPT VISIT MOD MDM: CPT | Mod: 25

## 2022-06-03 PROCEDURE — 80048 BASIC METABOLIC PNL TOTAL CA: CPT | Performed by: EMERGENCY MEDICINE

## 2022-06-03 PROCEDURE — 85025 COMPLETE CBC W/AUTO DIFF WBC: CPT | Performed by: EMERGENCY MEDICINE

## 2022-06-03 PROCEDURE — 84702 CHORIONIC GONADOTROPIN TEST: CPT | Performed by: EMERGENCY MEDICINE

## 2022-06-03 PROCEDURE — 25000003 PHARM REV CODE 250: Performed by: EMERGENCY MEDICINE

## 2022-06-03 PROCEDURE — 81025 URINE PREGNANCY TEST: CPT | Performed by: EMERGENCY MEDICINE

## 2022-06-03 RX ORDER — ACETAMINOPHEN 500 MG
1000 TABLET ORAL
Status: COMPLETED | OUTPATIENT
Start: 2022-06-03 | End: 2022-06-03

## 2022-06-03 RX ADMIN — ACETAMINOPHEN 1000 MG: 500 TABLET ORAL at 03:06

## 2022-06-03 NOTE — ED PROVIDER NOTES
CHIEF COMPLAINT:  Vaginal bleeding    HPI:     Hoa Nava 27 y.o.  with a PMH of chronic anemia and DM who presents to the emergency department today with vaginal bleeding. Had a + UPT, believes she is 5-6 weeks pregnant. Tonight started with lower abdominal pain and back pain along with vaginal bleeding. No unilateral pain.  No change in urination. No fever, chills or sweats.    ROS    Constitutional: No fever, no chills.  Eyes: No discharge. No pain.  HENT: No nasal drainage. No ear ache. No sore throat.  Cardiovascular: No chest pain, no palpitations.  Respiratory: No cough, no shortness of breath.  Gastrointestinal: No abdominal pain, no vomiting. No diarrhea.  Genitourinary: No hematuria, dysuria, urgency.  Musculoskeletal: No back pain.  Skin: No rashes, no lesions.  Neurological: No headache, no focal weakness.    History obtained from pt.     Past Medical History:   Diagnosis Date    Chronic anemia 6/10/2020    Diabetes mellitus        Past Surgical History:   Procedure Laterality Date    DILATION AND CURETTAGE OF UTERUS  2019    [place]       Social History     Socioeconomic History    Marital status: Single   Tobacco Use    Smoking status: Never Smoker    Smokeless tobacco: Never Used   Substance and Sexual Activity    Alcohol use: Not Currently    Drug use: Never    Sexual activity: Yes     Partners: Male     Birth control/protection: None       Family History   Problem Relation Age of Onset    Hypertension Father            Physical Exam  /69 (BP Location: Left arm, Patient Position: Sitting)   Pulse 76   Temp 99.5 °F (37.5 °C) (Oral)   Resp 16   Wt 68.9 kg (152 lb)   LMP 04/15/2022   SpO2 99%   BMI 25.29 kg/m²   Nursing notes reviewed  General Appearance:  This is a well-developed individual who is in no acute distress.  Lying in bed but able to sit up on their own without any difficulty.  HEENT:  Head:  Normocephalic, atraumatic.      Eyes: Pupils equal  and round no pallor or injection. Extra ocular movements intact.      Mouth: Mucous membranes are moist. Oropharynx clear.  Neck: Neck is supple, non-tender. No lymphadenopathy.  No stridor.   Respiratory: There are no retractions, lungs are clear to auscultation.  No wheezing.  No crackles.  Cardiovascular: Regular rate and rhythm. No murmurs, rubs or gallops.  Gastrointestinal:  Abdomen is soft and non-tender, no masses, no pulsatile masses, bowel sounds normal.  Neurological: Alert and oriented x 4. CN II-XII grossly intact. No focal weakness. Strength intact 5/5 bilaterally in upper and lower extremities.   Skin: Warm and dry, no rashes.  Musculoskeletal:  No obvious deformities.  Extremities are non-tender, non-swollen and have full range of motion.  Back nontender to palpation.      DIFFERENTIAL DIAGNOSIS: After history and physical exam a differential diagnosis was considered, but was not limited to ectopic, miscarriage, menstruation, ovarian cyst, pelvic inflammatory disease, ovarian torsion, urinary tract infection, and appendicitis.    Initial:  Hoa Nava presents to the emergency department today with vaginal bleeding.  She has a positive UPT.  Will go ahead and obtained labs and a beta HCG, if beta HCG is appropriate will obtain ultrasound.        I independently reviewed the labs: UPT +, bHCG 57714  , Rh , CBC unremarable, CMP unremarkable, UA unremarkable         ED Course as of 06/04/22 1739 Fri Jun 03, 2022   0255 Preg Test, Ur(!): Positive [JA]   0429 HCG Quant: [id number]  Will obtain ultrasound   [JA]      ED Course User Index  [JA] Katerin Carcamo MD           MDM    Repeat Assessment: Pt resting comfortably. I discussed the results of the workup with the pt. They are feeling improved. They understand the need for follow up.   Repeat Exam: Benign     MDM: Hoa Nava presents to the ED today with vaginal bleeding.  + UPT with bHCG >86256.  She is awaiting US. Pt has been  turned over to Dr Baca at shift change awaiting results of ultrasound. He will make the final disposition. She is stable at this time.       Voice recognition software utilized in this note.      The encounter diagnosis was Threatened miscarriage in early pregnancy.      Discharge Medication List as of 6/3/2022  6:33 AM         Follow-up Information     Schedule an appointment as soon as possible for a visit  with Roland Bryant MD.    Specialty: Obstetrics and Gynecology  Contact information:  [address]  [address]  [facility] [address]  [phone]             [place] - Emergency Dept.    Specialty: Emergency Medicine  Why: If symptoms worsen  Contact information:  [address] [facility] [id number] [phone]                          Katerin Carcamo MD  06/04/22 4145

## 2022-06-03 NOTE — PROGRESS NOTES
Care of patient accepted from Dr. Carcamo at 6:00 a.m. shift change awaiting ultrasound results.  Ultrasound shows a single live intrauterine gestation at 7 weeks.  Patient will be supplied with discharge instructions concerning vaginal bleeding in early pregnancy.  She will follow up with Dr. Bryant as soon as able, but may return to the ED for any possible worsening.

## 2022-06-03 NOTE — ED NOTES
Patient identifiers for Hoa Nava checked and correct.  LOC: The patient is awake, alert and aware of environment with an appropriate affect, the patient is oriented x 3 and speaking appropriately.  APPEARANCE: Patient resting quietly and in no acute distress, patient is clean and well groomed, patient's clothing are properly fastened.  SKIN: The skin is warm and dry, patient has normal skin turgor and moist mucus membranes, skin intact, no breakdown or brusing noted.  MUSKULOSKELETAL: Patient moving all extremities well, no obvious swelling or deformities noted.  RESPIRATORY: Airway is open and patent, respirations are spontaneous, patient has a normal effort and rate.  ABDOMEN: Soft and  tender to palpation.

## 2022-06-13 NOTE — PROGRESS NOTES
CC: Post-partum follow-up    Hoa Nava is a 25 y.o. female  who presents for post-partum visit.  She is S/P a .  She and the baby are doing well.  No pain.  No fever.   No bowel / bladder complaints.    Delivery Date: 20  Delivery MD: Manny  Breast Feeding: YES  Depression: NO  Contraception: no method    Pregnancy was complicated by:  Gestational  Diabetes     /78   Wt 70.1 kg (154 lb 8.7 oz)   LMP 09/10/2019 (Exact Date)   Breastfeeding Yes   BMI 27.38 kg/m²     ROS:  GENERAL: No fever, chills, fatigability.  VULVAR: No pain, no lesions and no itching.  VAGINAL: No relaxation, no itching, no discharge, no abnormal bleeding and no lesions.  ABDOMEN: No abdominal pain. Denies nausea. Denies vomiting. No diarrhea. No constipation  BREAST: Denies pain. No lumps. No discharge.  URINARY: No incontinence, no nocturia, no frequency and no dysuria.  CARDIOVASCULAR: No chest pain. No shortness of breath. No leg cramps.  NEUROLOGICAL: No headaches. No vision changes.    PHYSICAL EXAM:  ABDOMEN:  Soft, non-tender, non-distended  VULVA:  Normal, no lesions  CERVIX:  Without lesions, polyps or tenderness.  UTERUS:  Normal size, shape, consistency, no mass or tenderness.  ADNEXA:  Normal in size without mass or tenderness    IMP:  Doing well S/P   Instructions / precautions reviewed  Contraceptive counseling  Desires OCP  The use of the oral contraceptive has been fully discussed with the patient. This includes the proper method to initiate and continue the pills, the need for regular compliance to ensure adequate contraceptive effect, the physiology which make the pill effective, the instructions for what to do in event of a missed pill, and warnings about anticipated minor side effects such as breakthrough spotting, nausea, breast tenderness, weight changes, acne, headaches, etc.  She has been told of the more serious potential side effects such as MI, stroke, and deep vein thrombosis,  all of which are very unlikely.  She has been asked to report any signs of such serious problems immediately.  She should back up the pill with a condom during any cycle in which antibiotics are prescribed, and during the first cycle as well. The need for additional protection, such as a condom, to prevent exposure to sexually transmitted diseases has also been discussed- the patient has been clearly reminded that OCP's cannot protect her against diseases such as HIV and others. She understands and wishes to take the medication as prescribed.        PLAN:  May resume normal activities  Return: for annual visit or PRN       Roland Bryant M.D.   OB/GYN           None

## 2022-06-17 ENCOUNTER — OFFICE VISIT (OUTPATIENT)
Dept: OBSTETRICS AND GYNECOLOGY | Facility: CLINIC | Age: 27
End: 2022-06-17
Payer: MEDICAID

## 2022-06-17 ENCOUNTER — LAB VISIT (OUTPATIENT)
Dept: LAB | Facility: HOSPITAL | Age: 27
End: 2022-06-17
Attending: OBSTETRICS & GYNECOLOGY
Payer: MEDICAID

## 2022-06-17 VITALS
WEIGHT: 150.13 LBS | SYSTOLIC BLOOD PRESSURE: 101 MMHG | BODY MASS INDEX: 24.98 KG/M2 | DIASTOLIC BLOOD PRESSURE: 64 MMHG

## 2022-06-17 DIAGNOSIS — Z3A.09 9 WEEKS GESTATION OF PREGNANCY: ICD-10-CM

## 2022-06-17 DIAGNOSIS — Z3A.09 9 WEEKS GESTATION OF PREGNANCY: Primary | ICD-10-CM

## 2022-06-17 DIAGNOSIS — Z3A.01 LESS THAN 8 WEEKS GESTATION OF PREGNANCY: ICD-10-CM

## 2022-06-17 LAB
ABO + RH BLD: NORMAL
ANION GAP SERPL CALC-SCNC: 11 MMOL/L (ref 8–16)
BASOPHILS # BLD AUTO: 0.05 K/UL (ref 0–0.2)
BASOPHILS NFR BLD: 0.5 % (ref 0–1.9)
BLD GP AB SCN CELLS X3 SERPL QL: NORMAL
BUN SERPL-MCNC: 8 MG/DL (ref 6–20)
CALCIUM SERPL-MCNC: 9 MG/DL (ref 8.7–10.5)
CHLORIDE SERPL-SCNC: 106 MMOL/L (ref 95–110)
CO2 SERPL-SCNC: 20 MMOL/L (ref 23–29)
CREAT SERPL-MCNC: 0.7 MG/DL (ref 0.5–1.4)
DIFFERENTIAL METHOD: ABNORMAL
EOSINOPHIL # BLD AUTO: 0.1 K/UL (ref 0–0.5)
EOSINOPHIL NFR BLD: 1.3 % (ref 0–8)
ERYTHROCYTE [DISTWIDTH] IN BLOOD BY AUTOMATED COUNT: 13.8 % (ref 11.5–14.5)
EST. GFR  (AFRICAN AMERICAN): >60 ML/MIN/1.73 M^2
EST. GFR  (NON AFRICAN AMERICAN): >60 ML/MIN/1.73 M^2
ESTIMATED AVG GLUCOSE: 123 MG/DL (ref 68–131)
GLUCOSE SERPL-MCNC: 98 MG/DL (ref 70–110)
HBA1C MFR BLD: 5.9 % (ref 4–5.6)
HCT VFR BLD AUTO: 34.6 % (ref 37–48.5)
HGB BLD-MCNC: 11.9 G/DL (ref 12–16)
IMM GRANULOCYTES # BLD AUTO: 0.04 K/UL (ref 0–0.04)
IMM GRANULOCYTES NFR BLD AUTO: 0.4 % (ref 0–0.5)
LYMPHOCYTES # BLD AUTO: 2.1 K/UL (ref 1–4.8)
LYMPHOCYTES NFR BLD: 20.5 % (ref 18–48)
MCH RBC QN AUTO: 30.4 PG (ref 27–31)
MCHC RBC AUTO-ENTMCNC: 34.4 G/DL (ref 32–36)
MCV RBC AUTO: 88 FL (ref 82–98)
MONOCYTES # BLD AUTO: 0.5 K/UL (ref 0.3–1)
MONOCYTES NFR BLD: 4.8 % (ref 4–15)
NEUTROPHILS # BLD AUTO: 7.3 K/UL (ref 1.8–7.7)
NEUTROPHILS NFR BLD: 72.5 % (ref 38–73)
NRBC BLD-RTO: 0 /100 WBC
PLATELET # BLD AUTO: 254 K/UL (ref 150–450)
PMV BLD AUTO: 10.3 FL (ref 9.2–12.9)
POTASSIUM SERPL-SCNC: 3.8 MMOL/L (ref 3.5–5.1)
RBC # BLD AUTO: 3.92 M/UL (ref 4–5.4)
SODIUM SERPL-SCNC: 137 MMOL/L (ref 136–145)
WBC # BLD AUTO: 10.06 K/UL (ref 3.9–12.7)

## 2022-06-17 PROCEDURE — 83020 HEMOGLOBIN ELECTROPHORESIS: CPT | Performed by: OBSTETRICS & GYNECOLOGY

## 2022-06-17 PROCEDURE — 80074 ACUTE HEPATITIS PANEL: CPT | Performed by: OBSTETRICS & GYNECOLOGY

## 2022-06-17 PROCEDURE — 88141 CYTOPATH C/V INTERPRET: CPT | Mod: ,,, | Performed by: PATHOLOGY

## 2022-06-17 PROCEDURE — 1159F MED LIST DOCD IN RCRD: CPT | Mod: CPTII,,, | Performed by: OBSTETRICS & GYNECOLOGY

## 2022-06-17 PROCEDURE — 99999 PR PBB SHADOW E&M-EST. PATIENT-LVL III: CPT | Mod: PBBFAC,,, | Performed by: OBSTETRICS & GYNECOLOGY

## 2022-06-17 PROCEDURE — 99213 OFFICE O/P EST LOW 20 MIN: CPT | Mod: PBBFAC,PO | Performed by: OBSTETRICS & GYNECOLOGY

## 2022-06-17 PROCEDURE — 3074F SYST BP LT 130 MM HG: CPT | Mod: CPTII,,, | Performed by: OBSTETRICS & GYNECOLOGY

## 2022-06-17 PROCEDURE — 30000890 HC MISC. SEND OUT TEST

## 2022-06-17 PROCEDURE — 85025 COMPLETE CBC W/AUTO DIFF WBC: CPT | Performed by: OBSTETRICS & GYNECOLOGY

## 2022-06-17 PROCEDURE — 86762 RUBELLA ANTIBODY: CPT | Performed by: OBSTETRICS & GYNECOLOGY

## 2022-06-17 PROCEDURE — 86592 SYPHILIS TEST NON-TREP QUAL: CPT | Performed by: OBSTETRICS & GYNECOLOGY

## 2022-06-17 PROCEDURE — 87077 CULTURE AEROBIC IDENTIFY: CPT | Performed by: OBSTETRICS & GYNECOLOGY

## 2022-06-17 PROCEDURE — 86901 BLOOD TYPING SEROLOGIC RH(D): CPT | Performed by: OBSTETRICS & GYNECOLOGY

## 2022-06-17 PROCEDURE — 1160F RVW MEDS BY RX/DR IN RCRD: CPT | Mod: CPTII,,, | Performed by: OBSTETRICS & GYNECOLOGY

## 2022-06-17 PROCEDURE — 36415 COLL VENOUS BLD VENIPUNCTURE: CPT | Performed by: OBSTETRICS & GYNECOLOGY

## 2022-06-17 PROCEDURE — 3008F BODY MASS INDEX DOCD: CPT | Mod: CPTII,,, | Performed by: OBSTETRICS & GYNECOLOGY

## 2022-06-17 PROCEDURE — 99203 OFFICE O/P NEW LOW 30 MIN: CPT | Mod: S$PBB,TH,, | Performed by: OBSTETRICS & GYNECOLOGY

## 2022-06-17 PROCEDURE — 83021 HEMOGLOBIN CHROMOTOGRAPHY: CPT

## 2022-06-17 PROCEDURE — 87186 SC STD MICRODIL/AGAR DIL: CPT | Performed by: OBSTETRICS & GYNECOLOGY

## 2022-06-17 PROCEDURE — 88141 PR  CYTOPATH CERV/VAG INTERPRET: ICD-10-PCS | Mod: ,,, | Performed by: PATHOLOGY

## 2022-06-17 PROCEDURE — 3078F DIAST BP <80 MM HG: CPT | Mod: CPTII,,, | Performed by: OBSTETRICS & GYNECOLOGY

## 2022-06-17 PROCEDURE — 87481 CANDIDA DNA AMP PROBE: CPT | Mod: 59 | Performed by: OBSTETRICS & GYNECOLOGY

## 2022-06-17 PROCEDURE — 87801 DETECT AGNT MULT DNA AMPLI: CPT | Performed by: OBSTETRICS & GYNECOLOGY

## 2022-06-17 PROCEDURE — 88175 CYTOPATH C/V AUTO FLUID REDO: CPT | Performed by: PATHOLOGY

## 2022-06-17 PROCEDURE — 1159F PR MEDICATION LIST DOCUMENTED IN MEDICAL RECORD: ICD-10-PCS | Mod: CPTII,,, | Performed by: OBSTETRICS & GYNECOLOGY

## 2022-06-17 PROCEDURE — 87088 URINE BACTERIA CULTURE: CPT | Performed by: OBSTETRICS & GYNECOLOGY

## 2022-06-17 PROCEDURE — 3074F PR MOST RECENT SYSTOLIC BLOOD PRESSURE < 130 MM HG: ICD-10-PCS | Mod: CPTII,,, | Performed by: OBSTETRICS & GYNECOLOGY

## 2022-06-17 PROCEDURE — 99203 PR OFFICE/OUTPT VISIT, NEW, LEVL III, 30-44 MIN: ICD-10-PCS | Mod: S$PBB,TH,, | Performed by: OBSTETRICS & GYNECOLOGY

## 2022-06-17 PROCEDURE — 87491 CHLMYD TRACH DNA AMP PROBE: CPT | Mod: 59 | Performed by: OBSTETRICS & GYNECOLOGY

## 2022-06-17 PROCEDURE — 3078F PR MOST RECENT DIASTOLIC BLOOD PRESSURE < 80 MM HG: ICD-10-PCS | Mod: CPTII,,, | Performed by: OBSTETRICS & GYNECOLOGY

## 2022-06-17 PROCEDURE — 1160F PR REVIEW ALL MEDS BY PRESCRIBER/CLIN PHARMACIST DOCUMENTED: ICD-10-PCS | Mod: CPTII,,, | Performed by: OBSTETRICS & GYNECOLOGY

## 2022-06-17 PROCEDURE — 83036 HEMOGLOBIN GLYCOSYLATED A1C: CPT | Performed by: OBSTETRICS & GYNECOLOGY

## 2022-06-17 PROCEDURE — 87591 N.GONORRHOEAE DNA AMP PROB: CPT | Performed by: OBSTETRICS & GYNECOLOGY

## 2022-06-17 PROCEDURE — 99999 PR PBB SHADOW E&M-EST. PATIENT-LVL III: ICD-10-PCS | Mod: PBBFAC,,, | Performed by: OBSTETRICS & GYNECOLOGY

## 2022-06-17 PROCEDURE — 80048 BASIC METABOLIC PNL TOTAL CA: CPT | Performed by: OBSTETRICS & GYNECOLOGY

## 2022-06-17 PROCEDURE — 87086 URINE CULTURE/COLONY COUNT: CPT | Performed by: OBSTETRICS & GYNECOLOGY

## 2022-06-17 PROCEDURE — 3008F PR BODY MASS INDEX (BMI) DOCUMENTED: ICD-10-PCS | Mod: CPTII,,, | Performed by: OBSTETRICS & GYNECOLOGY

## 2022-06-17 PROCEDURE — 87389 HIV-1 AG W/HIV-1&-2 AB AG IA: CPT | Performed by: OBSTETRICS & GYNECOLOGY

## 2022-06-17 PROCEDURE — 30000890 MAYO MISCELLANEOUS TEST (REFLEX): Performed by: OBSTETRICS & GYNECOLOGY

## 2022-06-17 RX ORDER — PYRIDOXINE HCL (VITAMIN B6) 100 MG
100 TABLET ORAL
Qty: 60 TABLET | Refills: 3 | Status: ON HOLD | COMMUNITY
Start: 2022-06-17 | End: 2023-01-13 | Stop reason: HOSPADM

## 2022-06-17 RX ORDER — PROMETHAZINE HYDROCHLORIDE 25 MG/1
25 SUPPOSITORY RECTAL EVERY 6 HOURS PRN
Qty: 12 SUPPOSITORY | Refills: 1 | Status: ON HOLD | OUTPATIENT
Start: 2022-06-17 | End: 2023-01-13 | Stop reason: HOSPADM

## 2022-06-17 NOTE — PROGRESS NOTES
OBSTETRICS /GYNECOLOGY     CC: Absence of menses / positive UPT at home     Hoa Nava is a 27 y.o. female  presents with complaint of absence of menstruation.    She reports nausea/vomIting/abdominal pain/bleeding.  UPT is positive.     Past Medical History:   Diagnosis Date    Chronic anemia 6/10/2020    Diabetes mellitus      Past Surgical History:   Procedure Laterality Date    DILATION AND CURETTAGE OF UTERUS  2019    [place]     Social History     Socioeconomic History    Marital status: Single   Tobacco Use    Smoking status: Never Smoker    Smokeless tobacco: Never Used   Substance and Sexual Activity    Alcohol use: Not Currently    Drug use: Never    Sexual activity: Yes     Partners: Male     Birth control/protection: None     Family History   Problem Relation Age of Onset    Hypertension Father      OB History    Para Term  AB Living   3 1 1   1 1   SAB IAB Ectopic Multiple Live Births   1     0 1      # Outcome Date GA Lbr Alexander/2nd Weight Sex Delivery Anes PTL Lv   3 Current            2 Term 20 39w0d  3.515 kg (7 lb 12 oz) M Vag-Spont Spinal, EPI N EDI   1 SAB 2019 3w0d              /64   Wt 68.1 kg (150 lb 2.1 oz)   LMP 04/15/2022   BMI 24.98 kg/m²     ROS:  GENERAL: Denies weight gain or weight loss. Feeling well overall.   SKIN: Denies rash or lesions.   HEAD: Denies head injury or headache.   NODES: Denies enlarged lymph nodes.   CHEST: Denies chest pain or shortness of breath.   CARDIOVASCULAR: Denies palpitations or left sided chest pain.   ABDOMEN: No abdominal pain, constipation, diarrhea, nausea, vomiting or rectal bleeding.   URINARY: No frequency, dysuria, hematuria, or burning on urination.  REPRODUCTIVE: See HPI.   BREASTS: The patient performs breast self-examination and denies pain, lumps, or nipple discharge.   HEMATOLOGIC: No easy bruisability or excessive bleeding.   MUSCULOSKELETAL: Denies joint pain or swelling.    NEUROLOGIC: Denies syncope or weakness.   PSYCHIATRIC: Denies depression, anxiety or mood swings.    PE:   APPEARANCE: Well nourished, well developed, in no acute distress.  AFFECT: WNL, alert and oriented x 3.  SKIN: No acne or hirsutism.  NECK: Neck symmetric without masses or thyromegaly.  NODES: No inguinal, cervical, axillary or femoral lymph node enlargement.  CHEST: Good respiratory effort.   ABDOMEN: Soft. No tenderness or masses. No hepatosplenomegaly. No hernias.  BREASTS: Symmetrical, no skin changes or visible lesions. No palpable masses, nipple discharge bilaterally.  PELVIC: Normal external female genitalia without lesions. Normal hair distribution. Adequate perineal body, normal urethral meatus. Vagina moist and well rugated without lesions or discharge. Cervix pink, without lesions, discharge or tenderness. No significant cystocele or rectocele. Bimanual exam shows uterus is 9 weeks, regular, mobile and nontender. Adnexa without masses or tenderness.  EXTREMITIES: No edema.          ASSESSMENT and PLAN:    1-Pregnancy Examination test , positive      Prenatal Labs  Dating US  GC/CT  Affirm   PAP        Patient was counseled today on proper weight gain based on the [employer]'s recommendations based on her pre-pregnancy weight. Discussed foods to avoid in pregnancy (i.e. sushi, fish that are high in mercury, deli meat, and unpasteurized cheeses). Discussed prenatal vitamin options (i.e. stool softener, DHA). Contingency screen offered - patient desires.  Discussed: Common complaints of pregnancy, HIV and other routine prenatal tests, Tobacco abuse, risk factors identified by prenatal history, Anticipated course of prenatal careNutrition and weight gain counseling,Toxoplasmosis precautions (Cats/Raw Meat) Exercise, Alcohol, Illicit/Recreational Drugs, Seat belt use, Childbirth classes/Hospital facilities.The counseling session lasted approximately 25 minutes, and all her questions were  answered.    Discussed nonpharmacological pain relief methods for labor, techniques and benefits of effective breastfeeding position and latch, and basic breastfeeding management. Encouraged patient to attend [facility]s Prenatal Breastfeeding Class and to download [facility]  mobile matthieu if she has not already done so. Patient verbalizes understanding.    Will review US and prenatal labs       Follow up in  4 weeks       I spent a total of 30 minutes on the day of the visit.This includes face to face time and non-face to face time preparing to see the patient (eg, review of tests), Obtaining and/or reviewing separately obtained history, Documenting clinical information in the electronic or other health record, Independently interpreting resultsand communicating results to the patient/family/caregiver, or Care coordination.      Roland Bryant M.D.   OB/GYN    6/17/2022

## 2022-06-18 LAB — RPR SER QL: NORMAL

## 2022-06-20 LAB
HAV IGM SERPL QL IA: NEGATIVE
HBV CORE IGM SERPL QL IA: NEGATIVE
HBV SURFACE AG SERPL QL IA: NEGATIVE
HBV SURFACE AG SERPL QL IA: NEGATIVE
HCV AB SERPL QL IA: NEGATIVE
HIV 1+2 AB+HIV1 P24 AG SERPL QL IA: NEGATIVE
RUBV IGG SER-ACNC: 25.8 IU/ML
RUBV IGG SER-IMP: REACTIVE

## 2022-06-21 LAB
BACTERIAL VAGINOSIS DNA: POSITIVE
C TRACH DNA SPEC QL NAA+PROBE: NOT DETECTED
CANDIDA GLABRATA DNA: NEGATIVE
CANDIDA KRUSEI DNA: NEGATIVE
CANDIDA RRNA VAG QL PROBE: NEGATIVE
N GONORRHOEA DNA SPEC QL NAA+PROBE: NOT DETECTED
T VAGINALIS RRNA GENITAL QL PROBE: NEGATIVE

## 2022-06-22 LAB
BACTERIA UR CULT: ABNORMAL
MAYO MISCELLANEOUS RESULT (REF): NORMAL

## 2022-06-23 NOTE — PROGRESS NOTES
Available prenatal labs reviewed today are within normal limits of pregnancy     Roland Bryant M.D.   OB/GYN    6/23/2022

## 2022-06-28 LAB
FINAL PATHOLOGIC DIAGNOSIS: ABNORMAL
Lab: ABNORMAL

## 2022-07-02 ENCOUNTER — TELEPHONE (OUTPATIENT)
Dept: OBSTETRICS AND GYNECOLOGY | Facility: HOSPITAL | Age: 27
End: 2022-07-02
Payer: MEDICAID

## 2022-07-27 ENCOUNTER — TELEPHONE (OUTPATIENT)
Dept: OBSTETRICS AND GYNECOLOGY | Facility: CLINIC | Age: 27
End: 2022-07-27
Payer: MEDICAID

## 2022-08-01 ENCOUNTER — ROUTINE PRENATAL (OUTPATIENT)
Dept: OBSTETRICS AND GYNECOLOGY | Facility: CLINIC | Age: 27
End: 2022-08-01
Payer: MEDICAID

## 2022-08-01 VITALS — SYSTOLIC BLOOD PRESSURE: 113 MMHG | WEIGHT: 155 LBS | DIASTOLIC BLOOD PRESSURE: 68 MMHG | BODY MASS INDEX: 25.79 KG/M2

## 2022-08-01 DIAGNOSIS — Z12.4 CERVICAL CANCER SCREENING: Primary | ICD-10-CM

## 2022-08-01 DIAGNOSIS — Z3A.15 15 WEEKS GESTATION OF PREGNANCY: ICD-10-CM

## 2022-08-01 PROCEDURE — 99213 PR OFFICE/OUTPT VISIT, EST, LEVL III, 20-29 MIN: ICD-10-PCS | Mod: S$PBB,,, | Performed by: OBSTETRICS & GYNECOLOGY

## 2022-08-01 PROCEDURE — 99999 PR PBB SHADOW E&M-EST. PATIENT-LVL III: ICD-10-PCS | Mod: PBBFAC,,, | Performed by: OBSTETRICS & GYNECOLOGY

## 2022-08-01 PROCEDURE — 99213 OFFICE O/P EST LOW 20 MIN: CPT | Mod: PBBFAC,PO | Performed by: OBSTETRICS & GYNECOLOGY

## 2022-08-01 PROCEDURE — 88175 CYTOPATH C/V AUTO FLUID REDO: CPT | Performed by: OBSTETRICS & GYNECOLOGY

## 2022-08-01 PROCEDURE — 99213 OFFICE O/P EST LOW 20 MIN: CPT | Mod: S$PBB,,, | Performed by: OBSTETRICS & GYNECOLOGY

## 2022-08-01 PROCEDURE — 99999 PR PBB SHADOW E&M-EST. PATIENT-LVL III: CPT | Mod: PBBFAC,,, | Performed by: OBSTETRICS & GYNECOLOGY

## 2022-08-02 NOTE — PROGRESS NOTES
No complaints today   No nausea or vomiting   Denies vaginal bleeding or cramping     /68   Wt 70.3 kg (154 lb 15.7 oz)   LMP 04/15/2022   BMI 25.79 kg/m²     Prenatal labs reviewed  Aneuploidy screen  : offered  Will do Quad next week  Anatomy US at 20 weeks   Consents=  Diabetes screen=  TdaP=  GBBS @34-35W=   Growth US @ 36W  BC=       General Pregnancy  recommendations given  PNV + H2O intake    FU in 2 (-)  4  (x)  weeks     Discussed nonpharmacological pain relief methods for labor, techniques and benefits of effective breastfeeding position and latch, and basic breastfeeding management. Encouraged patient to attend [facility] Pren[facility] Breastfeeding Class and to download [facility] My [employer]  mobile matthieu if she has not already done so. Patient verbalizes understanding.     I spent a total of 30 minutes on the day of the visit.This includes face to face time and non-face to face time preparing to see the patient (eg, review of tests), Obtaining and/or reviewing separately obtained history, Documenting clinical information in the electronic or other health record, Independently interpreting resultsand communicating results to the patient/family/caregiver, or Care coordination.          Roland Bryant M.D.   OB/GYN

## 2022-08-08 ENCOUNTER — TELEPHONE (OUTPATIENT)
Dept: OBSTETRICS AND GYNECOLOGY | Facility: CLINIC | Age: 27
End: 2022-08-08
Payer: MEDICAID

## 2022-08-08 ENCOUNTER — LAB VISIT (OUTPATIENT)
Dept: LAB | Facility: HOSPITAL | Age: 27
End: 2022-08-08
Attending: OBSTETRICS & GYNECOLOGY
Payer: MEDICAID

## 2022-08-08 DIAGNOSIS — Z3A.16 16 WEEKS GESTATION OF PREGNANCY: Primary | ICD-10-CM

## 2022-08-08 DIAGNOSIS — Z3A.20 20 WEEKS GESTATION OF PREGNANCY: Primary | ICD-10-CM

## 2022-08-08 DIAGNOSIS — Z3A.16 16 WEEKS GESTATION OF PREGNANCY: ICD-10-CM

## 2022-08-08 PROCEDURE — 36415 COLL VENOUS BLD VENIPUNCTURE: CPT | Performed by: OBSTETRICS & GYNECOLOGY

## 2022-08-08 PROCEDURE — 81511 FTL CGEN ABNOR FOUR ANAL: CPT | Performed by: OBSTETRICS & GYNECOLOGY

## 2022-08-11 LAB
# FETUSES US: NORMAL
2ND TRIMESTER 4 SCREEN PNL SERPL: NEGATIVE
2ND TRIMESTER 4 SCREEN SERPL-IMP: NORMAL
AFP MOM SERPL: 0.62
AFP SERPL-MCNC: 22.2 NG/ML
AGE AT DELIVERY: 27
B-HCG MOM SERPL: 1.56
B-HCG SERPL-ACNC: 54.2 IU/ML
FET TS 21 RISK FROM MAT AGE: NORMAL
GA (DAYS): 3 D
GA (WEEKS): 16 WK
GA METHOD: NORMAL
IDDM PATIENT QL: NORMAL
INHIBIN A MOM SERPL: 1.09
INHIBIN A SERPL-MCNC: 159.7 PG/ML
SMOKING STATUS FTND: NO
TS 18 RISK FETUS: NORMAL
TS 21 RISK FETUS: NORMAL
U ESTRIOL MOM SERPL: 1.06
U ESTRIOL SERPL-MCNC: 1.17 NG/ML

## 2022-08-17 ENCOUNTER — TELEPHONE (OUTPATIENT)
Dept: OBSTETRICS AND GYNECOLOGY | Facility: CLINIC | Age: 27
End: 2022-08-17
Payer: MEDICAID

## 2022-08-17 NOTE — TELEPHONE ENCOUNTER
----- Message from Jacquie Hall MA sent at 8/17/2022 10:31 AM CDT -----  Contact: [phone]  Patient is returning a phone call.    Who left a message for the patient:Amber Jose    Does patient know what this is regarding: results    Would you like a call back, or a response through your [facility] portal?: call back     Comments: staff will need a  when given a call back

## 2022-08-29 ENCOUNTER — ROUTINE PRENATAL (OUTPATIENT)
Dept: OBSTETRICS AND GYNECOLOGY | Facility: CLINIC | Age: 27
End: 2022-08-29
Payer: MEDICAID

## 2022-08-29 VITALS
DIASTOLIC BLOOD PRESSURE: 48 MMHG | SYSTOLIC BLOOD PRESSURE: 110 MMHG | WEIGHT: 160.25 LBS | BODY MASS INDEX: 26.67 KG/M2

## 2022-08-29 DIAGNOSIS — Z3A.19 19 WEEKS GESTATION OF PREGNANCY: Primary | ICD-10-CM

## 2022-08-29 DIAGNOSIS — D64.9 CHRONIC ANEMIA: ICD-10-CM

## 2022-08-29 PROCEDURE — 99999 PR PBB SHADOW E&M-EST. PATIENT-LVL III: ICD-10-PCS | Mod: PBBFAC,,, | Performed by: OBSTETRICS & GYNECOLOGY

## 2022-08-29 PROCEDURE — 99213 PR OFFICE/OUTPT VISIT, EST, LEVL III, 20-29 MIN: ICD-10-PCS | Mod: S$PBB,TH,, | Performed by: OBSTETRICS & GYNECOLOGY

## 2022-08-29 PROCEDURE — 99213 OFFICE O/P EST LOW 20 MIN: CPT | Mod: PBBFAC,PO | Performed by: OBSTETRICS & GYNECOLOGY

## 2022-08-29 PROCEDURE — 99999 PR PBB SHADOW E&M-EST. PATIENT-LVL III: CPT | Mod: PBBFAC,,, | Performed by: OBSTETRICS & GYNECOLOGY

## 2022-08-29 PROCEDURE — 99213 OFFICE O/P EST LOW 20 MIN: CPT | Mod: S$PBB,TH,, | Performed by: OBSTETRICS & GYNECOLOGY

## 2022-08-29 NOTE — PROGRESS NOTES
No complaints today   No nausea or vomiting   Denies vaginal bleeding or cramping     BP (!) 110/48   Wt 72.7 kg (160 lb 4.4 oz)   LMP 04/15/2022   BMI 26.67 kg/m²     Prenatal labs reviewed  Aneuploidy screen  : offered  Will do Quad next week  Anatomy US at 20 weeks   Consents=  Diabetes screen=  TdaP=  GBBS @34-35W=   Growth US @ 36W  BC=       General Pregnancy  recommendations given  PNV + H2O intake    FU in 2 (-)  4  (x)  weeks     Discussed nonpharmacological pain relief methods for labor, techniques and benefits of effective breastfeeding position and latch, and basic breastfeeding management. Encouraged patient to attend [facility] Prenatal Breastfeeding Class and to download [facility] My My[facility]  mobile matthieu if she has not already done so. Patient verbalizes understanding.     I spent a total of 30 minutes on the day of the visit.This includes face to face time and non-face to face time preparing to see the patient (eg, review of tests), Obtaining and/or reviewing separately obtained history, Documenting clinical information in the electronic or other health record, Independently interpreting resultsand communicating results to the patient/family/caregiver, or Care coordination.          Roland Bryant M.D.   OB/GYN      Trace leuk

## 2022-09-02 ENCOUNTER — PROCEDURE VISIT (OUTPATIENT)
Dept: OBSTETRICS AND GYNECOLOGY | Facility: CLINIC | Age: 27
End: 2022-09-02
Payer: MEDICAID

## 2022-09-02 DIAGNOSIS — Z3A.20 20 WEEKS GESTATION OF PREGNANCY: ICD-10-CM

## 2022-09-02 PROCEDURE — 76805 OB US >/= 14 WKS SNGL FETUS: CPT | Mod: PBBFAC,PO | Performed by: OBSTETRICS & GYNECOLOGY

## 2022-09-02 PROCEDURE — 76805 US OB/GYN PROCEDURE (VIEWPOINT): ICD-10-PCS | Mod: 26,S$PBB,, | Performed by: OBSTETRICS & GYNECOLOGY

## 2022-09-26 ENCOUNTER — ROUTINE PRENATAL (OUTPATIENT)
Dept: OBSTETRICS AND GYNECOLOGY | Facility: CLINIC | Age: 27
End: 2022-09-26
Payer: MEDICAID

## 2022-09-26 VITALS
BODY MASS INDEX: 27.66 KG/M2 | WEIGHT: 166.25 LBS | DIASTOLIC BLOOD PRESSURE: 74 MMHG | SYSTOLIC BLOOD PRESSURE: 119 MMHG

## 2022-09-26 DIAGNOSIS — Z3A.24 24 WEEKS GESTATION OF PREGNANCY: Primary | ICD-10-CM

## 2022-09-26 DIAGNOSIS — Z34.82 ENCOUNTER FOR SUPERVISION OF OTHER NORMAL PREGNANCY IN SECOND TRIMESTER: ICD-10-CM

## 2022-09-26 PROCEDURE — 99213 OFFICE O/P EST LOW 20 MIN: CPT | Mod: S$PBB,TH,, | Performed by: OBSTETRICS & GYNECOLOGY

## 2022-09-26 PROCEDURE — 99212 OFFICE O/P EST SF 10 MIN: CPT | Mod: PBBFAC,TH,PO | Performed by: OBSTETRICS & GYNECOLOGY

## 2022-09-26 PROCEDURE — 99999 PR PBB SHADOW E&M-EST. PATIENT-LVL II: ICD-10-PCS | Mod: PBBFAC,,, | Performed by: OBSTETRICS & GYNECOLOGY

## 2022-09-26 PROCEDURE — 99999 PR PBB SHADOW E&M-EST. PATIENT-LVL II: CPT | Mod: PBBFAC,,, | Performed by: OBSTETRICS & GYNECOLOGY

## 2022-09-26 PROCEDURE — 99213 PR OFFICE/OUTPT VISIT, EST, LEVL III, 20-29 MIN: ICD-10-PCS | Mod: S$PBB,TH,, | Performed by: OBSTETRICS & GYNECOLOGY

## 2022-09-27 NOTE — PROGRESS NOTES
No complaints today   No nausea or vomiting   Denies vaginal bleeding or cramping     /74   Wt 75.4 kg (166 lb 3.6 oz)   LMP 04/15/2022   BMI 27.66 kg/m²     Prenatal labs reviewed  Aneuploidy screen  : offered  Will do Quad next week  Anatomy US at 20 weeks   Consents=  Diabetes screen=  TdaP=  GBBS @34-35W=   Growth US @ 36W  BC=       General Pregnancy  recommendations given  PNV + H2O intake    FU in 2 (-)  4  (x)  weeks     Discussed nonpharmacological pain relief methods for labor, techniques and benefits of effective breastfeeding position and latch, and basic breastfeeding management. Encouraged patient to attend [facility] Pren[facility] Breastfeeding Class and to download [facility] My [employer]  mobile matthieu if she has not already done so. Patient verbalizes understanding.     I spent a total of 30 minutes on the day of the visit.This includes face to face time and non-face to face time preparing to see the patient (eg, review of tests), Obtaining and/or reviewing separately obtained history, Documenting clinical information in the electronic or other health record, Independently interpreting resultsand communicating results to the patient/family/caregiver, or Care coordination.          Roland Bryant M.D.   OB/GYN      Trace leuk

## 2022-10-25 ENCOUNTER — CLINICAL SUPPORT (OUTPATIENT)
Dept: OBSTETRICS AND GYNECOLOGY | Facility: CLINIC | Age: 27
End: 2022-10-25
Payer: MEDICAID

## 2022-10-25 ENCOUNTER — LAB VISIT (OUTPATIENT)
Dept: LAB | Facility: HOSPITAL | Age: 27
End: 2022-10-25
Attending: OBSTETRICS & GYNECOLOGY
Payer: MEDICAID

## 2022-10-25 ENCOUNTER — TELEPHONE (OUTPATIENT)
Dept: OBSTETRICS AND GYNECOLOGY | Facility: CLINIC | Age: 27
End: 2022-10-25
Payer: MEDICAID

## 2022-10-25 ENCOUNTER — ROUTINE PRENATAL (OUTPATIENT)
Dept: OBSTETRICS AND GYNECOLOGY | Facility: CLINIC | Age: 27
End: 2022-10-25
Payer: MEDICAID

## 2022-10-25 VITALS
SYSTOLIC BLOOD PRESSURE: 108 MMHG | WEIGHT: 169.31 LBS | DIASTOLIC BLOOD PRESSURE: 68 MMHG | BODY MASS INDEX: 28.18 KG/M2

## 2022-10-25 DIAGNOSIS — Z34.82 ENCOUNTER FOR SUPERVISION OF OTHER NORMAL PREGNANCY IN SECOND TRIMESTER: ICD-10-CM

## 2022-10-25 DIAGNOSIS — Z3A.28 28 WEEKS GESTATION OF PREGNANCY: Primary | ICD-10-CM

## 2022-10-25 DIAGNOSIS — Z3A.28 28 WEEKS GESTATION OF PREGNANCY: ICD-10-CM

## 2022-10-25 DIAGNOSIS — Z23 NEED FOR TETANUS, DIPHTHERIA, AND ACELLULAR PERTUSSIS (TDAP) VACCINE: Primary | ICD-10-CM

## 2022-10-25 DIAGNOSIS — Z3A.27 27 WEEKS GESTATION OF PREGNANCY: Primary | ICD-10-CM

## 2022-10-25 LAB — GLUCOSE SERPL-MCNC: 181 MG/DL (ref 70–140)

## 2022-10-25 PROCEDURE — 99213 OFFICE O/P EST LOW 20 MIN: CPT | Mod: S$PBB,TH,, | Performed by: OBSTETRICS & GYNECOLOGY

## 2022-10-25 PROCEDURE — 82950 GLUCOSE TEST: CPT | Performed by: OBSTETRICS & GYNECOLOGY

## 2022-10-25 PROCEDURE — 99999 PR PBB SHADOW E&M-EST. PATIENT-LVL II: CPT | Mod: PBBFAC,,, | Performed by: OBSTETRICS & GYNECOLOGY

## 2022-10-25 PROCEDURE — 99212 OFFICE O/P EST SF 10 MIN: CPT | Mod: PBBFAC,TH,PO | Performed by: OBSTETRICS & GYNECOLOGY

## 2022-10-25 PROCEDURE — 99213 PR OFFICE/OUTPT VISIT, EST, LEVL III, 20-29 MIN: ICD-10-PCS | Mod: S$PBB,TH,, | Performed by: OBSTETRICS & GYNECOLOGY

## 2022-10-25 PROCEDURE — 90715 TDAP VACCINE 7 YRS/> IM: CPT | Mod: PBBFAC,PO

## 2022-10-25 PROCEDURE — 36415 COLL VENOUS BLD VENIPUNCTURE: CPT | Performed by: OBSTETRICS & GYNECOLOGY

## 2022-10-25 PROCEDURE — 99999 PR PBB SHADOW E&M-EST. PATIENT-LVL II: ICD-10-PCS | Mod: PBBFAC,,, | Performed by: OBSTETRICS & GYNECOLOGY

## 2022-10-25 NOTE — PROGRESS NOTES
No complaints today   No nausea or vomiting   Denies vaginal bleeding or cramping     /68   Wt 76.8 kg (169 lb 5 oz)   LMP 04/15/2022   BMI 28.18 kg/m²     Prenatal labs reviewed  Aneuploidy screen  : offered  Will do Quad next week  Anatomy US at 20 weeks   Consents=signed   Diabetes screen=  TdaP= given today   GBBS @34-35W=   Growth US @ 36W  BC=       General Pregnancy  recommendations given  PNV + H2O intake    FU in 2 (-)  4  (x)  weeks     Discussed nonpharmacological pain relief methods for labor, techniques and benefits of effective breastfeeding position and latch, and basic breastfeeding management. Encouraged patient to attend [facility] Pren[facility] Breastfeeding Class and to download [facility] My [employer]  mobile matthieu if she has not already done so. Patient verbalizes understanding.     I spent a total of 30 minutes on the day of the visit.This includes face to face time and non-face to face time preparing to see the patient (eg, review of tests), Obtaining and/or reviewing separately obtained history, Documenting clinical information in the electronic or other health record, Independently interpreting resultsand communicating results to the patient/family/caregiver, or Care coordination.          Roland Bryant M.D.   OB/GYN      Trace leuk

## 2022-10-25 NOTE — PROGRESS NOTES
Patient given TDAP in LEFT deltoid. [place] VIS given. Patient asked to wait 15 min in the lobby to monitor for adverse reaction. Patient verbalized understanding. Patient has received immunizations in the past without any complications.      ID# [id number] with [employer] started to translate for injection appointment, then phone line disconnected. Davin WHEELER MA translated for rest of injection appointment.

## 2022-10-26 ENCOUNTER — TELEPHONE (OUTPATIENT)
Dept: OBSTETRICS AND GYNECOLOGY | Facility: CLINIC | Age: 27
End: 2022-10-26
Payer: MEDICAID

## 2022-10-26 DIAGNOSIS — Z3A.27 27 WEEKS GESTATION OF PREGNANCY: Primary | ICD-10-CM

## 2022-10-26 NOTE — TELEPHONE ENCOUNTER
----- Message from Tyree Santoyo sent at 10/26/2022  1:51 PM CDT -----  Contact: Pt  .Type:  Needs Medical Advice    Who Called: Pt  Symptoms (please be specific): fever, cough, body aches, back pain  How long has patient had these symptoms:  Yesterday  Pharmacy name and phone #:  [id number] - [id number], [place] - [address]  Phone: [phone]  Fax:  [phone]  Would the patient rather a call back or a response via [facility]? Call  Best Call Back Number: [phone]  Additional Information:   Pt is experiencing flu like symptoms and would like some medication.  Pt has taken pain relieverl and hasn't worked.

## 2022-10-26 NOTE — TELEPHONE ENCOUNTER
----- Message from Tyree Santoyo sent at 10/26/2022  1:51 PM CDT -----  Contact: Pt  .Type:  Needs Medical Advice    Who Called: Pt  Symptoms (please be specific): fever, cough, body aches, back pain  How long has patient had these symptoms:  Yesterday  Pharmacy name and phone #:  [id number] - [place], [place] - [address]  Phone: [phone]  Fax:  [phone]  Would the patient rather a call back or a response via [facility]? Call  Best Call Back Number: [phone]  Additional Information:   Pt is experiencing flu like symptoms and would like some medication.  Pt has taken pain relieverl and hasn't worked.

## 2022-10-26 NOTE — TELEPHONE ENCOUNTER
Returned pt call and per Manny advise is to take otc medications and can go to urgent care to be tested and treated. Pt informs us that her throat was a little sore before getting tdap-and after getting her t-dap she started feeling worse flu like symptoms because at 2a.m she felt like she had fever but didn't have anything to check it with. Pt verbalized understanding. If has trouble breathing or heart beasts fast as indicated on tdap alysia paperwork is to head to e.r. We also moved 3hr gt to next Monday.

## 2022-10-31 ENCOUNTER — LAB VISIT (OUTPATIENT)
Dept: LAB | Facility: HOSPITAL | Age: 27
End: 2022-10-31
Attending: OBSTETRICS & GYNECOLOGY
Payer: MEDICAID

## 2022-10-31 DIAGNOSIS — Z3A.27 27 WEEKS GESTATION OF PREGNANCY: ICD-10-CM

## 2022-10-31 LAB
GLUCOSE SERPL-MCNC: 103 MG/DL (ref 70–110)
GLUCOSE SERPL-MCNC: 164 MG/DL
GLUCOSE SERPL-MCNC: 228 MG/DL
GLUCOSE SERPL-MCNC: 263 MG/DL

## 2022-10-31 PROCEDURE — 82951 GLUCOSE TOLERANCE TEST (GTT): CPT | Performed by: OBSTETRICS & GYNECOLOGY

## 2022-10-31 PROCEDURE — 36415 COLL VENOUS BLD VENIPUNCTURE: CPT | Performed by: OBSTETRICS & GYNECOLOGY

## 2022-11-10 ENCOUNTER — CLINICAL SUPPORT (OUTPATIENT)
Dept: DIABETES | Facility: CLINIC | Age: 27
End: 2022-11-10
Payer: MEDICAID

## 2022-11-10 DIAGNOSIS — O24.419 GESTATIONAL DIABETES MELLITUS (GDM) IN THIRD TRIMESTER, GESTATIONAL DIABETES METHOD OF CONTROL UNSPECIFIED: ICD-10-CM

## 2022-11-10 DIAGNOSIS — Z3A.29 29 WEEKS GESTATION OF PREGNANCY: ICD-10-CM

## 2022-11-10 PROCEDURE — G0108 DIAB MANAGE TRN  PER INDIV: HCPCS | Mod: PBBFAC,PO | Performed by: DIETITIAN, REGISTERED

## 2022-11-10 NOTE — PROGRESS NOTES
Diabetes Care Specialist Progress Note  Author: Luz Marina Shukla RD  Date: 11/10/2022    Program Intake  Reason for Diabetes Program Visit:: Initial Diabetes Assessment  Current diabetes risk level:: high  In the last 12 months, have you:: none  Permission to speak with others about care:: no    Lab Results   Component Value Date    HGBA1C 5.9 (H) 06/17/2022       Clinical    Problem Review  Reviewed Problem List with Patient: yes  Active comorbidities affecting diabetes self-care.: no  Reviewed health maintenance: yes    Clinical Assessment  Current Diabetes Treatment: Diet  Have you ever experienced hypoglycemia (low blood sugar)?: no  Have you ever experienced hyperglycemia (high blood sugar)?: yes  In the last month, how often have you experienced high blood sugar?: more than once a week  Are you able to tell when your blood sugar is high?: No (comment)  Have you ever been hospitalized because your blood sugar was high?: no    Medication Information  How do you obtain your medications?: Patient drives, Family picks up  How many days a week do you miss your medications?: Never  Do you use a pill box or medication chart to help you manage your medications?: No  Do you sometimes have difficulty refilling your medications?: No  Medication adherence impacting ability to self-manage diabetes?: No    Labs  Do you have regular lab work to monitor your medications?: Yes  Type of Regular Lab Work: CBC, BMP  Lab Compliance Barriers: No    Nutritional Status  Diet: Regular  Meal Plan 24 Hour Recall: Breakfast, Lunch, Dinner, Snack  Meal Plan 24 Hour Recall - Breakfast: 1 sweet bread, black coffee  Meal Plan 24 Hour Recall - Lunch: rice, beans, meat, juice or soda  Meal Plan 24 Hour Recall - Dinner: plantains, beans, eggs, cheese, juice or soda  Meal Plan 24 Hour Recall - Snack: fruit, milk  Change in appetite?: No  Dentation:: Intact  Recent Changes in Weight: Weight Gain (due to pregnancy)  Current nutritional status an area  of need that is impacting patient's ability to self-manage diabetes?: Yes    Additional Social History    Support  Does anyone support you with your diabetes care?: yes  Who supports you?: self, family member  Who takes you to your medical appointments?: self, family member  Does the current support meet the patient's needs?: Yes  Is Support an area impacting ability to self-manage diabetes?: No    Access to Mass Media & Technology  Does the patient have access to any of the following devices or technologies?: Smart phone  Media or technology needs impacting ability to self-manage diabetes?: No    Cognitive/Behavioral Health  Alert and Oriented: Yes  Difficulty Thinking: No  Requires Prompting: No  Requires assistance for routine expression?: No  Cognitive or behavioral barriers impacting ability to self-manage diabetes?: No         Communication  Language preference: English  Hearing Problems: No  Vision Problems: No  Communication needs impacting ability to self-manage diabetes?: No    Health Literacy  Preferred Learning Method: Face to Face, Reading Materials  How often do you need to have someone help you read instructions, pamphlets, or written material from your doctor or pharmacy?: Rarely  Health literacy needs impacting ability to self-manage diabetes?: No      Diabetes Self-Management Skills Assessment    Diabetes Disease Process/Treatment Options  Patient/caregiver able to state what happens when someone has diabetes.: somewhat  Patient/caregiver knows what type of diabetes they have.: yes  Diabetes Type : Gestational  Patient/caregiver able to identify at least three signs and symptoms of diabetes.: no  Patient able to identify at least three risk factors for diabetes.: no  Diabetes Disease Process/Treatment Options: Skills Assessment Completed: Yes  Assessment indicates:: Instruction Needed  Area of need?: Yes    Nutrition/Healthy Eating  Challenges to healthy eating:: portion control  Method of  carbohydrate measurement:: no method  Patient can identify foods that impact blood sugar.: yes  Patient-identified foods:: starches (bread, pasta, rice, cereal), sweets  Nutrition/Healthy Eating Skills Assessment Completed:: Yes  Assessment indicates:: Instruction Needed  Area of need?: Yes    Physical Activity/Exercise  Patient's daily activity level:: lightly active  Patient formally exercises outside of work.: no  Reasons for not exercising:: other (see comments) (no reason)  Patient can identify forms of physical activity.: yes  Stated forms of physical activity:: moving to burn calories  Patient can identify reasons why exercise/physical activity is important in diabetes management.: no  Physical Activity/Exercise Skills Assessment Completed: : Yes  Assessment indicates:: Instruction Needed  Area of need?: No    Medications  Patient is able to describe current diabetes management routine.: yes  Diabetes management routine:: diet  Patient is able to identify current diabetes medications, dosages, and appropriate timing of medications.: yes  Patient understands the purpose of the medications taken for diabetes.:  (Pt does not take meds for DM)  Patient reports problems or concerns with current medication regimen.: no  Medication Skills Assessment Completed:: Yes  Assessment indicates:: Adequate understanding  Area of need?: No    Home Blood Glucose Monitoring  Patient states that blood sugar is checked at home daily.: no  Reasons for not monitoring:: new diabetes diagnosis  Home Blood Glucose Monitoring Skills Assessment Completed: : Yes  Assessment indicates:: Adequate understanding  Area of need?: No    Acute Complications  Acute Complications Skills Assessment Completed: : No  Deffered due to:: Time    Chronic Complications  Chronic Complications Skills Assessment Completed: : No  Deferred due to:: Time    Psychosocial/Coping  Psychosocial/Coping Skills Assessment Completed: : No  Deffered due to::  Time      Assessment Summary and Plan    Based on today's diabetes care assessment, the following areas of need were identified:      Social 11/10/2022   Support No   Access to Mass Media/Tech No   Cognitive/Behavioral Health No   Communication No   Health Literacy No        Clinical 11/10/2022   Medication Adherence No   Lab Compliance No   Nutritional Status Yes, see care plan        Diabetes Self-Management Skills 11/10/2022   Diabetes Disease Process/Treatment Options Yes, see care plan   Nutrition/Healthy Eating Yes, see care plan   Physical Activity/Exercise No   Medication No   Home Blood Glucose Monitoring No          Today's interventions were provided through individual discussion, instruction, and written materials were provided.      Patient verbalized understanding of instruction and written materials.  Pt was able to return back demonstration of instructions today. Patient understood key points, needs reinforcement and further instruction.     Diabetes Self-Management Care Plan:    Today's Diabetes Self-Management Care Plan was developed with Hoa's input. Hoa has agreed to work toward the following goal(s) to improve his/her overall diabetes control.      Care Plan: Diabetes Management   Updates made since 10/11/2022 12:00 AM        Problem: Disease Process         Goal: Patient agrees to take steps toward understanding the diabetes disease process and treatment options by asking questions of her MD.    Start Date: 11/10/2022   Expected End Date: 1/18/2023   Priority: Low        Task: Provided a basic introduction of the diabetes disease process, diagnosis, progression, and how diabetes can be successfully managed. Completed 11/10/2022     Task: Reviewed GDM and how to manage Completed 11/10/2022       Problem: Physical Activity and Exercise         Goal: Patient agrees to increase physical activity to a goal of 5 times per week for 30 minutes.    Start Date: 11/10/2022   Expected End Date:  1/18/2023   Priority: Medium        Task: Discussed role of physical activity on reducing insulin resistance and improvement in overall glycemic control. Completed 11/10/2022       Problem: Healthy Eating         Goal: Eat 3 meals daily with 3 snacks daily if needed. Eat 30g/2 servings of Carbohydrate for breakfast and snacks. Eat 60g carbs for lunch and dinner.    Start Date: 11/10/2022   Expected End Date: 1/18/2023   Priority: High   Barriers: No Barriers Identified        Task: Reviewed the sources and role of Carbohydrate, Protein, and Fat and how each nutrient impacts blood sugar. Completed 11/10/2022     Task: Explained how to count carbohydrates using the food label and the use of dry measuring cups for accurate carb counting. Completed 11/10/2022     Task: Recommended replacing beverages containing high sugar content with noncaloric/sugar free options and/or water. Completed 11/10/2022        Task: Review the importance of balancing carbohydrates with each meal using portion control techniques to count servings of carbohydrate and label reading to identify serving size and amount of total carbs per serving. Completed 11/10/2022         Follow Up Plan     No follow-ups on file. If pt needs another appt, review meal planning and carbs per meal, intake of sugary beverages, BG logs.    Today's care plan and follow up schedule was discussed with patient.  Hoa verbalized understanding of the care plan, goals, and agrees to follow up plan.        The patient was encouraged to communicate with his/her health care provider/physician and care team regarding his/her condition(s) and treatment.  I provided the patient with my contact information today and encouraged to contact me via phone or [facility] Patient Portal as needed.     Length of Visit   Total Time: 60 Minutes

## 2022-11-11 ENCOUNTER — TELEPHONE (OUTPATIENT)
Dept: OBSTETRICS AND GYNECOLOGY | Facility: HOSPITAL | Age: 27
End: 2022-11-11
Payer: MEDICAID

## 2022-11-11 RX ORDER — DEXTROSE 4 G
1 TABLET,CHEWABLE ORAL 4 TIMES DAILY
Qty: 1 EACH | Refills: 0 | Status: SHIPPED | OUTPATIENT
Start: 2022-11-11 | End: 2022-11-23

## 2022-11-11 RX ORDER — BLOOD-GLUCOSE CONTROL, NORMAL
EACH MISCELLANEOUS 4 TIMES DAILY
Qty: 100 EACH | Refills: 2 | Status: ON HOLD | OUTPATIENT
Start: 2022-11-11 | End: 2023-01-13 | Stop reason: HOSPADM

## 2022-11-11 NOTE — TELEPHONE ENCOUNTER
Glucometer and supplies sent to [facility]   Patient already went to Diabetes education     Roland Bryant M.D.   OB/GYN    11/11/2022

## 2022-11-14 ENCOUNTER — TELEPHONE (OUTPATIENT)
Dept: OBSTETRICS AND GYNECOLOGY | Facility: CLINIC | Age: 27
End: 2022-11-14
Payer: MEDICAID

## 2022-11-14 NOTE — TELEPHONE ENCOUNTER
----- Message from Sana Sánchez sent at 11/14/2022  3:14 PM CST -----  Type:  Medical clearance     Who Called:Pt /    Would the patient rather a call back or a response via [facility]? Call   Best Call Back Number: [phone]  Additional Information:     Requesting clearance for dental work ([facility])

## 2022-11-14 NOTE — TELEPHONE ENCOUNTER
Spoke to pt and informed her that dental clearance letter will be up front with  for her. PT verbalized understanding.

## 2022-11-22 ENCOUNTER — ROUTINE PRENATAL (OUTPATIENT)
Dept: OBSTETRICS AND GYNECOLOGY | Facility: CLINIC | Age: 27
End: 2022-11-22
Payer: MEDICAID

## 2022-11-22 VITALS
SYSTOLIC BLOOD PRESSURE: 104 MMHG | BODY MASS INDEX: 28.73 KG/M2 | WEIGHT: 172.63 LBS | DIASTOLIC BLOOD PRESSURE: 52 MMHG

## 2022-11-22 DIAGNOSIS — O24.419 GESTATIONAL DIABETES MELLITUS (GDM) IN THIRD TRIMESTER, GESTATIONAL DIABETES METHOD OF CONTROL UNSPECIFIED: ICD-10-CM

## 2022-11-22 DIAGNOSIS — Z34.83 ENCOUNTER FOR SUPERVISION OF OTHER NORMAL PREGNANCY IN THIRD TRIMESTER: ICD-10-CM

## 2022-11-22 DIAGNOSIS — Z3A.31 31 WEEKS GESTATION OF PREGNANCY: Primary | ICD-10-CM

## 2022-11-22 PROCEDURE — 99213 OFFICE O/P EST LOW 20 MIN: CPT | Mod: PBBFAC,TH,PO | Performed by: OBSTETRICS & GYNECOLOGY

## 2022-11-22 PROCEDURE — 99214 OFFICE O/P EST MOD 30 MIN: CPT | Mod: S$PBB,TH,, | Performed by: OBSTETRICS & GYNECOLOGY

## 2022-11-22 PROCEDURE — 99214 PR OFFICE/OUTPT VISIT, EST, LEVL IV, 30-39 MIN: ICD-10-PCS | Mod: S$PBB,TH,, | Performed by: OBSTETRICS & GYNECOLOGY

## 2022-11-22 PROCEDURE — 99999 PR PBB SHADOW E&M-EST. PATIENT-LVL III: ICD-10-PCS | Mod: PBBFAC,,, | Performed by: OBSTETRICS & GYNECOLOGY

## 2022-11-22 PROCEDURE — 99999 PR PBB SHADOW E&M-EST. PATIENT-LVL III: CPT | Mod: PBBFAC,,, | Performed by: OBSTETRICS & GYNECOLOGY

## 2022-11-23 NOTE — PROGRESS NOTES
No complaints today   No nausea or vomiting   Denies vaginal bleeding or cramping     Not using the glucometer , did not go to the pharmacy to pick it up       BP (!) 104/52   Wt 78.3 kg (172 lb 9.9 oz)   LMP 04/15/2022   BMI 28.73 kg/m²     Prenatal labs reviewed  Aneuploidy screen  : offered  Will do Quad next week  Anatomy US at 20 weeks   Consents=signed   Diabetes screen=Gestationla diabetes   TdaP= given today   GBBS @34-35W=   Growth US @ 36W  BC=       General Pregnancy  recommendations given  PNV + H2O intake    FU in 2weeks with Blood sugar log   Prescriptions were sent 2 weeks ago to [facility]   Patient was waiting for a call from [facility]  , very confused   We explain  Again about the diet, the glucometer, the blood sugar log   Voices understanding        Discussed nonpharmacological pain relief methods for labor, techniques and benefits of effective breastfeeding position and latch, and basic breastfeeding management. Encouraged patient to [facility] and to download [facility]  mobile matthieu if she has not already done so. Patient verbalizes understanding.     I spent a total of 30 minutes on the day of the visit.This includes face to face time and non-face to face time preparing to see the patient (eg, review of tests), Obtaining and/or reviewing separately obtained history, Documenting clinical information in the electronic or other health record, Independently interpreting resultsand communicating results to the patient/family/caregiver, or Care coordination.          Roland Bryant M.D.   OB/GYN      Trace leuk

## 2022-12-06 ENCOUNTER — TELEPHONE (OUTPATIENT)
Dept: OBSTETRICS AND GYNECOLOGY | Facility: CLINIC | Age: 27
End: 2022-12-06

## 2022-12-06 ENCOUNTER — ROUTINE PRENATAL (OUTPATIENT)
Dept: OBSTETRICS AND GYNECOLOGY | Facility: CLINIC | Age: 27
End: 2022-12-06
Payer: MEDICAID

## 2022-12-06 VITALS
WEIGHT: 173.31 LBS | DIASTOLIC BLOOD PRESSURE: 60 MMHG | BODY MASS INDEX: 28.84 KG/M2 | SYSTOLIC BLOOD PRESSURE: 104 MMHG

## 2022-12-06 DIAGNOSIS — Z3A.33 33 WEEKS GESTATION OF PREGNANCY: Primary | ICD-10-CM

## 2022-12-06 DIAGNOSIS — Z34.83 ENCOUNTER FOR SUPERVISION OF OTHER NORMAL PREGNANCY IN THIRD TRIMESTER: ICD-10-CM

## 2022-12-06 DIAGNOSIS — O24.410 DIET CONTROLLED GESTATIONAL DIABETES MELLITUS (GDM) IN THIRD TRIMESTER: ICD-10-CM

## 2022-12-06 DIAGNOSIS — Z3A.35 35 WEEKS GESTATION OF PREGNANCY: Primary | ICD-10-CM

## 2022-12-06 PROCEDURE — 99212 OFFICE O/P EST SF 10 MIN: CPT | Mod: PBBFAC,TH,PO | Performed by: OBSTETRICS & GYNECOLOGY

## 2022-12-06 PROCEDURE — 99214 PR OFFICE/OUTPT VISIT, EST, LEVL IV, 30-39 MIN: ICD-10-PCS | Mod: S$PBB,TH,, | Performed by: OBSTETRICS & GYNECOLOGY

## 2022-12-06 PROCEDURE — 99999 PR PBB SHADOW E&M-EST. PATIENT-LVL II: CPT | Mod: PBBFAC,,, | Performed by: OBSTETRICS & GYNECOLOGY

## 2022-12-06 PROCEDURE — 99214 OFFICE O/P EST MOD 30 MIN: CPT | Mod: S$PBB,TH,, | Performed by: OBSTETRICS & GYNECOLOGY

## 2022-12-06 PROCEDURE — 99999 PR PBB SHADOW E&M-EST. PATIENT-LVL II: ICD-10-PCS | Mod: PBBFAC,,, | Performed by: OBSTETRICS & GYNECOLOGY

## 2022-12-06 NOTE — PROGRESS NOTES
No complaints today   No nausea or vomiting   Denies vaginal bleeding or cramping     Not using the glucometer , did not go to the pharmacy to pick it up       /60   Wt 78.6 kg (173 lb 4.5 oz)   LMP 04/15/2022   BMI 28.84 kg/m²     Prenatal labs reviewed  Aneuploidy screen  : offered  Will do Quad next week  Anatomy US at 20 weeks   Consents=signed   Diabetes screen=Gestationla diabetes   TdaP= given today   GBBS @34-35W=   Growth US @ 36W  BC=       General Pregnancy  recommendations given  PNV + H2O intake    FU in 2weeks with Blood sugar log     Blood sugar log= last 4 days are on target   Was not follwing the diet  Properly        Discussed nonpharmacological pain relief methods for labor, techniques and benefits of effective breastfeeding position and latch, and basic breastfeeding management. Encouraged patient to attend [facility] Pren[facility] Breastfeeding Class and to download [facility]  mobile matthieu if she has not already done so. Patient verbalizes understanding.     I spent a total of 30 minutes on the day of the visit.This includes face to face time and non-face to face time preparing to see the patient (eg, review of tests), Obtaining and/or reviewing separately obtained history, Documenting clinical information in the electronic or other health record, Independently interpreting resultsand communicating results to the patient/family/caregiver, or Care coordination.          Roland Bryant M.D.   OB/GYN      Trace leuk

## 2022-12-20 ENCOUNTER — LAB VISIT (OUTPATIENT)
Dept: LAB | Facility: HOSPITAL | Age: 27
End: 2022-12-20
Payer: MEDICAID

## 2022-12-20 ENCOUNTER — ROUTINE PRENATAL (OUTPATIENT)
Dept: OBSTETRICS AND GYNECOLOGY | Facility: CLINIC | Age: 27
End: 2022-12-20
Payer: MEDICAID

## 2022-12-20 ENCOUNTER — TELEPHONE (OUTPATIENT)
Dept: OBSTETRICS AND GYNECOLOGY | Facility: CLINIC | Age: 27
End: 2022-12-20
Payer: MEDICAID

## 2022-12-20 VITALS
SYSTOLIC BLOOD PRESSURE: 112 MMHG | WEIGHT: 178.38 LBS | BODY MASS INDEX: 29.68 KG/M2 | DIASTOLIC BLOOD PRESSURE: 60 MMHG

## 2022-12-20 DIAGNOSIS — Z34.83 ENCOUNTER FOR SUPERVISION OF OTHER NORMAL PREGNANCY IN THIRD TRIMESTER: ICD-10-CM

## 2022-12-20 DIAGNOSIS — Z3A.35 35 WEEKS GESTATION OF PREGNANCY: Primary | ICD-10-CM

## 2022-12-20 DIAGNOSIS — O24.415 GESTATIONAL DIABETES MELLITUS (GDM) IN THIRD TRIMESTER CONTROLLED ON ORAL HYPOGLYCEMIC DRUG: ICD-10-CM

## 2022-12-20 DIAGNOSIS — Z3A.35 35 WEEKS GESTATION OF PREGNANCY: ICD-10-CM

## 2022-12-20 LAB
BASOPHILS # BLD AUTO: 0.03 K/UL (ref 0–0.2)
BASOPHILS NFR BLD: 0.4 % (ref 0–1.9)
DIFFERENTIAL METHOD: ABNORMAL
EOSINOPHIL # BLD AUTO: 0.1 K/UL (ref 0–0.5)
EOSINOPHIL NFR BLD: 1.2 % (ref 0–8)
ERYTHROCYTE [DISTWIDTH] IN BLOOD BY AUTOMATED COUNT: 14.3 % (ref 11.5–14.5)
HCT VFR BLD AUTO: 34.9 % (ref 37–48.5)
HGB BLD-MCNC: 11.6 G/DL (ref 12–16)
IMM GRANULOCYTES # BLD AUTO: 0.08 K/UL (ref 0–0.04)
IMM GRANULOCYTES NFR BLD AUTO: 1 % (ref 0–0.5)
LYMPHOCYTES # BLD AUTO: 2.2 K/UL (ref 1–4.8)
LYMPHOCYTES NFR BLD: 26 % (ref 18–48)
MCH RBC QN AUTO: 29.1 PG (ref 27–31)
MCHC RBC AUTO-ENTMCNC: 33.2 G/DL (ref 32–36)
MCV RBC AUTO: 88 FL (ref 82–98)
MONOCYTES # BLD AUTO: 0.6 K/UL (ref 0.3–1)
MONOCYTES NFR BLD: 6.8 % (ref 4–15)
NEUTROPHILS # BLD AUTO: 5.4 K/UL (ref 1.8–7.7)
NEUTROPHILS NFR BLD: 64.6 % (ref 38–73)
NRBC BLD-RTO: 0 /100 WBC
PLATELET # BLD AUTO: 221 K/UL (ref 150–450)
PMV BLD AUTO: 10.7 FL (ref 9.2–12.9)
RBC # BLD AUTO: 3.99 M/UL (ref 4–5.4)
WBC # BLD AUTO: 8.39 K/UL (ref 3.9–12.7)

## 2022-12-20 PROCEDURE — 86592 SYPHILIS TEST NON-TREP QUAL: CPT | Performed by: OBSTETRICS & GYNECOLOGY

## 2022-12-20 PROCEDURE — 87081 CULTURE SCREEN ONLY: CPT | Performed by: OBSTETRICS & GYNECOLOGY

## 2022-12-20 PROCEDURE — 99999 PR PBB SHADOW E&M-EST. PATIENT-LVL III: ICD-10-PCS | Mod: PBBFAC,,, | Performed by: OBSTETRICS & GYNECOLOGY

## 2022-12-20 PROCEDURE — 85025 COMPLETE CBC W/AUTO DIFF WBC: CPT | Performed by: OBSTETRICS & GYNECOLOGY

## 2022-12-20 PROCEDURE — 87389 HIV-1 AG W/HIV-1&-2 AB AG IA: CPT | Performed by: OBSTETRICS & GYNECOLOGY

## 2022-12-20 PROCEDURE — 99213 OFFICE O/P EST LOW 20 MIN: CPT | Mod: PBBFAC,TH,PO | Performed by: OBSTETRICS & GYNECOLOGY

## 2022-12-20 PROCEDURE — 99999 PR PBB SHADOW E&M-EST. PATIENT-LVL III: CPT | Mod: PBBFAC,,, | Performed by: OBSTETRICS & GYNECOLOGY

## 2022-12-20 PROCEDURE — 99214 PR OFFICE/OUTPT VISIT, EST, LEVL IV, 30-39 MIN: ICD-10-PCS | Mod: S$PBB,TH,, | Performed by: OBSTETRICS & GYNECOLOGY

## 2022-12-20 PROCEDURE — 80074 ACUTE HEPATITIS PANEL: CPT | Performed by: OBSTETRICS & GYNECOLOGY

## 2022-12-20 PROCEDURE — 36415 COLL VENOUS BLD VENIPUNCTURE: CPT | Performed by: OBSTETRICS & GYNECOLOGY

## 2022-12-20 PROCEDURE — 99214 OFFICE O/P EST MOD 30 MIN: CPT | Mod: S$PBB,TH,, | Performed by: OBSTETRICS & GYNECOLOGY

## 2022-12-20 RX ORDER — METFORMIN HYDROCHLORIDE 500 MG/1
500 TABLET ORAL 2 TIMES DAILY WITH MEALS
Qty: 90 TABLET | Refills: 1 | Status: ON HOLD | OUTPATIENT
Start: 2022-12-20 | End: 2023-01-13 | Stop reason: HOSPADM

## 2022-12-20 NOTE — PROGRESS NOTES
No complaints today   No nausea or vomiting   Denies vaginal bleeding or cramping     Not using the glucometer , did not go to the pharmacy to pick it up       /60   Wt 80.9 kg (178 lb 5.6 oz)   LMP 04/15/2022   BMI 29.68 kg/m²     Prenatal labs reviewed  Aneuploidy screen  : offered  Will do Quad next week  Anatomy US at 20 weeks   Consents=signed   Diabetes screen=Gestationla diabetes   TdaP= given today   GBBS @34-35W=   Growth US @ 36W  BC=       General Pregnancy  recommendations given  PNV + H2O intake    Blood sugar log=  Pre = not on target , 100-120   Post = not on target  120-150   Will start metformin 500 mgs BID   If  No success=> will switch to insulin     FU in 1weeks with Blood sugar log      Discussed nonpharmacological pain relief methods for labor, techniques and benefits of effective breastfeeding position and latch, and basic breastfeeding management. Encouraged patient to attend [facility] Prenatal Breastfeeding Class and to download [facility] My[facility]  mobile matthieu if she has not already done so. Patient verbalizes understanding.     I spent a total of 30 minutes on the day of the visit.This includes face to face time and non-face to face time preparing to see the patient (eg, review of tests), Obtaining and/or reviewing separately obtained history, Documenting clinical information in the electronic or other health record, Independently interpreting resultsand communicating results to the patient/family/caregiver, or Care coordination.          Roland Bryant M.D.   OB/GYN      Trace leuk

## 2022-12-21 LAB
HAV IGM SERPL QL IA: NORMAL
HBV CORE IGM SERPL QL IA: NORMAL
HBV SURFACE AG SERPL QL IA: NORMAL
HCV AB SERPL QL IA: NORMAL
HIV 1+2 AB+HIV1 P24 AG SERPL QL IA: NORMAL
RPR SER QL: NORMAL

## 2022-12-22 ENCOUNTER — PROCEDURE VISIT (OUTPATIENT)
Dept: OBSTETRICS AND GYNECOLOGY | Facility: CLINIC | Age: 27
End: 2022-12-22
Payer: MEDICAID

## 2022-12-22 DIAGNOSIS — Z3A.35 35 WEEKS GESTATION OF PREGNANCY: ICD-10-CM

## 2022-12-22 PROCEDURE — 76816 OB US FOLLOW-UP PER FETUS: CPT | Mod: PBBFAC,PO | Performed by: OBSTETRICS & GYNECOLOGY

## 2022-12-22 PROCEDURE — 76819 US OB/GYN PROCEDURE (VIEWPOINT): ICD-10-PCS | Mod: 26,S$PBB,, | Performed by: OBSTETRICS & GYNECOLOGY

## 2022-12-22 PROCEDURE — 76819 FETAL BIOPHYS PROFIL W/O NST: CPT | Mod: 26,S$PBB,, | Performed by: OBSTETRICS & GYNECOLOGY

## 2022-12-22 PROCEDURE — 76816 US OB/GYN PROCEDURE (VIEWPOINT): ICD-10-PCS | Mod: 26,S$PBB,, | Performed by: OBSTETRICS & GYNECOLOGY

## 2022-12-24 LAB — BACTERIA SPEC AEROBE CULT: NORMAL

## 2022-12-28 ENCOUNTER — HOSPITAL ENCOUNTER (OUTPATIENT)
Facility: HOSPITAL | Age: 27
Discharge: HOME | End: 2022-12-29
Attending: OBSTETRICS & GYNECOLOGY | Admitting: OBSTETRICS & GYNECOLOGY
Payer: MEDICAID

## 2022-12-28 DIAGNOSIS — Z3A.36 36 WEEKS GESTATION OF PREGNANCY: ICD-10-CM

## 2022-12-29 LAB
BACTERIA #/AREA URNS HPF: ABNORMAL /HPF
BILIRUB UR QL STRIP: NEGATIVE
CLARITY UR: ABNORMAL
COLOR UR: YELLOW
GLUCOSE UR QL STRIP: ABNORMAL
HGB UR QL STRIP: NEGATIVE
KETONES UR QL STRIP: ABNORMAL
LEUKOCYTE ESTERASE UR QL STRIP: ABNORMAL
MICROSCOPIC COMMENT: ABNORMAL
NITRITE UR QL STRIP: NEGATIVE
PH UR STRIP: 7 [PH] (ref 5–8)
PROT UR QL STRIP: NEGATIVE
SP GR UR STRIP: 1.02 (ref 1–1.03)
URN SPEC COLLECT METH UR: ABNORMAL
UROBILINOGEN UR STRIP-ACNC: NEGATIVE EU/DL
WBC #/AREA URNS HPF: 10 /HPF (ref 0–5)
YEAST URNS QL MICRO: ABNORMAL

## 2022-12-29 PROCEDURE — 63600175 PHARM REV CODE 636 W HCPCS: Performed by: OBSTETRICS & GYNECOLOGY

## 2022-12-29 PROCEDURE — 81000 URINALYSIS NONAUTO W/SCOPE: CPT | Performed by: OBSTETRICS & GYNECOLOGY

## 2022-12-29 RX ORDER — CEFAZOLIN SODIUM 2 G/50ML
2 SOLUTION INTRAVENOUS ONCE
Status: COMPLETED | OUTPATIENT
Start: 2022-12-29 | End: 2022-12-29

## 2022-12-29 RX ORDER — CEFAZOLIN SODIUM 1 G/50ML
1 SOLUTION INTRAVENOUS ONCE
Status: DISCONTINUED | OUTPATIENT
Start: 2022-12-29 | End: 2022-12-29

## 2022-12-29 RX ADMIN — CEFAZOLIN SODIUM 2 G: 2 SOLUTION INTRAVENOUS at 01:12

## 2022-12-29 NOTE — DISCHARGE INSTRUCTIONS
1 Dolor de hugo intenso que no se susan con sandra dosis de Tylenol.    2 Vision borrosa o tia manchas alina de karina ojos.    3 Ninchazon repentino en la justin o obdulio.     4 Aumento de peso en solo unos pocos marquez.    5 Dolor de estomago o calambres.    6 Vomito que dura mas de 24 horas.    7 Fiebre mas de 100.4 grados.    8 Sangrado vaginal que es algo mas que manonar.    9 Secrecion vaginal excesiva e in usual.    10 Un flujo de liquido acvoso de la vagina.    11 Avsencia de movmiento del lesvia significohiva comenzando en 24-36 semanas.    12 Menos de 37 semanas: mas de 4 contracciones en sandra hora por 2 horas    13 Mas de 37 semanas: contracciones coda 5 minutos por 2 horas.    14 Katt 8-10 vasos.    Sequimiento con ambrocio medico cadacita.

## 2022-12-29 NOTE — NURSING
2344- pt arrived to triage for complaints of lower abdominal pain and pressure as well as decreased FM. Pt states that she has been having pain for 2 days and it has gotten worse causing her to come in. Pt stated that when she goes to urinate she feels pressure but doesn't fully empty her bladder and experiences burning as well. Pt is a 36.6  for Dr Bryant. Irritability noted to toco , FHTs 145s and during placement pf monitor, FM noted and pt states she also feels it.  Pregnancy complicated by GDM pt states does not take any medication however Dr bryant noted the pt to start metformin 500mg BID.  Urine collected and RN will call MD on call.     0025- updated dr steward on pt arrival and complaint orders received. Will call back with UA results    59 UA resulted orders for 2g ancef now and to call in macrobid to pt pharmacy    0110- explained POC to pt using  [id number]. Pt understands.pt is ok to go home after ancef infusion. Explained discharge instructions at this time as well.    0140- discharge papers given to pt. Pt left ambulatory at this time to own ride.

## 2023-01-03 ENCOUNTER — ROUTINE PRENATAL (OUTPATIENT)
Dept: OBSTETRICS AND GYNECOLOGY | Facility: CLINIC | Age: 28
End: 2023-01-03
Payer: MEDICAID

## 2023-01-03 VITALS
BODY MASS INDEX: 29.97 KG/M2 | SYSTOLIC BLOOD PRESSURE: 110 MMHG | DIASTOLIC BLOOD PRESSURE: 69 MMHG | WEIGHT: 180.13 LBS

## 2023-01-03 DIAGNOSIS — Z34.83 ENCOUNTER FOR SUPERVISION OF OTHER NORMAL PREGNANCY IN THIRD TRIMESTER: ICD-10-CM

## 2023-01-03 DIAGNOSIS — Z3A.37 37 WEEKS GESTATION OF PREGNANCY: Primary | ICD-10-CM

## 2023-01-03 DIAGNOSIS — O24.415 GESTATIONAL DIABETES MELLITUS (GDM) IN THIRD TRIMESTER CONTROLLED ON ORAL HYPOGLYCEMIC DRUG: ICD-10-CM

## 2023-01-03 PROCEDURE — 99214 PR OFFICE/OUTPT VISIT, EST, LEVL IV, 30-39 MIN: ICD-10-PCS | Mod: S$PBB,TH,, | Performed by: OBSTETRICS & GYNECOLOGY

## 2023-01-03 PROCEDURE — 99999 PR PBB SHADOW E&M-EST. PATIENT-LVL III: ICD-10-PCS | Mod: PBBFAC,,, | Performed by: OBSTETRICS & GYNECOLOGY

## 2023-01-03 PROCEDURE — 99999 PR PBB SHADOW E&M-EST. PATIENT-LVL III: CPT | Mod: PBBFAC,,, | Performed by: OBSTETRICS & GYNECOLOGY

## 2023-01-03 PROCEDURE — 99213 OFFICE O/P EST LOW 20 MIN: CPT | Mod: PBBFAC,TH,PO | Performed by: OBSTETRICS & GYNECOLOGY

## 2023-01-03 PROCEDURE — 99214 OFFICE O/P EST MOD 30 MIN: CPT | Mod: S$PBB,TH,, | Performed by: OBSTETRICS & GYNECOLOGY

## 2023-01-03 NOTE — PROGRESS NOTES
No complaints today   No nausea or vomiting   Denies vaginal bleeding or cramping     Not using the glucometer , did not go to the pharmacy to pick it up       /69   Wt 81.7 kg (180 lb 1.9 oz)   LMP 04/15/2022   BMI 29.97 kg/m²     Prenatal labs reviewed  Aneuploidy screen  : offered  Will do Quad next week  Anatomy US at 20 weeks   Consents=signed   Diabetes screen=Gestationla diabetes   TdaP= given   GBBS @34-35W= negative   Growth US @ 36W  BC=       General Pregnancy  recommendations given  PNV + H2O intake    Blood sugar log= did not bring it today     On 12/20/23  Pre = not on target , 100-120   Post = not on target  120-150   Will start metformin 500 mgs BID   If  No success=> will switch to insulin (patient declined  today)     FU in 1weeks with Blood sugar log      Discussed nonpharmacological pain relief methods for labor, techniques and benefits of effective breastfeeding position and latch, and basic breastfeeding management. Encouraged patient to attend [facility] P[facility]feeding Class and to download [facility]  mobile matthieu if she has not already done so. Patient verbalizes understanding.     I spent a total of 30 minutes on the day of the visit.This includes face to face time and non-face to face time preparing to see the patient (eg, review of tests), Obtaining and/or reviewing separately obtained history, Documenting clinical information in the electronic or other health record, Independently interpreting resultsand communicating results to the patient/family/caregiver, or Care coordination.          Roland Bryant M.D.   OB/GYN      Trace leuk

## 2023-01-05 ENCOUNTER — TELEPHONE (OUTPATIENT)
Dept: OBSTETRICS AND GYNECOLOGY | Facility: CLINIC | Age: 28
End: 2023-01-05
Payer: MEDICAID

## 2023-01-05 NOTE — PROGRESS NOTES
Available prenatal labs reviewed today are within normal limits of pregnancy     Roland Bryant M.D.   OB/GYN    1/4/2023

## 2023-01-10 ENCOUNTER — ROUTINE PRENATAL (OUTPATIENT)
Dept: OBSTETRICS AND GYNECOLOGY | Facility: CLINIC | Age: 28
End: 2023-01-10
Payer: MEDICAID

## 2023-01-10 VITALS
BODY MASS INDEX: 30.56 KG/M2 | DIASTOLIC BLOOD PRESSURE: 75 MMHG | SYSTOLIC BLOOD PRESSURE: 111 MMHG | WEIGHT: 183.63 LBS

## 2023-01-10 DIAGNOSIS — Z3A.38 38 WEEKS GESTATION OF PREGNANCY: Primary | ICD-10-CM

## 2023-01-10 DIAGNOSIS — Z34.83 ENCOUNTER FOR SUPERVISION OF OTHER NORMAL PREGNANCY IN THIRD TRIMESTER: ICD-10-CM

## 2023-01-10 DIAGNOSIS — O24.415 GESTATIONAL DIABETES MELLITUS (GDM) IN THIRD TRIMESTER CONTROLLED ON ORAL HYPOGLYCEMIC DRUG: ICD-10-CM

## 2023-01-10 PROCEDURE — 99999 PR PBB SHADOW E&M-EST. PATIENT-LVL III: ICD-10-PCS | Mod: PBBFAC,,, | Performed by: OBSTETRICS & GYNECOLOGY

## 2023-01-10 PROCEDURE — 99213 OFFICE O/P EST LOW 20 MIN: CPT | Mod: PBBFAC,TH,PO | Performed by: OBSTETRICS & GYNECOLOGY

## 2023-01-10 PROCEDURE — 99999 PR PBB SHADOW E&M-EST. PATIENT-LVL III: CPT | Mod: PBBFAC,,, | Performed by: OBSTETRICS & GYNECOLOGY

## 2023-01-10 PROCEDURE — 99214 PR OFFICE/OUTPT VISIT, EST, LEVL IV, 30-39 MIN: ICD-10-PCS | Mod: S$PBB,TH,, | Performed by: OBSTETRICS & GYNECOLOGY

## 2023-01-10 PROCEDURE — 99214 OFFICE O/P EST MOD 30 MIN: CPT | Mod: S$PBB,TH,, | Performed by: OBSTETRICS & GYNECOLOGY

## 2023-01-10 NOTE — PROGRESS NOTES
No complaints today   No nausea or vomiting   Denies vaginal bleeding or cramping     On metformin 500 BID   Brings Blood sugar log  BS numbers much improved  All pre prandial on target                                                Post prandial 120-144 most around 130      /75   Wt 83.3 kg (183 lb 10.3 oz)   LMP 04/15/2022   BMI 30.56 kg/m²     Prenatal labs reviewed  Aneuploidy screen  : offered  Will do Quad next week  Anatomy US at 20 weeks   Consents=signed   Diabetes screen=Gestationla diabetes   TdaP= given   GBBS @34-35W= negative   Growth US @ 36W  BC=       General Pregnancy  recommendations given  PNV + H2O intake    Blood sugar log= did not bring it today     On 12/20/23  Pre = not on target , 100-120   Post = not on target  120-150   Will start metformin 500 mgs BID   If  No success=> will switch to insulin (patient declined  today)     FU in 1weeks with Blood sugar log      Discussed nonpharmacological pain relief methods for labor, techniques and benefits of effective breastfeeding position and latch, and basic breastfeeding management. Encouraged patient to attend [facility] Pren[facility] Breastfeeding Class and to download [facility] My[facility]  mobile matthieu if she has not already done so. Patient verbalizes understanding.     I spent a total of 30 minutes on the day of the visit.This includes face to face time and non-face to face time preparing to see the patient (eg, review of tests), Obtaining and/or reviewing separately obtained history, Documenting clinical information in the electronic or other health record, Independently interpreting resultsand communicating results to the patient/family/caregiver, or Care coordination.          Roland Bryant M.D.   OB/GYN      Trace leuk

## 2023-01-11 ENCOUNTER — ANESTHESIA (OUTPATIENT)
Dept: OBSTETRICS AND GYNECOLOGY | Facility: HOSPITAL | Age: 28
End: 2023-01-11
Payer: MEDICAID

## 2023-01-11 ENCOUNTER — ANESTHESIA EVENT (OUTPATIENT)
Dept: OBSTETRICS AND GYNECOLOGY | Facility: HOSPITAL | Age: 28
End: 2023-01-11
Payer: MEDICAID

## 2023-01-11 ENCOUNTER — HOSPITAL ENCOUNTER (INPATIENT)
Facility: HOSPITAL | Age: 28
LOS: 2 days | Discharge: HOME | End: 2023-01-13
Attending: OBSTETRICS & GYNECOLOGY | Admitting: OBSTETRICS & GYNECOLOGY
Payer: MEDICAID

## 2023-01-11 DIAGNOSIS — Z34.90 TERM PREGNANCY: ICD-10-CM

## 2023-01-11 DIAGNOSIS — D64.9 CHRONIC ANEMIA: Primary | ICD-10-CM

## 2023-01-11 DIAGNOSIS — O36.8190 DECREASED FETAL MOVEMENT: ICD-10-CM

## 2023-01-11 DIAGNOSIS — Z30.2 ENCOUNTER FOR TUBAL LIGATION: ICD-10-CM

## 2023-01-11 DIAGNOSIS — R10.9 ABDOMINAL PAIN: ICD-10-CM

## 2023-01-11 LAB
ABO + RH BLD: NORMAL
ALBUMIN SERPL BCP-MCNC: 2.6 G/DL (ref 3.5–5.2)
ALP SERPL-CCNC: 125 U/L (ref 55–135)
ALT SERPL W/O P-5'-P-CCNC: 28 U/L (ref 10–44)
ANION GAP SERPL CALC-SCNC: 10 MMOL/L (ref 8–16)
AST SERPL-CCNC: 20 U/L (ref 10–40)
BASOPHILS # BLD AUTO: 0.01 K/UL (ref 0–0.2)
BASOPHILS NFR BLD: 0.1 % (ref 0–1.9)
BILIRUB SERPL-MCNC: 0.3 MG/DL (ref 0.1–1)
BLD GP AB SCN CELLS X3 SERPL QL: NORMAL
BUN SERPL-MCNC: 9 MG/DL (ref 6–20)
CALCIUM SERPL-MCNC: 8.5 MG/DL (ref 8.7–10.5)
CHLORIDE SERPL-SCNC: 105 MMOL/L (ref 95–110)
CO2 SERPL-SCNC: 19 MMOL/L (ref 23–29)
CREAT SERPL-MCNC: 0.6 MG/DL (ref 0.5–1.4)
DIFFERENTIAL METHOD: ABNORMAL
EOSINOPHIL # BLD AUTO: 0 K/UL (ref 0–0.5)
EOSINOPHIL NFR BLD: 0.4 % (ref 0–8)
ERYTHROCYTE [DISTWIDTH] IN BLOOD BY AUTOMATED COUNT: 14.1 % (ref 11.5–14.5)
EST. GFR  (NO RACE VARIABLE): >60 ML/MIN/1.73 M^2
GLUCOSE SERPL-MCNC: 195 MG/DL (ref 70–110)
HCT VFR BLD AUTO: 35.1 % (ref 37–48.5)
HGB BLD-MCNC: 11.4 G/DL (ref 12–16)
IMM GRANULOCYTES # BLD AUTO: 0.07 K/UL (ref 0–0.04)
IMM GRANULOCYTES NFR BLD AUTO: 1 % (ref 0–0.5)
LYMPHOCYTES # BLD AUTO: 1.9 K/UL (ref 1–4.8)
LYMPHOCYTES NFR BLD: 26.3 % (ref 18–48)
MCH RBC QN AUTO: 28.2 PG (ref 27–31)
MCHC RBC AUTO-ENTMCNC: 32.5 G/DL (ref 32–36)
MCV RBC AUTO: 87 FL (ref 82–98)
MONOCYTES # BLD AUTO: 0.4 K/UL (ref 0.3–1)
MONOCYTES NFR BLD: 5.9 % (ref 4–15)
NEUTROPHILS # BLD AUTO: 4.9 K/UL (ref 1.8–7.7)
NEUTROPHILS NFR BLD: 66.3 % (ref 38–73)
NRBC BLD-RTO: 0 /100 WBC
PLATELET # BLD AUTO: 204 K/UL (ref 150–450)
PMV BLD AUTO: 10.8 FL (ref 9.2–12.9)
POTASSIUM SERPL-SCNC: 3.7 MMOL/L (ref 3.5–5.1)
PROT SERPL-MCNC: 5.9 G/DL (ref 6–8.4)
RBC # BLD AUTO: 4.04 M/UL (ref 4–5.4)
SODIUM SERPL-SCNC: 134 MMOL/L (ref 136–145)
WBC # BLD AUTO: 7.33 K/UL (ref 3.9–12.7)

## 2023-01-11 PROCEDURE — 80053 COMPREHEN METABOLIC PANEL: CPT | Performed by: OBSTETRICS & GYNECOLOGY

## 2023-01-11 PROCEDURE — 27200710 HC EPIDURAL INFUSION PUMP SET: Performed by: ANESTHESIOLOGY

## 2023-01-11 PROCEDURE — 59409 PRA ETRICAL CARE,VAG DELIV ONLY: ICD-10-PCS | Mod: AA,,, | Performed by: ANESTHESIOLOGY

## 2023-01-11 PROCEDURE — C1751 CATH, INF, PER/CENT/MIDLINE: HCPCS | Performed by: ANESTHESIOLOGY

## 2023-01-11 PROCEDURE — 62326 NJX INTERLAMINAR LMBR/SAC: CPT | Performed by: ANESTHESIOLOGY

## 2023-01-11 PROCEDURE — 86900 BLOOD TYPING SEROLOGIC ABO: CPT | Performed by: OBSTETRICS & GYNECOLOGY

## 2023-01-11 PROCEDURE — 63600175 PHARM REV CODE 636 W HCPCS: Performed by: OBSTETRICS & GYNECOLOGY

## 2023-01-11 PROCEDURE — 11000001 HC ACUTE MED/SURG PRIVATE ROOM

## 2023-01-11 PROCEDURE — 85025 COMPLETE CBC W/AUTO DIFF WBC: CPT | Performed by: OBSTETRICS & GYNECOLOGY

## 2023-01-11 PROCEDURE — 59409 OBSTETRICAL CARE: CPT | Mod: AA,,, | Performed by: ANESTHESIOLOGY

## 2023-01-11 PROCEDURE — 27000181 HC CABLE, IUPC

## 2023-01-11 PROCEDURE — 72100002 HC LABOR CARE, 1ST 8 HOURS

## 2023-01-11 PROCEDURE — 59409 PR OBSTETRICAL CARE,VAG DELIV ONLY: ICD-10-PCS | Mod: AT,,, | Performed by: OBSTETRICS & GYNECOLOGY

## 2023-01-11 PROCEDURE — 51702 INSERT TEMP BLADDER CATH: CPT

## 2023-01-11 PROCEDURE — 63600175 PHARM REV CODE 636 W HCPCS: Performed by: NURSE ANESTHETIST, CERTIFIED REGISTERED

## 2023-01-11 PROCEDURE — 63600175 PHARM REV CODE 636 W HCPCS

## 2023-01-11 PROCEDURE — 25000003 PHARM REV CODE 250: Performed by: ANESTHESIOLOGY

## 2023-01-11 PROCEDURE — 25000003 PHARM REV CODE 250

## 2023-01-11 PROCEDURE — 72200005 HC VAGINAL DELIVERY LEVEL II

## 2023-01-11 PROCEDURE — 59409 OBSTETRICAL CARE: CPT | Mod: AT,,, | Performed by: OBSTETRICS & GYNECOLOGY

## 2023-01-11 PROCEDURE — 99211 OFF/OP EST MAY X REQ PHY/QHP: CPT

## 2023-01-11 RX ORDER — OXYTOCIN/RINGER'S LACTATE 30/500 ML
95 PLASTIC BAG, INJECTION (ML) INTRAVENOUS ONCE AS NEEDED
Status: DISCONTINUED | OUTPATIENT
Start: 2023-01-11 | End: 2023-01-13 | Stop reason: HOSPADM

## 2023-01-11 RX ORDER — SODIUM CITRATE AND CITRIC ACID MONOHYDRATE 334; 500 MG/5ML; MG/5ML
30 SOLUTION ORAL ONCE
Status: DISCONTINUED | OUTPATIENT
Start: 2023-01-11 | End: 2023-01-13 | Stop reason: HOSPADM

## 2023-01-11 RX ORDER — LIDOCAINE HYDROCHLORIDE 10 MG/ML
10 INJECTION INFILTRATION; PERINEURAL ONCE AS NEEDED
Status: DISCONTINUED | OUTPATIENT
Start: 2023-01-11 | End: 2023-01-13 | Stop reason: HOSPADM

## 2023-01-11 RX ORDER — OXYTOCIN/RINGER'S LACTATE 30/500 ML
0-30 PLASTIC BAG, INJECTION (ML) INTRAVENOUS CONTINUOUS
Status: DISCONTINUED | OUTPATIENT
Start: 2023-01-11 | End: 2023-01-11

## 2023-01-11 RX ORDER — ONDANSETRON 8 MG/1
8 TABLET, ORALLY DISINTEGRATING ORAL EVERY 8 HOURS PRN
Status: DISCONTINUED | OUTPATIENT
Start: 2023-01-11 | End: 2023-01-13 | Stop reason: HOSPADM

## 2023-01-11 RX ORDER — DIPHENOXYLATE HYDROCHLORIDE AND ATROPINE SULFATE 2.5; .025 MG/1; MG/1
1 TABLET ORAL 4 TIMES DAILY PRN
Status: DISCONTINUED | OUTPATIENT
Start: 2023-01-11 | End: 2023-01-13 | Stop reason: HOSPADM

## 2023-01-11 RX ORDER — FENTANYL CITRATE 50 UG/ML
INJECTION, SOLUTION INTRAMUSCULAR; INTRAVENOUS
Status: DISCONTINUED | OUTPATIENT
Start: 2023-01-11 | End: 2023-01-11

## 2023-01-11 RX ORDER — OXYTOCIN/RINGER'S LACTATE 30/500 ML
334 PLASTIC BAG, INJECTION (ML) INTRAVENOUS ONCE AS NEEDED
Status: DISCONTINUED | OUTPATIENT
Start: 2023-01-11 | End: 2023-01-13 | Stop reason: HOSPADM

## 2023-01-11 RX ORDER — OXYTOCIN/RINGER'S LACTATE 30/500 ML
334 PLASTIC BAG, INJECTION (ML) INTRAVENOUS ONCE
Status: DISCONTINUED | OUTPATIENT
Start: 2023-01-11 | End: 2023-01-13 | Stop reason: HOSPADM

## 2023-01-11 RX ORDER — CARBOPROST TROMETHAMINE 250 UG/ML
250 INJECTION, SOLUTION INTRAMUSCULAR
Status: DISCONTINUED | OUTPATIENT
Start: 2023-01-11 | End: 2023-01-13 | Stop reason: HOSPADM

## 2023-01-11 RX ORDER — OXYTOCIN 10 [USP'U]/ML
10 INJECTION, SOLUTION INTRAMUSCULAR; INTRAVENOUS ONCE AS NEEDED
Status: DISCONTINUED | OUTPATIENT
Start: 2023-01-11 | End: 2023-01-13 | Stop reason: HOSPADM

## 2023-01-11 RX ORDER — SIMETHICONE 80 MG
1 TABLET,CHEWABLE ORAL EVERY 6 HOURS PRN
Status: DISCONTINUED | OUTPATIENT
Start: 2023-01-11 | End: 2023-01-13 | Stop reason: HOSPADM

## 2023-01-11 RX ORDER — OXYTOCIN/RINGER'S LACTATE 30/500 ML
95 PLASTIC BAG, INJECTION (ML) INTRAVENOUS ONCE
Status: DISCONTINUED | OUTPATIENT
Start: 2023-01-11 | End: 2023-01-13 | Stop reason: HOSPADM

## 2023-01-11 RX ORDER — SODIUM CHLORIDE 9 MG/ML
INJECTION, SOLUTION INTRAVENOUS CONTINUOUS
Status: DISCONTINUED | OUTPATIENT
Start: 2023-01-11 | End: 2023-01-13 | Stop reason: HOSPADM

## 2023-01-11 RX ORDER — TRANEXAMIC ACID 100 MG/ML
1000 INJECTION, SOLUTION INTRAVENOUS ONCE AS NEEDED
Status: DISCONTINUED | OUTPATIENT
Start: 2023-01-11 | End: 2023-01-13 | Stop reason: HOSPADM

## 2023-01-11 RX ORDER — MUPIROCIN 20 MG/G
OINTMENT TOPICAL
Status: DISCONTINUED | OUTPATIENT
Start: 2023-01-11 | End: 2023-01-11

## 2023-01-11 RX ORDER — MISOPROSTOL 200 UG/1
800 TABLET ORAL ONCE AS NEEDED
Status: COMPLETED | OUTPATIENT
Start: 2023-01-11 | End: 2023-01-11

## 2023-01-11 RX ORDER — ACETAMINOPHEN 325 MG/1
650 TABLET ORAL EVERY 6 HOURS PRN
Status: DISCONTINUED | OUTPATIENT
Start: 2023-01-11 | End: 2023-01-13 | Stop reason: HOSPADM

## 2023-01-11 RX ORDER — OXYTOCIN 10 [USP'U]/ML
INJECTION, SOLUTION INTRAMUSCULAR; INTRAVENOUS
Status: DISCONTINUED
Start: 2023-01-11 | End: 2023-01-11 | Stop reason: WASHOUT

## 2023-01-11 RX ORDER — PROCHLORPERAZINE EDISYLATE 5 MG/ML
5 INJECTION INTRAMUSCULAR; INTRAVENOUS EVERY 6 HOURS PRN
Status: DISCONTINUED | OUTPATIENT
Start: 2023-01-11 | End: 2023-01-13 | Stop reason: HOSPADM

## 2023-01-11 RX ORDER — DIPHENHYDRAMINE HCL 25 MG
25 CAPSULE ORAL EVERY 4 HOURS PRN
Status: DISCONTINUED | OUTPATIENT
Start: 2023-01-11 | End: 2023-01-13 | Stop reason: HOSPADM

## 2023-01-11 RX ORDER — LIDOCAINE HYDROCHLORIDE AND EPINEPHRINE 15; 5 MG/ML; UG/ML
INJECTION, SOLUTION EPIDURAL
Status: DISCONTINUED | OUTPATIENT
Start: 2023-01-11 | End: 2023-01-11

## 2023-01-11 RX ORDER — NAPROXEN 500 MG/1
500 TABLET ORAL EVERY 8 HOURS PRN
Status: DISCONTINUED | OUTPATIENT
Start: 2023-01-11 | End: 2023-01-13 | Stop reason: HOSPADM

## 2023-01-11 RX ORDER — PRENATAL WITH FERROUS FUM AND FOLIC ACID 3080; 920; 120; 400; 22; 1.84; 3; 20; 10; 1; 12; 200; 27; 25; 2 [IU]/1; [IU]/1; MG/1; [IU]/1; MG/1; MG/1; MG/1; MG/1; MG/1; MG/1; UG/1; MG/1; MG/1; MG/1; MG/1
1 TABLET ORAL DAILY
Status: DISCONTINUED | OUTPATIENT
Start: 2023-01-12 | End: 2023-01-13 | Stop reason: HOSPADM

## 2023-01-11 RX ORDER — HYDROCORTISONE 25 MG/G
CREAM TOPICAL 3 TIMES DAILY PRN
Status: DISCONTINUED | OUTPATIENT
Start: 2023-01-11 | End: 2023-01-13 | Stop reason: HOSPADM

## 2023-01-11 RX ORDER — CALCIUM CARBONATE 200(500)MG
500 TABLET,CHEWABLE ORAL 3 TIMES DAILY PRN
Status: DISCONTINUED | OUTPATIENT
Start: 2023-01-11 | End: 2023-01-13 | Stop reason: HOSPADM

## 2023-01-11 RX ORDER — DOCUSATE SODIUM 100 MG/1
200 CAPSULE, LIQUID FILLED ORAL 2 TIMES DAILY PRN
Status: DISCONTINUED | OUTPATIENT
Start: 2023-01-11 | End: 2023-01-13 | Stop reason: HOSPADM

## 2023-01-11 RX ORDER — METHYLERGONOVINE MALEATE 0.2 MG/ML
INJECTION INTRAVENOUS
Status: COMPLETED
Start: 2023-01-11 | End: 2023-01-11

## 2023-01-11 RX ORDER — OXYCODONE AND ACETAMINOPHEN 5; 325 MG/1; MG/1
1 TABLET ORAL EVERY 4 HOURS PRN
Status: DISCONTINUED | OUTPATIENT
Start: 2023-01-11 | End: 2023-01-13 | Stop reason: HOSPADM

## 2023-01-11 RX ORDER — OXYTOCIN/RINGER'S LACTATE 30/500 ML
0-30 PLASTIC BAG, INJECTION (ML) INTRAVENOUS CONTINUOUS
Status: DISCONTINUED | OUTPATIENT
Start: 2023-01-11 | End: 2023-01-13 | Stop reason: HOSPADM

## 2023-01-11 RX ORDER — METHYLERGONOVINE MALEATE 0.2 MG/ML
200 INJECTION INTRAVENOUS
Status: DISCONTINUED | OUTPATIENT
Start: 2023-01-11 | End: 2023-01-13 | Stop reason: HOSPADM

## 2023-01-11 RX ORDER — SIMETHICONE 80 MG
1 TABLET,CHEWABLE ORAL 4 TIMES DAILY PRN
Status: DISCONTINUED | OUTPATIENT
Start: 2023-01-11 | End: 2023-01-13 | Stop reason: HOSPADM

## 2023-01-11 RX ORDER — MISOPROSTOL 200 UG/1
800 TABLET ORAL ONCE AS NEEDED
Status: DISCONTINUED | OUTPATIENT
Start: 2023-01-11 | End: 2023-01-13 | Stop reason: HOSPADM

## 2023-01-11 RX ORDER — FAMOTIDINE 10 MG/ML
20 INJECTION INTRAVENOUS ONCE
Status: DISCONTINUED | OUTPATIENT
Start: 2023-01-11 | End: 2023-01-13 | Stop reason: HOSPADM

## 2023-01-11 RX ORDER — MISOPROSTOL 200 UG/1
TABLET ORAL
Status: COMPLETED
Start: 2023-01-11 | End: 2023-01-11

## 2023-01-11 RX ORDER — SODIUM CHLORIDE 9 MG/ML
INJECTION, SOLUTION INTRAVENOUS
Status: DISCONTINUED | OUTPATIENT
Start: 2023-01-11 | End: 2023-01-13 | Stop reason: HOSPADM

## 2023-01-11 RX ORDER — MISOPROSTOL 200 UG/1
800 TABLET ORAL
Status: DISCONTINUED | OUTPATIENT
Start: 2023-01-11 | End: 2023-01-13 | Stop reason: HOSPADM

## 2023-01-11 RX ORDER — FENTANYL/BUPIVACAINE/NS/PF 2MCG/ML-.1
PLASTIC BAG, INJECTION (ML) INJECTION CONTINUOUS
Status: DISCONTINUED | OUTPATIENT
Start: 2023-01-11 | End: 2023-01-13 | Stop reason: HOSPADM

## 2023-01-11 RX ORDER — FENTANYL/BUPIVACAINE/NS/PF 2MCG/ML-.1
PLASTIC BAG, INJECTION (ML) INJECTION
Status: COMPLETED
Start: 2023-01-11 | End: 2023-01-11

## 2023-01-11 RX ORDER — SODIUM CHLORIDE, SODIUM LACTATE, POTASSIUM CHLORIDE, CALCIUM CHLORIDE 600; 310; 30; 20 MG/100ML; MG/100ML; MG/100ML; MG/100ML
INJECTION, SOLUTION INTRAVENOUS CONTINUOUS
Status: DISCONTINUED | OUTPATIENT
Start: 2023-01-11 | End: 2023-01-13 | Stop reason: HOSPADM

## 2023-01-11 RX ORDER — DIPHENHYDRAMINE HYDROCHLORIDE 50 MG/ML
25 INJECTION INTRAMUSCULAR; INTRAVENOUS EVERY 4 HOURS PRN
Status: DISCONTINUED | OUTPATIENT
Start: 2023-01-11 | End: 2023-01-13 | Stop reason: HOSPADM

## 2023-01-11 RX ORDER — CARBOPROST TROMETHAMINE 250 UG/ML
INJECTION, SOLUTION INTRAMUSCULAR
Status: DISCONTINUED
Start: 2023-01-11 | End: 2023-01-11 | Stop reason: WASHOUT

## 2023-01-11 RX ADMIN — LIDOCAINE HYDROCHLORIDE,EPINEPHRINE BITARTRATE 3 ML: 15; .005 INJECTION, SOLUTION EPIDURAL; INFILTRATION; INTRACAUDAL; PERINEURAL at 10:01

## 2023-01-11 RX ADMIN — Medication 5 ML: at 10:01

## 2023-01-11 RX ADMIN — SODIUM CHLORIDE, SODIUM LACTATE, POTASSIUM CHLORIDE, CALCIUM CHLORIDE: 600; 310; 30; 20 INJECTION, SOLUTION INTRAVENOUS at 09:01

## 2023-01-11 RX ADMIN — Medication 10 ML/HR: at 10:01

## 2023-01-11 RX ADMIN — Medication 2 MILLI-UNITS/MIN: at 02:01

## 2023-01-11 RX ADMIN — METHYLERGONOVINE MALEATE 200 MCG: 0.2 INJECTION INTRAVENOUS at 05:01

## 2023-01-11 RX ADMIN — FENTANYL CITRATE 100 MCG: 0.05 INJECTION, SOLUTION INTRAMUSCULAR; INTRAVENOUS at 02:01

## 2023-01-11 RX ADMIN — MISOPROSTOL 800 MCG: 200 TABLET ORAL at 05:01

## 2023-01-11 NOTE — PROGRESS NOTES
DATE: 2023    S: 27 y.o.  at 38w5d   O:   BP: (!) 105/58    FHT: 145-155 x; Mod variability , Cat 1   [place]/IUPC: q 2-3 minutes   SVE:9/80 -0   Clear fluid       ASSESSMENT: 38w5d   Patient Active Problem List   Diagnosis    Abdominal pain    Decreased fetal movement     (normal spontaneous vaginal delivery)    Chronic anemia       PLAN:    Continue Close Maternal/Fetal Monitoring  Pitocin Augmentation per protocol  Recheck 1 hours or PRN    Roland Bryant M.D.   OB/GYN

## 2023-01-11 NOTE — NURSING
Patient c/o pain on her left side.  Patient has pushed her epidural button several times with no Releaf.  Patient is lying on her left side. Eran with anesthesia advised.

## 2023-01-11 NOTE — L&D DELIVERY NOTE
[facility] - Labor & Delivery  Vaginal Delivery   Operative Note    DELIVERY NOTE:    After complete dilatation and +2 station  Patient pushes x 15 minutes , delivering    One viable infant sex   ( x )M / ( -) F    , in OA position   Umbilical cord was clamped and cut  , cord  blood sample was  collected   Uterine cavity manually revised for placenta.membranes and clots     Nuchal cord= NO (x)  YES (-)  Shoulder dystocia= NO   Apgar 9/9  Amniotic fluid= clear   Placenta= normal intact , complete   Umbilical Cord= 3 vessels     No cervical tears   Episiotomy =         Yes ( - )      NO  (  X)   Perineal tears =     Yes( -)    NO  ( x )  Vaginal tears=       Yes ( - )     NO  ( X)    EBL =  650cc  Sponge, lap, needle counts were correct   Vaginal sweep performed     Uterine atony   One dose of Methergin SC    600 mcgs misoprostol per rectum   Abraham Bryant M.D.   OB/GYN      Indications:  (normal spontaneous vaginal delivery)  Pregnancy complicated by:   Patient Active Problem List   Diagnosis    Abdominal pain    Decreased fetal movement     (normal spontaneous vaginal delivery)    Chronic anemia     Admitting GA: 38w5d    Delivery Information for Gus Nava    Birth information:  YOB: 2023   Time of birth: 5:41 PM   Sex: male   Head Delivery Date/Time:     Delivery type:    Gestational Age: 38w5d    Delivery Providers    Delivering clinician:            Measurements    Weight:   Length:          Apgars    Living status:   Apgars:  1 min.:  5 min.:  10 min.:  15 min.:  20 min.:    Skin color:         Heart rate:         Reflex irritability:         Muscle tone:         Respiratory effort:         Total:                                  Interventions/Resuscitation           Cord    No data filed       Placenta    Placenta delivery date/time:   Placenta removal:            Labor Events:       labor:       Labor Onset Date/Time:         Dilation Complete  Date/Time:         Start Pushing Date/Time:         Start Pushing Date/Time:       Rupture Date/Time: 23  1100         Rupture type:            Fluid Amount:         Fluid Color: Clear        Fluid Odor:         Membrane Status: ARM (Artificial Rupture)                 steroids:       Antibiotics given for GBS:       Induction:       Indications for induction:        Augmentation:       Indications for augmentation:       Labor complications:       Additional complications:          Cervical ripening:                     Delivery:      Episiotomy:       Indication for Episiotomy:       Perineal Lacerations:   Repaired:      Periurethral Laceration:   Repaired:     Labial Laceration:   Repaired:     Sulcus Laceration:   Repaired:     Vaginal Laceration:   Repaired:     Cervical Laceration:   Repaired:     Repair suture:       Repair # of packets:       Last Value - EBL - Nursing (mL):       Sum - EBL - Nursing (mL): 0     Last Value - EBL - Anesthesia (mL):        Calculated QBL (mL):         Vaginal Sweep Performed:       Surgicount Correct:         Other providers:            Details (if applicable):  Trial of Labor      Categorization:      Priority:     Indications for :     Incision Type:       Additional  information:  Forceps:    Vacuum:    Breech:    Observed anomalies    Other (Comments):

## 2023-01-11 NOTE — NURSING
Patient came in stating she was simone every 5-8 min.   # [id number] Jazmine used to communicate with the patient.  During my assessment with the patient Dr Bryant came in.  I discontinuing using the uinterpreter.  Dr Manny SIMON 5/50/-3 per Dr Bryant admit the patient.  Dr Bryant made the patient advised we do not have a labor room at this time, because they are all full.  Patient voiced understanding.  Orders placed for admit.

## 2023-01-11 NOTE — ANESTHESIA PROCEDURE NOTES
Epidural    Patient location during procedure: OB   Reason for block: primary anesthetic   Reason for block: labor analgesia requested by patient and obstetrician  Diagnosis: iup   Start time: 1/11/2023 10:18 AM  Timeout: 1/11/2023 10:12 AM  End time: 1/11/2023 10:22 AM    Staffing  Performing Provider: Yara Angela MD  Authorizing Provider: Yara Angela MD        Preanesthetic Checklist  Completed: patient identified, IV checked, site marked, risks and benefits discussed, surgical consent, monitors and equipment checked, pre-op evaluation, timeout performed, anesthesia consent given, hand hygiene performed and patient being monitored  Preparation  Patient position: sitting  Prep: ChloraPrep  Patient monitoring: Pulse Ox and Blood Pressure  Reason for block: primary anesthetic   Epidural  Skin Anesthetic: lidocaine 1%  Skin Wheal: 3 mL  Administration type: continuous  Approach: midline  Interspace: L3-4    Injection technique: KYLE air  Needle and Epidural Catheter  Needle type: Tuohy   Needle gauge: 17  Needle length: 3.5 inches  Needle insertion depth: 5 cm  Catheter type: springwound and multi-orifice  Catheter size: 19 G  Catheter at skin depth: 9 cm  Insertion Attempts: 1  Test dose: 3 mL of lidocaine 1.5% with Epi 1-to-200,000  Additional Documentation: incremental injection, negative aspiration for heme and CSF, no paresthesia on injection and no signs/symptoms of IV or SA injection  Needle localization: anatomical landmarks  Assessment  Ease of block: easy  Patient's tolerance of the procedure: comfortable throughout block and no complaints No inadvertent dural puncture with Tuohy.  Dural puncture performed with spinal needle.

## 2023-01-11 NOTE — NURSING
Dr Bryant at bedside for patient exam. AROM performed by MD, IUPC placed. SVE 6cm/50%/-2. If no change by next cervical exam, start pitocin per MD.

## 2023-01-11 NOTE — ANESTHESIA PREPROCEDURE EVALUATION
01/11/2023  Hoa Nava is a 27 y.o., female.      Pre-op Assessment    I have reviewed the Patient Summary Reports.     I have reviewed the Nursing Notes. I have reviewed the NPO Status.   I have reviewed the Medications.     Review of Systems  Hematology/Oncology:  Hematology Normal   Oncology Normal     EENT/Dental:EENT/Dental Normal   Cardiovascular:   Exercise tolerance: good    Renal/:  Renal/ Normal     Hepatic/GI:  Hepatic/GI Normal    Musculoskeletal:  Musculoskeletal Normal    Neurological:  Neurology Normal    Endocrine:   Diabetes, gestational    Dermatological:  Skin Normal    Psych:  Psychiatric Normal           Physical Exam  General: Well nourished    Airway:  Mallampati: II / II  Mouth Opening: Normal  TM Distance: Normal  Neck ROM: Normal ROM    Dental:  Intact        Anesthesia Plan  Type of Anesthesia, risks & benefits discussed:    Anesthesia Type: Spinal, CSE, Epidural  Intra-op Monitoring Plan: Standard ASA Monitors  Post Op Pain Control Plan: multimodal analgesia  Airway Plan: Direct  ASA Score: 2  Day of Surgery Review of History & Physical: H&P Update referred to the surgeon/provider.    Ready For Surgery From Anesthesia Perspective.     .

## 2023-01-11 NOTE — H&P
UPDATED HISTORY AND PHYSICAL        2023  Subjective:       Hoa Nava is a 27 y.o.  female with IUP at 38w5d weeks gestation who c/o contractions.   Contractions have been occuring Q5 minutes  min and have increased in intensity.  This IUP is complicated by Gestationla diabetes on Metformin   .  Patient reports contractions, reports vaginal bleeding, denies LOF.   Fetal Movement: normal.     PMHx:   Past Medical History:   Diagnosis Date    Chronic anemia 6/10/2020    Diabetes mellitus        PSHx:   Past Surgical History:   Procedure Laterality Date    DILATION AND CURETTAGE OF UTERUS  2019    [place]       All: Review of patient's allergies indicates:  No Known Allergies    Meds:   Medications Prior to Admission   Medication Sig Dispense Refill Last Dose    blood sugar diagnostic (TRUE METRIX GLUCOSE TEST STRIP) Strp 1 strip by Misc.(Non-Drug; Combo Route) route 4 (four) times daily. 100 strip 3     blood sugar diagnostic Strp 1 strip by Misc.(Non-Drug; Combo Route) route 4 (four) times daily. 100 each 3     blood-glucose meter Misc Use as instructed 1 each 0     erythromycin (ROMYCIN) ophthalmic ointment Place a 1/2 inch ribbon of ointment into the left lower eyelid 3x daily for 5 days. (Patient not taking: Reported on 2022) 1 Tube 0     ferrous sulfate (FEOSOL) 325 mg (65 mg iron) Tab tablet Take 1 tablet (325 mg total) by mouth 2 (two) times daily. 60 tablet 3     lancets 30 gauge Misc by Misc.(Non-Drug; Combo Route) route 4 (four) times daily. 100 each 2     lancets 33 gauge Misc 1 lancet by Misc.(Non-Drug; Combo Route) route 4 (four) times daily. 100 each 3     metFORMIN (GLUCOPHAGE) 500 MG tablet Take 1 tablet (500 mg total) by mouth 2 (two) times daily with meals. 90 tablet 1     norgestimate-ethinyl estradioL (ORTHO-CYCLEN) 0.25-35 mg-mcg per tablet Take 1 tablet by mouth once daily. 30 tablet 11     OPW TEST CLAIM - DO NOT FILL OPW test claim. Do not fill.  (Patient not taking: Reported on 2022) 100 tablet 0     prenatal 21-iron fu-folic acid (PRENATAL COMPLETE) 14 mg iron- 400 mcg Tab Take 1 tablet by mouth once daily. 30 tablet 0     prenatal vit,katie 74-iron-folic 27 mg iron- 1 mg Tab Take 1 tablet by mouth once daily. 90 tablet 3     promethazine (PHENERGAN) 25 MG suppository Place 1 suppository (25 mg total) rectally every 6 (six) hours as needed for Nausea. (Patient not taking: Reported on 2022) 12 suppository 1     promethazine (PHENERGAN) 25 MG tablet Take 1 tablet (25 mg total) by mouth every 6 (six) hours as needed for Nausea. (Patient not taking: Reported on 2022) 25 tablet 0     pyridoxine, vitamin B6, (B-6) 100 MG Tab Take 1 tablet (100 mg total) by mouth 3 (three) times daily before meals. (Patient not taking: Reported on 1/3/2023) 60 tablet 3        SH:   Social History     Socioeconomic History    Marital status: Single   Tobacco Use    Smoking status: Never    Smokeless tobacco: Never   Substance and Sexual Activity    Alcohol use: Not Currently    Drug use: Never    Sexual activity: Yes     Partners: Male     Birth control/protection: None       FH:   Family History   Problem Relation Age of Onset    Hypertension Father        OBHx:   OB History    Para Term  AB Living   3 1 1 0 1 1   SAB IAB Ectopic Multiple Live Births   1 0 0 0 1      # Outcome Date GA Lbr Alexander/2nd Weight Sex Delivery Anes PTL Lv   3 Current            2 Term 20 39w0d  3.515 kg (7 lb 12 oz) M Vag-Spont Spinal, EPI N EDI      Name: ROOPA COON      Apgar1: 9  Apgar5: 9   1 SAB 2019 3w0d              Review of Systems: Non contributory      Objective:       BP (!) 121/58 Comment: repeat b/p with patient's position changed  Pulse 67   LMP 04/15/2022   SpO2 98%     Vitals:    23 1149 23 1150 23 1151 23 1152   BP: (!) 84/57   (!) 121/58   Pulse: 67 66 76 67   SpO2:  98%         General:   alert, appears stated  age, and cooperative   Lungs:   clear to auscultation bilaterally   Heart:   regular rate and rhythm, S1, S2 normal, no murmur, click, rub or gallop   Abdomen:  soft, non-tender; bowel sounds normal; no masses,  no organomegaly   Extremities negative edema, negative erythema   FHT: 155 Cat 1 (reassuring)                 TOCO: Q 3 minutes   Presentations: cephalic by ultrasound   Cervix:     Dilation: 5    Effacement: 50%    Station:  -3    Consistency: medium    Position: middle     AROM clear fluid   IUPC placed    Lab Review  Blood Type B POS  GBBS: negative       Assessment:       38w5d weeks gestation.  Gestational diabetes on metformin   Labor     Patient Active Problem List   Diagnosis    Abdominal pain    Decreased fetal movement    Chronic anemia          Plan:      Risks, benefits, alternatives and possible complications have been discussed in detail with the patient.   - Consents signed and to chart  - Admit to Labor and Delivery unit    - Epidural per Anesthesia  - Draw CBC, T&S  - Recheck in 2 hrs or PRN            Roland Bryant M.D.   OB/GYN    1/11/2023

## 2023-01-11 NOTE — PROGRESS NOTES
DATE: 2023    S: 27 y.o.  at 38w5d   O:   BP: (!) 105/58    FHT: 145-155 x; Mod variability , Cat 1   [place]/IUPC: q 2-3 minutes   SVE:80 -2   Clear fluid       ASSESSMENT: 38w5d   Patient Active Problem List   Diagnosis    Abdominal pain    Decreased fetal movement     (normal spontaneous vaginal delivery)    Chronic anemia       PLAN:    Continue Close Maternal/Fetal Monitoring  Pitocin Augmentation per protocol  Recheck 2 hours or PRN    Roland Bryant M.D.   OB/GYN

## 2023-01-12 LAB
BASOPHILS # BLD AUTO: 0.03 K/UL (ref 0–0.2)
BASOPHILS NFR BLD: 0.3 % (ref 0–1.9)
DIFFERENTIAL METHOD: ABNORMAL
EOSINOPHIL # BLD AUTO: 0.1 K/UL (ref 0–0.5)
EOSINOPHIL NFR BLD: 0.5 % (ref 0–8)
ERYTHROCYTE [DISTWIDTH] IN BLOOD BY AUTOMATED COUNT: 14.3 % (ref 11.5–14.5)
HCT VFR BLD AUTO: 29.6 % (ref 37–48.5)
HGB BLD-MCNC: 9.5 G/DL (ref 12–16)
IMM GRANULOCYTES # BLD AUTO: 0.08 K/UL (ref 0–0.04)
IMM GRANULOCYTES NFR BLD AUTO: 0.7 % (ref 0–0.5)
LYMPHOCYTES # BLD AUTO: 2.1 K/UL (ref 1–4.8)
LYMPHOCYTES NFR BLD: 19.1 % (ref 18–48)
MCH RBC QN AUTO: 28 PG (ref 27–31)
MCHC RBC AUTO-ENTMCNC: 32.1 G/DL (ref 32–36)
MCV RBC AUTO: 87 FL (ref 82–98)
MONOCYTES # BLD AUTO: 0.8 K/UL (ref 0.3–1)
MONOCYTES NFR BLD: 7.5 % (ref 4–15)
NEUTROPHILS # BLD AUTO: 8 K/UL (ref 1.8–7.7)
NEUTROPHILS NFR BLD: 71.9 % (ref 38–73)
NRBC BLD-RTO: 0 /100 WBC
PLATELET # BLD AUTO: 164 K/UL (ref 150–450)
PMV BLD AUTO: 10.7 FL (ref 9.2–12.9)
RBC # BLD AUTO: 3.39 M/UL (ref 4–5.4)
WBC # BLD AUTO: 11.1 K/UL (ref 3.9–12.7)

## 2023-01-12 PROCEDURE — 25000003 PHARM REV CODE 250: Performed by: OBSTETRICS & GYNECOLOGY

## 2023-01-12 PROCEDURE — 11000001 HC ACUTE MED/SURG PRIVATE ROOM

## 2023-01-12 PROCEDURE — 36415 COLL VENOUS BLD VENIPUNCTURE: CPT | Performed by: OBSTETRICS & GYNECOLOGY

## 2023-01-12 PROCEDURE — 85025 COMPLETE CBC W/AUTO DIFF WBC: CPT | Performed by: OBSTETRICS & GYNECOLOGY

## 2023-01-12 PROCEDURE — 99232 SBSQ HOSP IP/OBS MODERATE 35: CPT | Mod: ,,, | Performed by: OBSTETRICS & GYNECOLOGY

## 2023-01-12 PROCEDURE — 99232 PR SUBSEQUENT HOSPITAL CARE,LEVL II: ICD-10-PCS | Mod: ,,, | Performed by: OBSTETRICS & GYNECOLOGY

## 2023-01-12 RX ORDER — NAPROXEN 500 MG/1
500 TABLET ORAL EVERY 8 HOURS PRN
Qty: 20 TABLET | Refills: 0 | Status: SHIPPED | OUTPATIENT
Start: 2023-01-12

## 2023-01-12 RX ADMIN — NAPROXEN 500 MG: 500 TABLET ORAL at 03:01

## 2023-01-12 RX ADMIN — PRENATAL VIT W/ FE FUMARATE-FA TAB 27-0.8 MG 1 TABLET: 27-0.8 TAB at 09:01

## 2023-01-12 RX ADMIN — OXYCODONE HYDROCHLORIDE AND ACETAMINOPHEN 1 TABLET: 5; 325 TABLET ORAL at 03:01

## 2023-01-12 RX ADMIN — NAPROXEN 500 MG: 500 TABLET ORAL at 04:01

## 2023-01-12 RX ADMIN — NAPROXEN 500 MG: 500 TABLET ORAL at 11:01

## 2023-01-12 RX ADMIN — OXYCODONE HYDROCHLORIDE AND ACETAMINOPHEN 1 TABLET: 5; 325 TABLET ORAL at 09:01

## 2023-01-12 NOTE — PROGRESS NOTES
Hoa Nava is a 27 y.o. female   PPD #1 status post  Spontaneous vaginal delivery. has no problems, feels well, no complaints  Patient reports none abd pain that is adequately Relieved by Oral pain medications. Lochia is mild to moderate  and decreasing, Voiding without difficulty Ambulating with no difficulty, has passed flatus, has not had BM,  Patient does plan to breast feed.    Objective:       BP (!) 96/49   Pulse 76   Temp 97.9 °F (36.6 °C)   Resp 18   LMP 04/15/2022   SpO2 98%   Vitals:    01/12/23 0338 01/12/23 0510 01/12/23 0823 01/12/23 1225   BP:  (!) 99/54 (!) 99/55 (!) 96/49   BP Location:  Left arm     Patient Position:  Lying     Pulse:  76 81 76   Resp: 18 17 18 18   Temp:  98 °F (36.7 °C) 97.5 °F (36.4 °C) 97.9 °F (36.6 °C)   TempSrc:  Oral     SpO2:  97% 96% 98%     General:   alert, appears stated age, and cooperative   Lungs:   clear to auscultation bilaterally   Heart:   regular rate and rhythm, S1, S2 normal, no murmur, click, rub or gallop   Abdomen:  soft, non-tender; bowel sounds normal; no masses,  no organomegaly   Uterus:  firm located at the umblicus.        Extremities: peripheral pulses normal, no pedal edema, no clubbing or cyanosis     Lab Review  Recent Results (from the past 72 hour(s))   CBC with Auto Differential    Collection Time: 01/11/23  9:26 AM   Result Value Ref Range    WBC 7.33 3.90 - 12.70 K/uL    RBC 4.04 4.00 - 5.40 M/uL    Hemoglobin 11.4 (L) 12.0 - 16.0 g/dL    Hematocrit 35.1 (L) 37.0 - 48.5 %    MCV 87 82 - 98 fL    MCH 28.2 27.0 - 31.0 pg    MCHC 32.5 32.0 - 36.0 g/dL    RDW 14.1 11.5 - 14.5 %    Platelets 204 150 - 450 K/uL    MPV 10.8 9.2 - 12.9 fL    Immature Granulocytes 1.0 (H) 0.0 - 0.5 %    Gran # (ANC) 4.9 1.8 - 7.7 K/uL    Immature Grans (Abs) 0.07 (H) 0.00 - 0.04 K/uL    Lymph # 1.9 1.0 - 4.8 K/uL    Mono # 0.4 0.3 - 1.0 K/uL    Eos # 0.0 0.0 - 0.5 K/uL    Baso # 0.01 0.00 - 0.20 K/uL    nRBC 0 0 /100 WBC    Gran % 66.3 38.0 -  73.0 %    Lymph % 26.3 18.0 - 48.0 %    Mono % 5.9 4.0 - 15.0 %    Eosinophil % 0.4 0.0 - 8.0 %    Basophil % 0.1 0.0 - 1.9 %    Differential Method Automated    Comprehensive metabolic panel    Collection Time: 01/11/23  9:27 AM   Result Value Ref Range    Sodium 134 (L) 136 - 145 mmol/L    Potassium 3.7 3.5 - 5.1 mmol/L    Chloride 105 95 - 110 mmol/L    CO2 19 (L) 23 - 29 mmol/L    Glucose 195 (H) 70 - 110 mg/dL    BUN 9 6 - 20 mg/dL    Creatinine 0.6 0.5 - 1.4 mg/dL    Calcium 8.5 (L) 8.7 - 10.5 mg/dL    Total Protein 5.9 (L) 6.0 - 8.4 g/dL    Albumin 2.6 (L) 3.5 - 5.2 g/dL    Total Bilirubin 0.3 0.1 - 1.0 mg/dL    Alkaline Phosphatase 125 55 - 135 U/L    AST 20 10 - 40 U/L    ALT 28 10 - 44 U/L    Anion Gap 10 8 - 16 mmol/L    eGFR >60 >60 mL/min/1.73 m^2   Type & Screen, Labor & Delivery    Collection Time: 01/11/23  9:27 AM   Result Value Ref Range    Group & Rh B POS     Indirect Tbaitha NEG    CBC auto differential    Collection Time: 01/12/23  5:18 AM   Result Value Ref Range    WBC 11.10 3.90 - 12.70 K/uL    RBC 3.39 (L) 4.00 - 5.40 M/uL    Hemoglobin 9.5 (L) 12.0 - 16.0 g/dL    Hematocrit 29.6 (L) 37.0 - 48.5 %    MCV 87 82 - 98 fL    MCH 28.0 27.0 - 31.0 pg    MCHC 32.1 32.0 - 36.0 g/dL    RDW 14.3 11.5 - 14.5 %    Platelets 164 150 - 450 K/uL    MPV 10.7 9.2 - 12.9 fL    Immature Granulocytes 0.7 (H) 0.0 - 0.5 %    Gran # (ANC) 8.0 (H) 1.8 - 7.7 K/uL    Immature Grans (Abs) 0.08 (H) 0.00 - 0.04 K/uL    Lymph # 2.1 1.0 - 4.8 K/uL    Mono # 0.8 0.3 - 1.0 K/uL    Eos # 0.1 0.0 - 0.5 K/uL    Baso # 0.03 0.00 - 0.20 K/uL    nRBC 0 0 /100 WBC    Gran % 71.9 38.0 - 73.0 %    Lymph % 19.1 18.0 - 48.0 %    Mono % 7.5 4.0 - 15.0 %    Eosinophil % 0.5 0.0 - 8.0 %    Basophil % 0.3 0.0 - 1.9 %    Differential Method Automated        I/O    Intake/Output Summary (Last 24 hours) at 1/12/2023 1307  Last data filed at 1/11/2023 2245  Gross per 24 hour   Intake --   Output 672 ml   Net -672 ml        Assessment:      Patient Active Problem List   Diagnosis    Abdominal pain    Decreased fetal movement     (normal spontaneous vaginal delivery)    Chronic anemia        Plan:   1. Patient doing well. Continue routine management and advances.  2. Continue PO pain meds. Pain well controlled.  3. H/h 9.5/26.5.   4. Encourage ambulation.     Anticipate discharge tomorrow   Rx to pharmacy       Roland rByant M.D.   OB/GYN    2023

## 2023-01-12 NOTE — DISCHARGE INSTRUCTIONS
"Instrucciones Para Aaron de [place]    Instrucciones a Seguir    Dieta regular  Actividad: Aumentarntar gradualmente  Morgan: Regadera o Tamela  Restricciones: No levantar nada pesado  Cuidado Personal: No tampones o duchas vaginales  Actividad Sexual: Mel relaciones sexuales  Planificacion Familiar: Consulta con ambrocio medico  Cuidado de los Senos: Use un sosten de soporte  Regresar al trabajo/escuela: Cuando ambrocio medico le indique    Cuando debe llamar al Doctor    *Fiebre de 100.4 o mas alto  *Nausea/Vomito persistente  *Secrecion de la incision  *Naima sangrado vaginal o coagulos  *Inflamacion/dolor en los brazos o las piernas  *Severo dolor de hugo, vision borrosa or desmayos  *Frequencia/ardor urinario  *Senales de depresion post-parto        La Depresion Post-Parto    Usted acaba de tener un lesvia'.  A pesar de que sabe que deberia sentirse emocionada y alarcon, lo que seinte son ganas de llorar sin motivo y tiene dificultades para realizar karina tareas diarias.  Usted pasa la mayor parte del tiempo diamante, cansada y desesperanzada, y hasta podria sentirse avergonzada o culpable.  Esperanza lo que le esta sucediendo no es culpa suya, y puede sentirse mejor.  Hable con ambrocio medico para que la ayude.     La Depresion Despues del Parto    Alber vez sienta cansancio y ganas de llorar roz despues de aaron a bernice.  Esta etapa melancolica, denominada "baby blues", puede hacerla sentir diamante y desesperanzada, o temerosa de que algo rosanna le vaya a pasar al lesvia.  Algunas mujeres hasta llegan a poner en glenna el que puedan ser buenas madres.    Que' es la Depresion?    La depresion es un trastorno del animo que afecta ambrocio manera de pernsar y de sentir.  El sintoma mas frecuente es un sentimiento de honda tristeza: tambien podria causane la sensacion de que ya no puede sobrellevar la rick.    Otros sintomas incluyen:      * Ganar o perder mucho peso  * Dormir en exceso o demasiado poco  * Estar cansada todo el tiempo  * Sentirse inquieta  * " Tener miedo de querer herir al lesvia'  * No tener interes por el lesvia'  * Sentirse inutil o culpable   * No encontrar placer en las cosas que solia gustarle hacer  * Dificultades para pensar con claridad o cristopher decisiones  * Pensar sobre la muerte o el suicidio     Que' Causa la Depresion Postparto?    La causea exacta de la depresion postarto se desconce, aunque posiblemente es el resultado de los cambios hormonales que suceden arlen y despues del parto.  Tambien puede deberse al cansancio que le causan las exigencias del lesvia' y el proceso de adaptacion a ambrocio maternidad.  Todos estos factores podrian deprimirla.  En algunos casos, existe sandra predisposicion genetica a desmond tipo de depresion.    La depresion puede tratarse    Lo sewell es que hay muchas maneras de tratar la depresion postparo.  Emprenda el primer paso para sentirse mejor hablando con ambrocio medico.     Recursos    * National Institutes of Mental Health-- 843-693-7639     www.nimh.nih.gov    * National [place] on Mental Illness -- 848-235-5779     Www.lis.org    * Mental Health Dionna --  [phone]     Www.[email or web address].org    * National Suidide Hotline -- [phone]    [phone] [employer]  Todos los derechos reservados.  Esta informacion no pretende sustituir las atencion medica profesional.  Solo ambrocio medico puede diagnosticar y tratar un problema de tim.                  Instrucciones de la lactancia maternal para los bebes recién nacidos:   La [employer] recomienda la leche maternal jennifer la unica Nancy de nutrición para ambrocio bebé arlen los primeros 6 meses de la rick, y puede continuar, con la introducción de comida, hasta y despues de 1 ano de edad. Informado sobre amaya consejos: Hay muchos beneficios de amamantar para el tim de la madre y del bebé. Mel los chupones, teteras, o botellas por lo menos por las primeras 4 semanas. Usar los chupones, teteras, o botellas pueden interumpir el proceso de amamantar.    Alimenta en cuanto hay muestras de hambre:  Jesica en la boca, hacienda movimientos de succionar.  Ruiditos suavecitos o estirarse  Llorar es un signo de hambre tardío: no esperes a que el bebé llore.  Es de esperarse que haya 8-12 alimentaciones por 24 horas, aunque estas alimentaciones no sigan un horario definido.  Alterna el seno con el que empiezas, o empieza con el seno que se siente más lleno.  Cambia de lado cuando el bebé empiece a tragar más despacio o se desconecte del seno.  Esta teodora si el bebé no come del john seno en cada alimentación.  Si el bebé esta muy dormido o somnoliente: el contacto de piel a piel puede animarlo a empezar a comer:  Quítale la camiseta y acuéstalo sobre tu pecho desnudo  Duerme cerca de tu bebé, aun en la casa. Aprende a aaron pecho acostada.  En la posición correcta los dos estarán cómodos:  barbilla con seno, pecho con pecho  Labio del bebé abierto hacia afuera para tragar: el labio del bebé debe estar volteado hacia afuera  Boca abierta teodora yaima: la boca del bebé cubre la mayoría de la areola (el área oscura del seno)--no nada más el pezón  Fíjate en los signos de que hay transferencia de leche:  Puedes oír al bebé tragar.  No se oyen ruidos más o menos sosa jennifer clic.  El bebé no demuestra que tiene más hambre después de la alimentación.  El cuerpo del bebé y karina jesica están relajados por un tiempo corto.  Lo que entra, tiene que salir. Está pendiente de:  Al cuarto día, por lo menos 3 cacas al día.  La haylee cambia de izaiah a louise o café y luego a amarillo más líquido cuando baja tu leche.  Después del cuarto día, por lo menos 6 pa?ales mojados/pesados al día.  La orina debe ser de color amarillo roberto cuando baja tu leche.  Debes cristopher agua cuando tienes sed y comer alimentos sanos cuando tiene hambre. Joy siesta para descansar lo suficiente.   No tomes medicamentos o alcohol sino con el consejo de ambrocio doctor. Puedes llamar al [facility]): ([phone]), [place], 8am-5pm, para obtener información sobre los medicamentos y la leche maternal.  La chano de seguimiento con la pediatra debe ser 2 días después de salir del hospital. El bebé deberia angi ganado el peso de nacimiento cuando tiene 10-14 días de rick.

## 2023-01-12 NOTE — ANESTHESIA POSTPROCEDURE EVALUATION
Anesthesia Post Evaluation    Patient: Hoa Nava    Procedure(s) Performed: * No procedures listed *    Final Anesthesia Type: epidural      Patient location during evaluation: labor & delivery  Patient participation: Yes- Able to Participate  Level of consciousness: awake and alert and awake  Post-procedure vital signs: reviewed and stable  Pain management: adequate  Airway patency: patent  PAWEL mitigation strategies: Preoperative initiation of continuous positive airway pressure (CPAP) or non-invasive positive pressure ventilation (NIPPV)  PONV status at discharge: No PONV  Anesthetic complications: no      Respiratory status: unassisted, spontaneous ventilation and room air  Hydration status: euvolemic  Follow-up not needed (denies pdpha).          Vitals Value Taken Time   /57 01/11/23 1705   Temp 37 °C (98.6 °F) 01/11/23 1525   Pulse 82 01/11/23 1827   Resp 18 01/11/23 1828   SpO2 98 % 01/11/23 1827   Vitals shown include unvalidated device data.      No case tracking events are documented in the log.      Pain/Judy Score: No data recorded

## 2023-01-12 NOTE — LACTATION NOTE
This note was copied from a baby's chart.  Information provided on benefits of exclusive breastfeeding, supply and demand, adequacy of colostrum, feeding frequency and normal  feeding patterns. Informed about risks of formula feeding, nipple confusion, and decreased milk supply. After education, mother still chooses to formula feed.        Instructed to feed on demand/cue, 8 or more times in 24 hours, utilizing paced bottle feeding technique. Feed baby until fullness cues observed. Questions/concenrs answered. Mother verbalizes understanding.       Recommended and offered the use of alternative feeding methods to provide supplementation.  Mother declines the use of alternative feeding methods.

## 2023-01-12 NOTE — NURSING
First hour of recover complete.  Fundus firm 1cm below the umbilicus without massage lochia rubra scant.  Pads changed PIT running at 95 ml /hr. Report given to Deanna ARCE night shift.

## 2023-01-13 VITALS
HEART RATE: 61 BPM | SYSTOLIC BLOOD PRESSURE: 104 MMHG | RESPIRATION RATE: 18 BRPM | OXYGEN SATURATION: 97 % | TEMPERATURE: 98 F | DIASTOLIC BLOOD PRESSURE: 55 MMHG

## 2023-01-13 DIAGNOSIS — Z30.2 ENCOUNTER FOR TUBAL LIGATION: Primary | ICD-10-CM

## 2023-01-13 PROCEDURE — 25000003 PHARM REV CODE 250: Performed by: OBSTETRICS & GYNECOLOGY

## 2023-01-13 PROCEDURE — 99238 HOSP IP/OBS DSCHRG MGMT 30/<: CPT | Mod: ,,, | Performed by: STUDENT IN AN ORGANIZED HEALTH CARE EDUCATION/TRAINING PROGRAM

## 2023-01-13 PROCEDURE — 99238 PR HOSPITAL DISCHARGE DAY,<30 MIN: ICD-10-PCS | Mod: ,,, | Performed by: STUDENT IN AN ORGANIZED HEALTH CARE EDUCATION/TRAINING PROGRAM

## 2023-01-13 RX ADMIN — PRENATAL VIT W/ FE FUMARATE-FA TAB 27-0.8 MG 1 TABLET: 27-0.8 TAB at 08:01

## 2023-01-13 NOTE — DISCHARGE SUMMARY
Obstetrical Discharge Summary    Admission Date: 2023   Discharge Date: 2023  Admitting Diagnosis: Term pregnancy [Z34.90] Intrauterine pregnancy 39w0d  Discharge Diagnosis: Vaginal, Spontaneous     Procedure:   Consults: none     Delivery date/time: 2023  @ 5:37 pm   Delivery type: Vaginal, Spontaneous   Delivery Anesthesia: Epidural   Episiotomy:Episiotomy: None   Laceration type: None    Infant sex: male  Infant birthweight: 3.661 kg (8 lb 1.1 oz)     Apgars:  1 minute: 7    5 minute: 8    10 minute:    15 minute:                       Hospital Course: Patient was admitted to L&D for labor. She had a uncomplicated vaginal delivery of a viable male infant weighing 3.661 kg (8 lb 1.1 oz) .  She was transferred to mother baby in stable condition.  Her recovery was uncomplicated and by post-partum day 2 she was tolerating PO without N/V, ambulating without difficulty, her pain was well controlled with PO pain medications and she had passed flatus. She was both breast and bottlefeeding. She desired BTL for birth control. She was stable and ready for discharge.       Pertinent studies:  Postpartum CBC  Lab Results   Component Value Date    WBC 11.10 2023    HGB 9.5 (L) 2023    HCT 29.6 (L) 2023    MCV 87 2023     2023       Vital signs at discharge:  BP (!) 104/55   Pulse 61   Temp 97.9 °F (36.6 °C)   Resp 18   LMP 04/15/2022   SpO2 97%   Breastfeeding Yes     Rh Immune Globulin Given(B POS): N/A  Rubella Vaccine Given: N/A  Tdap Vaccine Given: given  Contraception:  desires BTL      Patient Instructions:   Current Discharge Medication List        START taking these medications    Details   naproxen (NAPROSYN) 500 MG tablet Take 1 tablet (500 mg total) by mouth every 8 (eight) hours as needed (Cramping).  Qty: 20 tablet, Refills: 0           CONTINUE these medications which have NOT CHANGED    Details   ferrous sulfate (FEOSOL) 325 mg (65 mg iron) Tab tablet  Take 1 tablet (325 mg total) by mouth 2 (two) times daily.  Qty: 60 tablet, Refills: 3      prenatal 21-iron fu-folic acid (PRENATAL COMPLETE) 14 mg iron- 400 mcg Tab Take 1 tablet by mouth once daily.  Qty: 30 tablet, Refills: 0      prenatal vit,katie 74-iron-folic 27 mg iron- 1 mg Tab Take 1 tablet by mouth once daily.  Qty: 90 tablet, Refills: 3           STOP taking these medications       blood sugar diagnostic (TRUE METRIX GLUCOSE TEST STRIP) Strp Comments:   Reason for Stopping:         blood sugar diagnostic Strp Comments:   Reason for Stopping:         blood-glucose meter Misc Comments:   Reason for Stopping:         erythromycin (ROMYCIN) ophthalmic ointment Comments:   Reason for Stopping:         lancets 30 gauge Misc Comments:   Reason for Stopping:         lancets 33 gauge Misc Comments:   Reason for Stopping:         metFORMIN (GLUCOPHAGE) 500 MG tablet Comments:   Reason for Stopping:         norgestimate-ethinyl estradioL (ORTHO-CYCLEN) 0.25-35 mg-mcg per tablet Comments:   Reason for Stopping:         OPW TEST CLAIM - DO NOT FILL Comments:   Reason for Stopping:         promethazine (PHENERGAN) 25 MG suppository Comments:   Reason for Stopping:         promethazine (PHENERGAN) 25 MG tablet Comments:   Reason for Stopping:         pyridoxine, vitamin B6, (B-6) 100 MG Tab Comments:   Reason for Stopping:               Discharge Procedure Orders   BREAST PUMP FOR HOME USE     Order Specific Question Answer Comments   Type of pump: Electric    Weight: 83.3    Length of need (1-99 months): 99      Diet Adult Regular     Pelvic Rest     Notify your health care provider if you experience any of the following:  temperature >100.4     Notify your health care provider if you experience any of the following:  persistent nausea and vomiting or diarrhea     Notify your health care provider if you experience any of the following:  severe uncontrolled pain     Notify your health care provider if you experience any  of the following:  persistent dizziness, light-headedness, or visual disturbances     Notify your health care provider if you experience any of the following:  severe persistent headache     Activity as tolerated           Disposition: home   Condition: stable for discharge  Follow up: 6 week post partum visit      Preprinted discharge instructions given to patient.            Rosa Maria Rivas MD

## 2023-01-13 NOTE — PROGRESS NOTES
Postpartum Progress Note - Vaginal Delivery    Postpartum day 2     Dilia #[id number]    S: Patient feels well with no complaints. Pain is well-controlled with medications. Lochia decreasing . Urinating without difficulties. Tolerating a regular diet. Breastfeeding without difficulties.    Negative ROS: no headache, blurry vision, chest pain, SOB        O: Patient Vitals for the past 12 hrs:   BP Temp Temp src Pulse Resp SpO2   23 0718 (!) 104/55 97.9 °F (36.6 °C) -- 61 18 97 %   23 0344 (!) 104/59 -- -- 76 -- --   23 0338 (!) 95/54 97.9 °F (36.6 °C) Oral 68 17 98 %   23 2305 124/67 97.9 °F (36.6 °C) Oral 102 18 99 %   23 2151 -- -- -- -- 18 --       General - NAD   Lungs - good respiratory effort.   Abd - +BS. Soft, non-distended, appropriately tender to palpation. Uterus firm and below U.   Ext - no LE edema or calf tenderness bilaterally.       Lab Results   Component Value Date    HCT 29.6 (L) 2023         A/P:  27 y.o.  PPD2 s/p  at 39w0d   Patient stable. VSS.    1. Continue Routine Care   - Hgb 11.4 --> 9.5   - MBT: B pos, Rhogam not indicated   - Rubella immune   - Breastfeeding and formula feeding. Appreciate assistance by lactation.   - Contraception: discussed BTL with Dr. Bryant    - Discharge planned for today        Rosa Maria Rivas MD

## 2023-01-13 NOTE — PLAN OF CARE
POC reviewed with pt in [place]; pt verbalized acceptance and understanding.  Pt reports pain goal met with prescribed medications per MD.  Ambulating freely in room. Tolerating regular diet. Lochia rubra & scant. Positive bonding with baby AEB holding, feeding, dressing, and changing baby's diaper. Smiles appropriately at baby. VSS. NAD noted. WCTM.

## 2023-01-13 NOTE — LACTATION NOTE
[facility]  Lactation Note - Mom    SUMMARY     Maternal Assessment    Breast Density: Bilateral:, soft      LATCH Score         Breasts WDL    Breast WDL: WDL (per mom;did not assess)    Maternal Infant Feeding    Maternal Preparation: breast care, hand hygiene  Maternal Emotional State: relaxed  Pain with Feeding: no  Comfort Measures Following Feeding: air-drying encouraged  Latch Assistance: no    Lactation Referrals    Community Referrals: outpatient lactation program, pediatric care provider, support group, WIC (women, infants and children) program  Outpatient Lactation Program Lactation Follow-up Date/Time: enc to call warmline w/otoniel prescottn  Pediatric Care Provider Lactation Follow-up Date/Time: within 2-3 days;plans to sched appt w/Dr Rose  Support Group Lactation Follow-up Date/Time: rev'd resources in [facility] Lactation Follow-up Date/Time: rev'd resources in [facility]    Lactation Interventions    Breast Care: Breastfeeding: breast milk to nipples  Breastfeeding Assistance: feeding cue recognition promoted, feeding on demand promoted, hand expression verified, support offered  Breast Care: Breastfeeding: breast milk to nipples  Breastfeeding Assistance: feeding cue recognition promoted, feeding on demand promoted, hand expression verified, support offered  Breastfeeding Support: diary/feeding log utilized, encouragement provided, lactation counseling provided, maternal hydration promoted, maternal nutrition promoted, maternal rest encouraged       Breastfeeding Session         Maternal Information

## 2023-01-26 ENCOUNTER — TELEPHONE (OUTPATIENT)
Dept: OBSTETRICS AND GYNECOLOGY | Facility: CLINIC | Age: 28
End: 2023-01-26
Payer: MEDICAID

## 2023-01-26 NOTE — TELEPHONE ENCOUNTER
----- Message from Roland Bryant MD sent at 1/11/2023  1:31 PM CST -----  Jai Mayberry.     This lady needs to be scheduled x surgery               Diagnosis-    Undesired fertility                                                 Procedure -        Laparoscopic tubal ligation/ salpingectomy                                             Assistant=  Clair   Equipment =       Thanks     Roland Bryant M.D.   OB/GYN

## 2023-02-13 ENCOUNTER — TELEPHONE (OUTPATIENT)
Dept: OBSTETRICS AND GYNECOLOGY | Facility: CLINIC | Age: 28
End: 2023-02-13

## 2023-02-13 ENCOUNTER — POSTPARTUM VISIT (OUTPATIENT)
Dept: OBSTETRICS AND GYNECOLOGY | Facility: CLINIC | Age: 28
End: 2023-02-13
Payer: MEDICAID

## 2023-02-13 VITALS
WEIGHT: 162.69 LBS | BODY MASS INDEX: 27.07 KG/M2 | DIASTOLIC BLOOD PRESSURE: 65 MMHG | SYSTOLIC BLOOD PRESSURE: 122 MMHG

## 2023-02-13 DIAGNOSIS — Z30.46 ENCOUNTER FOR SURVEILLANCE OF IMPLANTABLE SUBDERMAL CONTRACEPTIVE: Primary | ICD-10-CM

## 2023-02-13 DIAGNOSIS — Z30.2 ENCOUNTER FOR TUBAL LIGATION: ICD-10-CM

## 2023-02-13 PROCEDURE — 59430 PR CARE AFTER DELIVERY ONLY: ICD-10-PCS | Mod: S$PBB,,, | Performed by: OBSTETRICS & GYNECOLOGY

## 2023-02-13 PROCEDURE — 99999 PR PBB SHADOW E&M-EST. PATIENT-LVL III: ICD-10-PCS | Mod: PBBFAC,,, | Performed by: OBSTETRICS & GYNECOLOGY

## 2023-02-13 PROCEDURE — 99999 PR PBB SHADOW E&M-EST. PATIENT-LVL III: CPT | Mod: PBBFAC,,, | Performed by: OBSTETRICS & GYNECOLOGY

## 2023-02-13 PROCEDURE — 99213 OFFICE O/P EST LOW 20 MIN: CPT | Mod: PBBFAC,PO | Performed by: OBSTETRICS & GYNECOLOGY

## 2023-02-13 NOTE — TELEPHONE ENCOUNTER
Spoke to pt and she informs us that she no longer wants to have tubal and would like to order her nexplanon. In the meantime pt would like to come tomorrow for a depo while she waits to have nexplanon inserted. Please place depo order so that I can help schedule her for 2/14/2023

## 2023-02-13 NOTE — TELEPHONE ENCOUNTER
----- Message from Sana Sánchez sent at 2/13/2023  1:51 PM CST -----  Type:  Procedure     Who Called:pt  Who Left Message for Patient:office  Does the patient know what this is regarding?:would a different procedure   Would the patient rather a call back or a response via [employer]? call  Best Call Back Number:[phone]  Additional Information:

## 2023-02-14 NOTE — PROGRESS NOTES
CC: Post-partum follow-up    Hoa Nava is a 28 y.o. female  who presents for post-partum visit.  She is S/P a .  She and the baby are doing well.  No pain.  No fever.   No bowel / bladder complaints.    Delivery Date: 2023    Breast Feeding: YES  Depression: NO  Contraception: scheduled for BTL next Monday     Pregnancy was complicated by:  Gestational diabetes  on Diet and metformin     /65   Wt 73.8 kg (162 lb 11.2 oz)   LMP 04/15/2022   Breastfeeding Yes   BMI 27.07 kg/m²     ROS:  GENERAL: No fever, chills, fatigability.  VULVAR: No pain, no lesions and no itching.  VAGINAL: No relaxation, no itching, no discharge, no abnormal bleeding and no lesions.  ABDOMEN: No abdominal pain. Denies nausea. Denies vomiting. No diarrhea. No constipation  BREAST: Denies pain. No lumps. No discharge.  URINARY: No incontinence, no nocturia, no frequency and no dysuria.  CARDIOVASCULAR: No chest pain. No shortness of breath. No leg cramps.  NEUROLOGICAL: No headaches. No vision changes.    PHYSICAL EXAM:  ABDOMEN:  Soft, non-tender, non-distended  VULVA:  Normal, no lesions  CERVIX:  Without lesions, polyps or tenderness.  UTERUS:  Normal size, shape, consistency, no mass or tenderness.  ADNEXA:  Normal in size without mass or tenderness    IMP:  Doing well S/P   Instructions / precautions reviewed  Contraceptive counseling      PLAN:  May resume normal activities  Return: for scheduled laparoscopic BTL on Monday     Roland Bryant M.D.   OB/GYN

## 2023-02-17 ENCOUNTER — TELEPHONE (OUTPATIENT)
Dept: OBSTETRICS AND GYNECOLOGY | Facility: CLINIC | Age: 28
End: 2023-02-17
Payer: MEDICAID

## 2023-02-17 NOTE — TELEPHONE ENCOUNTER
Returned pt call and informed her that we can place nexplanon in on 3/1/2023 at 1 pm While pt is waiting she would like to come in on monday for a depo. Please place order for scheduling. Evelin please help schedule nexplanon insert off referral for 3/1/23 at 1p.m ty.   DISCHARGE

## 2023-02-17 NOTE — TELEPHONE ENCOUNTER
----- Message from Radha Cortez sent at 2/17/2023 10:38 AM CST -----  Type:  Needs Medical Advice    Who Called: pt  Symptoms (please be specific): pt is requesting to get a return call to discuss scheduling    :    Would the patient rather a call back or a response via [employer]? call  Best Call Back Number: [phone]  Additional Information:

## 2023-02-20 ENCOUNTER — TELEPHONE (OUTPATIENT)
Dept: OBSTETRICS AND GYNECOLOGY | Facility: CLINIC | Age: 28
End: 2023-02-20
Payer: MEDICAID

## 2023-03-01 ENCOUNTER — PROCEDURE VISIT (OUTPATIENT)
Dept: OBSTETRICS AND GYNECOLOGY | Facility: CLINIC | Age: 28
End: 2023-03-01
Payer: MEDICAID

## 2023-03-01 VITALS — SYSTOLIC BLOOD PRESSURE: 90 MMHG | WEIGHT: 165.56 LBS | BODY MASS INDEX: 27.55 KG/M2 | DIASTOLIC BLOOD PRESSURE: 54 MMHG

## 2023-03-01 DIAGNOSIS — Z30.017 NEXPLANON INSERTION: Primary | ICD-10-CM

## 2023-03-01 DIAGNOSIS — Z30.09 GENERAL COUNSELING AND ADVICE FOR CONTRACEPTIVE MANAGEMENT: ICD-10-CM

## 2023-03-01 PROCEDURE — 11981 INSERTION DRUG DLVR IMPLANT: CPT | Mod: PBBFAC,PO | Performed by: OBSTETRICS & GYNECOLOGY

## 2023-03-01 PROCEDURE — 11981 INSERTION OF NEXPLANON: ICD-10-PCS | Mod: S$PBB,,, | Performed by: OBSTETRICS & GYNECOLOGY

## 2023-03-01 RX ADMIN — ETONOGESTREL 68 MG: 68 IMPLANT SUBCUTANEOUS at 01:03

## 2023-03-02 NOTE — PROCEDURES
Insertion of Nexplanon    Date/Time: 3/1/2023 1:00 PM  Performed by: Roland Bryant MD  Authorized by: Roland Bryant MD     Consent obtained:  Verbal  Consent given by:  Patient  Patient questions answered: yes    Patient agrees, verbalizes understanding, and wants to proceed: yes    Educational handouts given: no    Instructions and paperwork completed: yes    Pre-procedure timeout performed: yes    Prepped with: povidone-iodine    Local anesthetic:  Lidocaine 1%  The site was cleaned and prepped in a sterile fashion: no    Small stab incision was made in arm: yes    Left/right:  Left   68 mg etonogestreL 68 mg  Visualization of implant was obtained: yes    Nexplanon was inserted and trocar removed: yes    Palpitation confirms placement by provider and patient: yes    Site was closed with steri-strips and pressure bandage applied: yes      Roland Bryant M.D.   OB/GYN    3/1/2023

## 2024-08-22 NOTE — TELEPHONE ENCOUNTER
----- Message from Danna Carter sent at 1/9/2020  9:00 AM CST -----  Contact: Mady Earl from [facility]    Mady says she need to get pt due date    Mady can be reached at [phone]     Yes